# Patient Record
Sex: FEMALE | Race: WHITE | NOT HISPANIC OR LATINO | Employment: FULL TIME | RURAL
[De-identification: names, ages, dates, MRNs, and addresses within clinical notes are randomized per-mention and may not be internally consistent; named-entity substitution may affect disease eponyms.]

---

## 2020-06-15 ENCOUNTER — HISTORICAL (OUTPATIENT)
Dept: ADMINISTRATIVE | Facility: HOSPITAL | Age: 45
End: 2020-06-15

## 2020-06-15 LAB
BASOPHILS # BLD AUTO: 0.02 X10E3/UL (ref 0–0.2)
BASOPHILS NFR BLD AUTO: 0.4 % (ref 0–1)
BUN SERPL-MCNC: 12 MG/DL (ref 7–18)
CALCIUM SERPL-MCNC: 9.1 MG/DL (ref 8.5–10.1)
CHLORIDE SERPL-SCNC: 106 MMOL/L (ref 98–107)
CO2 SERPL-SCNC: 26 MMOL/L (ref 21–32)
CREAT SERPL-MCNC: 1.11 MG/DL (ref 0.55–1.02)
EOSINOPHIL # BLD AUTO: 0.12 X10E3/UL (ref 0–0.5)
EOSINOPHIL NFR BLD AUTO: 2.2 % (ref 1–4)
ERYTHROCYTE [DISTWIDTH] IN BLOOD BY AUTOMATED COUNT: 15.4 % (ref 11.5–14.5)
GLUCOSE SERPL-MCNC: 105 MG/DL (ref 74–106)
HCG SERUM QUALITATIVE: NEGATIVE
HCT VFR BLD AUTO: 37.9 % (ref 38–47)
HGB BLD-MCNC: 11.4 G/DL (ref 12–16)
IMM GRANULOCYTES # BLD AUTO: 0.02 X10E3/UL (ref 0–0.04)
IMM GRANULOCYTES NFR BLD: 0.4 % (ref 0–0.4)
LYMPHOCYTES # BLD AUTO: 1.51 X10E3/UL (ref 1–4.8)
LYMPHOCYTES NFR BLD AUTO: 27.1 % (ref 27–41)
MCH RBC QN AUTO: 25.2 PG (ref 27–31)
MCHC RBC AUTO-ENTMCNC: 30.1 G/DL (ref 32–36)
MCV RBC AUTO: 83.7 FL (ref 80–96)
MONOCYTES # BLD AUTO: 0.44 X10E3/UL (ref 0–0.8)
MONOCYTES NFR BLD AUTO: 7.9 % (ref 2–6)
MPC BLD CALC-MCNC: 9.8 FL (ref 9.4–12.4)
NEUTROPHILS # BLD AUTO: 3.47 X10E3/UL (ref 1.8–7.7)
NEUTROPHILS NFR BLD AUTO: 62 % (ref 53–65)
NRBC # BLD AUTO: 0 X10E3/UL (ref 0–0)
NRBC, AUTO (.00): 0 /100 (ref 0–0)
PLATELET # BLD AUTO: 314 X10E3/UL (ref 150–400)
POTASSIUM SERPL-SCNC: 3.6 MMOL/L (ref 3.5–5.1)
RBC # BLD AUTO: 4.53 X10E6/UL (ref 4.2–5.4)
SODIUM SERPL-SCNC: 139 MMOL/L (ref 136–145)
WBC # BLD AUTO: 5.58 X10E3/UL (ref 4.5–11)

## 2021-05-26 ENCOUNTER — HOSPITAL ENCOUNTER (EMERGENCY)
Facility: HOSPITAL | Age: 46
Discharge: HOME | End: 2021-05-26
Attending: STUDENT IN AN ORGANIZED HEALTH CARE EDUCATION/TRAINING PROGRAM
Payer: MEDICAID

## 2021-05-26 VITALS
RESPIRATION RATE: 16 BRPM | DIASTOLIC BLOOD PRESSURE: 100 MMHG | TEMPERATURE: 98 F | HEIGHT: 66 IN | OXYGEN SATURATION: 97 % | SYSTOLIC BLOOD PRESSURE: 149 MMHG | BODY MASS INDEX: 25.71 KG/M2 | HEART RATE: 80 BPM | WEIGHT: 160 LBS

## 2021-05-26 DIAGNOSIS — K52.9 GASTROENTERITIS: ICD-10-CM

## 2021-05-26 DIAGNOSIS — U07.1 COVID-19: Primary | ICD-10-CM

## 2021-05-26 LAB
ANION GAP SERPL CALCULATED.3IONS-SCNC: 8 MMOL/L (ref 7–16)
ANISOCYTOSIS BLD QL SMEAR: ABNORMAL
BASOPHILS # BLD AUTO: 0.01 K/UL (ref 0–0.2)
BASOPHILS NFR BLD AUTO: 0.4 % (ref 0–1)
BUN SERPL-MCNC: 9 MG/DL (ref 7–18)
BUN/CREAT SERPL: 13 (ref 6–20)
CALCIUM SERPL-MCNC: 8.6 MG/DL (ref 8.5–10.1)
CHLORIDE SERPL-SCNC: 108 MMOL/L (ref 98–107)
CO2 SERPL-SCNC: 27 MMOL/L (ref 21–32)
CREAT SERPL-MCNC: 0.7 MG/DL (ref 0.55–1.02)
DIFFERENTIAL METHOD BLD: ABNORMAL
EOSINOPHIL # BLD AUTO: 0 K/UL (ref 0–0.5)
EOSINOPHIL NFR BLD AUTO: 0 % (ref 1–4)
ERYTHROCYTE [DISTWIDTH] IN BLOOD BY AUTOMATED COUNT: 15 % (ref 11.5–14.5)
FLUAV AG UPPER RESP QL IA.RAPID: NEGATIVE
FLUBV AG UPPER RESP QL IA.RAPID: NEGATIVE
GLUCOSE SERPL-MCNC: 102 MG/DL (ref 74–106)
HCT VFR BLD AUTO: 37.3 % (ref 38–47)
HGB BLD-MCNC: 11.4 G/DL (ref 12–16)
IMM GRANULOCYTES # BLD AUTO: 0 K/UL (ref 0–0.04)
IMM GRANULOCYTES NFR BLD: 0 % (ref 0–0.4)
LYMPHOCYTES # BLD AUTO: 0.71 K/UL (ref 1–4.8)
LYMPHOCYTES NFR BLD AUTO: 30.3 % (ref 27–41)
LYMPHOCYTES NFR BLD MANUAL: 27 % (ref 27–41)
MCH RBC QN AUTO: 24.4 PG (ref 27–31)
MCHC RBC AUTO-ENTMCNC: 30.6 G/DL (ref 32–36)
MCV RBC AUTO: 79.7 FL (ref 80–96)
MONOCYTES # BLD AUTO: 0.16 K/UL (ref 0–0.8)
MONOCYTES NFR BLD AUTO: 6.8 % (ref 2–6)
MONOCYTES NFR BLD MANUAL: 3 % (ref 2–6)
MPC BLD CALC-MCNC: 10.3 FL (ref 9.4–12.4)
NEUTROPHILS # BLD AUTO: 1.46 K/UL (ref 1.8–7.7)
NEUTROPHILS NFR BLD AUTO: 62.5 % (ref 53–65)
NEUTS BAND NFR BLD MANUAL: 1 % (ref 1–5)
NEUTS SEG NFR BLD MANUAL: 69 % (ref 50–62)
NRBC # BLD AUTO: 0 X10E3/UL
NRBC, AUTO (.00): 0 %
OVALOCYTES BLD QL SMEAR: ABNORMAL
PLATELET # BLD AUTO: 201 K/UL (ref 150–400)
PLATELET MORPHOLOGY: ABNORMAL
POTASSIUM SERPL-SCNC: 3.4 MMOL/L (ref 3.5–5.1)
RBC # BLD AUTO: 4.68 M/UL (ref 4.2–5.4)
SARS-COV+SARS-COV-2 AG RESP QL IA.RAPID: POSITIVE
SODIUM SERPL-SCNC: 140 MMOL/L (ref 136–145)
WBC # BLD AUTO: 2.34 K/UL (ref 4.5–11)

## 2021-05-26 PROCEDURE — 25000003 PHARM REV CODE 250: Performed by: STUDENT IN AN ORGANIZED HEALTH CARE EDUCATION/TRAINING PROGRAM

## 2021-05-26 PROCEDURE — 80048 BASIC METABOLIC PNL TOTAL CA: CPT | Performed by: STUDENT IN AN ORGANIZED HEALTH CARE EDUCATION/TRAINING PROGRAM

## 2021-05-26 PROCEDURE — 99284 PR EMERGENCY DEPT VISIT,LEVEL IV: ICD-10-PCS | Mod: ,,, | Performed by: STUDENT IN AN ORGANIZED HEALTH CARE EDUCATION/TRAINING PROGRAM

## 2021-05-26 PROCEDURE — 87428 SARSCOV & INF VIR A&B AG IA: CPT | Performed by: STUDENT IN AN ORGANIZED HEALTH CARE EDUCATION/TRAINING PROGRAM

## 2021-05-26 PROCEDURE — 99284 EMERGENCY DEPT VISIT MOD MDM: CPT | Mod: ,,, | Performed by: STUDENT IN AN ORGANIZED HEALTH CARE EDUCATION/TRAINING PROGRAM

## 2021-05-26 PROCEDURE — 99284 EMERGENCY DEPT VISIT MOD MDM: CPT | Mod: 25

## 2021-05-26 PROCEDURE — 96365 THER/PROPH/DIAG IV INF INIT: CPT

## 2021-05-26 PROCEDURE — 36415 COLL VENOUS BLD VENIPUNCTURE: CPT | Performed by: STUDENT IN AN ORGANIZED HEALTH CARE EDUCATION/TRAINING PROGRAM

## 2021-05-26 PROCEDURE — 63600175 PHARM REV CODE 636 W HCPCS: Performed by: STUDENT IN AN ORGANIZED HEALTH CARE EDUCATION/TRAINING PROGRAM

## 2021-05-26 PROCEDURE — 85025 COMPLETE CBC W/AUTO DIFF WBC: CPT | Performed by: STUDENT IN AN ORGANIZED HEALTH CARE EDUCATION/TRAINING PROGRAM

## 2021-05-26 RX ORDER — METHYLPREDNISOLONE 4 MG/1
TABLET ORAL
Qty: 1 PACKAGE | Refills: 0 | Status: SHIPPED | OUTPATIENT
Start: 2021-05-26 | End: 2021-06-16

## 2021-05-26 RX ORDER — POTASSIUM CHLORIDE 20 MEQ/1
40 TABLET, EXTENDED RELEASE ORAL
Status: COMPLETED | OUTPATIENT
Start: 2021-05-26 | End: 2021-05-26

## 2021-05-26 RX ORDER — ONDANSETRON 4 MG/1
4 TABLET, ORALLY DISINTEGRATING ORAL
Status: COMPLETED | OUTPATIENT
Start: 2021-05-26 | End: 2021-05-26

## 2021-05-26 RX ORDER — LISINOPRIL 40 MG/1
40 TABLET ORAL DAILY
COMMUNITY
End: 2021-12-02

## 2021-05-26 RX ADMIN — SODIUM CHLORIDE 1000 ML: 9 INJECTION, SOLUTION INTRAVENOUS at 01:05

## 2021-05-26 RX ADMIN — POTASSIUM CHLORIDE 40 MEQ: 1500 TABLET, EXTENDED RELEASE ORAL at 03:05

## 2021-05-26 RX ADMIN — ONDANSETRON 4 MG: 4 TABLET, ORALLY DISINTEGRATING ORAL at 12:05

## 2021-05-26 RX ADMIN — PROMETHAZINE HYDROCHLORIDE 25 MG: 25 INJECTION INTRAMUSCULAR; INTRAVENOUS at 01:05

## 2021-09-21 ENCOUNTER — OFFICE VISIT (OUTPATIENT)
Dept: OBSTETRICS AND GYNECOLOGY | Facility: CLINIC | Age: 46
End: 2021-09-21
Payer: MEDICAID

## 2021-09-21 VITALS
HEIGHT: 66 IN | SYSTOLIC BLOOD PRESSURE: 152 MMHG | BODY MASS INDEX: 27.32 KG/M2 | TEMPERATURE: 99 F | HEART RATE: 68 BPM | WEIGHT: 170 LBS | RESPIRATION RATE: 18 BRPM | DIASTOLIC BLOOD PRESSURE: 98 MMHG | OXYGEN SATURATION: 99 %

## 2021-09-21 DIAGNOSIS — N95.1 PERIMENOPAUSAL: ICD-10-CM

## 2021-09-21 DIAGNOSIS — G47.9 SLEEP DISTURBANCE: ICD-10-CM

## 2021-09-21 DIAGNOSIS — N92.1 MENORRHAGIA WITH IRREGULAR CYCLE: Primary | ICD-10-CM

## 2021-09-21 DIAGNOSIS — L65.9 HAIR THINNING: ICD-10-CM

## 2021-09-21 DIAGNOSIS — F41.9 ANXIETY: ICD-10-CM

## 2021-09-21 LAB
BASOPHILS # BLD AUTO: 0.02 K/UL (ref 0–0.2)
BASOPHILS NFR BLD AUTO: 0.3 % (ref 0–1)
DIFFERENTIAL METHOD BLD: ABNORMAL
EOSINOPHIL # BLD AUTO: 0.09 K/UL (ref 0–0.5)
EOSINOPHIL NFR BLD AUTO: 1.2 % (ref 1–4)
ERYTHROCYTE [DISTWIDTH] IN BLOOD BY AUTOMATED COUNT: 14.9 % (ref 11.5–14.5)
FSH SERPL-ACNC: 2 MIU/ML (ref 1.7–134.8)
HCT VFR BLD AUTO: 34.5 % (ref 38–47)
HGB BLD-MCNC: 10.4 G/DL (ref 12–16)
IMM GRANULOCYTES # BLD AUTO: 0.02 K/UL (ref 0–0.04)
IMM GRANULOCYTES NFR BLD: 0.3 % (ref 0–0.4)
LYMPHOCYTES # BLD AUTO: 1.85 K/UL (ref 1–4.8)
LYMPHOCYTES NFR BLD AUTO: 23.7 % (ref 27–41)
MCH RBC QN AUTO: 24.5 PG (ref 27–31)
MCHC RBC AUTO-ENTMCNC: 30.1 G/DL (ref 32–36)
MCV RBC AUTO: 81.2 FL (ref 80–96)
MONOCYTES # BLD AUTO: 0.44 K/UL (ref 0–0.8)
MONOCYTES NFR BLD AUTO: 5.6 % (ref 2–6)
MPC BLD CALC-MCNC: 10.9 FL (ref 9.4–12.4)
NEUTROPHILS # BLD AUTO: 5.38 K/UL (ref 1.8–7.7)
NEUTROPHILS NFR BLD AUTO: 68.9 % (ref 53–65)
NRBC # BLD AUTO: 0 X10E3/UL
NRBC, AUTO (.00): 0 %
PLATELET # BLD AUTO: 312 K/UL (ref 150–400)
RBC # BLD AUTO: 4.25 M/UL (ref 4.2–5.4)
T4 FREE SERPL-MCNC: 0.91 NG/DL (ref 0.76–1.46)
TSH SERPL DL<=0.005 MIU/L-ACNC: 1.85 UIU/ML (ref 0.36–3.74)
WBC # BLD AUTO: 7.8 K/UL (ref 4.5–11)

## 2021-09-21 PROCEDURE — 83001 FOLLICLE STIMULATING HORMONE: ICD-10-PCS | Mod: ,,, | Performed by: CLINICAL MEDICAL LABORATORY

## 2021-09-21 PROCEDURE — 84443 TSH: ICD-10-PCS | Mod: ,,, | Performed by: CLINICAL MEDICAL LABORATORY

## 2021-09-21 PROCEDURE — 83001 ASSAY OF GONADOTROPIN (FSH): CPT | Mod: ,,, | Performed by: CLINICAL MEDICAL LABORATORY

## 2021-09-21 PROCEDURE — 84439 T4, FREE: ICD-10-PCS | Mod: ,,, | Performed by: CLINICAL MEDICAL LABORATORY

## 2021-09-21 PROCEDURE — 85025 CBC WITH DIFFERENTIAL: ICD-10-PCS | Mod: ,,, | Performed by: CLINICAL MEDICAL LABORATORY

## 2021-09-21 PROCEDURE — 84443 ASSAY THYROID STIM HORMONE: CPT | Mod: ,,, | Performed by: CLINICAL MEDICAL LABORATORY

## 2021-09-21 PROCEDURE — 85025 COMPLETE CBC W/AUTO DIFF WBC: CPT | Mod: ,,, | Performed by: CLINICAL MEDICAL LABORATORY

## 2021-09-21 PROCEDURE — 84439 ASSAY OF FREE THYROXINE: CPT | Mod: ,,, | Performed by: CLINICAL MEDICAL LABORATORY

## 2021-09-21 PROCEDURE — 99203 OFFICE O/P NEW LOW 30 MIN: CPT | Mod: ,,, | Performed by: ADVANCED PRACTICE MIDWIFE

## 2021-09-21 PROCEDURE — 99203 PR OFFICE/OUTPT VISIT, NEW, LEVL III, 30-44 MIN: ICD-10-PCS | Mod: ,,, | Performed by: ADVANCED PRACTICE MIDWIFE

## 2021-09-21 RX ORDER — MEDROXYPROGESTERONE ACETATE 10 MG/1
10 TABLET ORAL DAILY
Qty: 10 TABLET | Refills: 3 | Status: SHIPPED | OUTPATIENT
Start: 2021-09-21 | End: 2021-10-07

## 2021-09-22 RX ORDER — LANOLIN ALCOHOL/MO/W.PET/CERES
1 CREAM (GRAM) TOPICAL DAILY
Qty: 30 TABLET | Refills: 1 | Status: SHIPPED | OUTPATIENT
Start: 2021-09-22 | End: 2021-11-21

## 2021-09-23 ENCOUNTER — TELEPHONE (OUTPATIENT)
Dept: OBSTETRICS AND GYNECOLOGY | Facility: CLINIC | Age: 46
End: 2021-09-23

## 2021-09-27 ENCOUNTER — HOSPITAL ENCOUNTER (OUTPATIENT)
Dept: RADIOLOGY | Facility: HOSPITAL | Age: 46
Discharge: HOME | End: 2021-09-27
Attending: ADVANCED PRACTICE MIDWIFE
Payer: MEDICAID

## 2021-09-27 DIAGNOSIS — N92.1 MENORRHAGIA WITH IRREGULAR CYCLE: ICD-10-CM

## 2021-09-27 PROCEDURE — 76856 US EXAM PELVIC COMPLETE: CPT | Mod: TC

## 2021-09-27 PROCEDURE — 76856 US EXAM PELVIC COMPLETE: CPT | Mod: 26,,, | Performed by: RADIOLOGY

## 2021-09-27 PROCEDURE — 76856 US PELVIS COMPLETE NON OB: ICD-10-PCS | Mod: 26,,, | Performed by: RADIOLOGY

## 2021-10-07 ENCOUNTER — OFFICE VISIT (OUTPATIENT)
Dept: OBSTETRICS AND GYNECOLOGY | Facility: CLINIC | Age: 46
End: 2021-10-07
Payer: COMMERCIAL

## 2021-10-07 VITALS
WEIGHT: 167.19 LBS | RESPIRATION RATE: 17 BRPM | HEART RATE: 60 BPM | DIASTOLIC BLOOD PRESSURE: 79 MMHG | HEIGHT: 66 IN | TEMPERATURE: 98 F | SYSTOLIC BLOOD PRESSURE: 130 MMHG | BODY MASS INDEX: 26.87 KG/M2 | OXYGEN SATURATION: 98 %

## 2021-10-07 DIAGNOSIS — D50.0 IRON DEFICIENCY ANEMIA DUE TO CHRONIC BLOOD LOSS: ICD-10-CM

## 2021-10-07 DIAGNOSIS — N92.1 MENORRHAGIA WITH IRREGULAR CYCLE: Primary | ICD-10-CM

## 2021-10-07 PROCEDURE — 99212 OFFICE O/P EST SF 10 MIN: CPT | Mod: ,,, | Performed by: ADVANCED PRACTICE MIDWIFE

## 2021-10-07 PROCEDURE — 99212 PR OFFICE/OUTPT VISIT, EST, LEVL II, 10-19 MIN: ICD-10-PCS | Mod: ,,, | Performed by: ADVANCED PRACTICE MIDWIFE

## 2021-10-07 RX ORDER — MEDROXYPROGESTERONE ACETATE 10 MG/1
10 TABLET ORAL 2 TIMES DAILY
Qty: 20 TABLET | Refills: 2 | Status: SHIPPED | OUTPATIENT
Start: 2021-10-07 | End: 2021-12-02

## 2021-10-26 ENCOUNTER — OFFICE VISIT (OUTPATIENT)
Dept: OBSTETRICS AND GYNECOLOGY | Facility: CLINIC | Age: 46
End: 2021-10-26
Payer: COMMERCIAL

## 2021-10-26 ENCOUNTER — CLINICAL SUPPORT (OUTPATIENT)
Dept: CARDIOLOGY | Facility: CLINIC | Age: 46
End: 2021-10-26
Payer: COMMERCIAL

## 2021-10-26 VITALS
SYSTOLIC BLOOD PRESSURE: 146 MMHG | DIASTOLIC BLOOD PRESSURE: 103 MMHG | HEIGHT: 66 IN | WEIGHT: 168 LBS | BODY MASS INDEX: 27 KG/M2

## 2021-10-26 DIAGNOSIS — N83.209 CYST OF OVARY, UNSPECIFIED LATERALITY: ICD-10-CM

## 2021-10-26 DIAGNOSIS — N94.10 DYSPAREUNIA IN FEMALE: Primary | ICD-10-CM

## 2021-10-26 DIAGNOSIS — N94.10 DYSPAREUNIA IN FEMALE: ICD-10-CM

## 2021-10-26 DIAGNOSIS — N94.6 DYSMENORRHEA: ICD-10-CM

## 2021-10-26 DIAGNOSIS — N92.1 MENORRHAGIA WITH IRREGULAR CYCLE: ICD-10-CM

## 2021-10-26 DIAGNOSIS — R10.2 PELVIC PAIN: ICD-10-CM

## 2021-10-26 DIAGNOSIS — N92.1 MENOMETRORRHAGIA: ICD-10-CM

## 2021-10-26 DIAGNOSIS — N94.6 DYSMENORRHEA: Primary | ICD-10-CM

## 2021-10-26 DIAGNOSIS — I10 HYPERTENSION, UNSPECIFIED TYPE: ICD-10-CM

## 2021-10-26 DIAGNOSIS — Z12.4 SCREENING FOR MALIGNANT NEOPLASM OF THE CERVIX: ICD-10-CM

## 2021-10-26 DIAGNOSIS — N92.1 MENORRHAGIA WITH IRREGULAR CYCLE: Primary | ICD-10-CM

## 2021-10-26 DIAGNOSIS — N92.1 METRORRHAGIA: ICD-10-CM

## 2021-10-26 PROCEDURE — 88142 CYTOPATH C/V THIN LAYER: CPT | Mod: GCY | Performed by: OBSTETRICS & GYNECOLOGY

## 2021-10-26 PROCEDURE — 93010 EKG 12-LEAD: ICD-10-PCS | Mod: S$PBB,,, | Performed by: INTERNAL MEDICINE

## 2021-10-26 PROCEDURE — 99214 OFFICE O/P EST MOD 30 MIN: CPT | Mod: S$PBB,,, | Performed by: OBSTETRICS & GYNECOLOGY

## 2021-10-26 PROCEDURE — 99214 PR OFFICE/OUTPT VISIT, EST, LEVL IV, 30-39 MIN: ICD-10-PCS | Mod: S$PBB,,, | Performed by: OBSTETRICS & GYNECOLOGY

## 2021-10-26 PROCEDURE — 93010 ELECTROCARDIOGRAM REPORT: CPT | Mod: S$PBB,,, | Performed by: INTERNAL MEDICINE

## 2021-10-26 PROCEDURE — 99213 OFFICE O/P EST LOW 20 MIN: CPT | Mod: PBBFAC | Performed by: OBSTETRICS & GYNECOLOGY

## 2021-10-26 PROCEDURE — 93005 ELECTROCARDIOGRAM TRACING: CPT | Mod: PBBFAC | Performed by: INTERNAL MEDICINE

## 2021-10-26 PROCEDURE — 99212 OFFICE O/P EST SF 10 MIN: CPT | Mod: PBBFAC

## 2021-10-26 RX ORDER — PROMETHAZINE HYDROCHLORIDE 25 MG/1
25 SUPPOSITORY RECTAL EVERY 6 HOURS PRN
Status: CANCELLED | OUTPATIENT
Start: 2021-10-26

## 2021-10-26 RX ORDER — ONDANSETRON 4 MG/1
8 TABLET, ORALLY DISINTEGRATING ORAL EVERY 8 HOURS PRN
Status: CANCELLED | OUTPATIENT
Start: 2021-10-26

## 2021-10-26 RX ORDER — SODIUM CHLORIDE 9 MG/ML
INJECTION, SOLUTION INTRAVENOUS CONTINUOUS
Status: CANCELLED | OUTPATIENT
Start: 2021-10-26

## 2021-10-27 DIAGNOSIS — Z01.812 PRE-OPERATIVE LABORATORY EXAMINATION: Primary | ICD-10-CM

## 2021-10-27 DIAGNOSIS — N92.1 MENOMETRORRHAGIA: ICD-10-CM

## 2021-10-29 LAB
GH SERPL-MCNC: NORMAL NG/ML
INSULIN SERPL-ACNC: NORMAL U[IU]/ML
LAB AP CLINICAL INFORMATION: NORMAL
LAB AP GYN INTERPRETATION: NEGATIVE
LAB AP PAP DISCLAIMER COMMENTS: NORMAL
RENIN PLAS-CCNC: NORMAL NG/ML/H

## 2021-11-10 ENCOUNTER — PROCEDURE VISIT (OUTPATIENT)
Dept: OBSTETRICS AND GYNECOLOGY | Facility: CLINIC | Age: 46
End: 2021-11-10
Payer: COMMERCIAL

## 2021-11-10 VITALS
WEIGHT: 169 LBS | BODY MASS INDEX: 27.16 KG/M2 | DIASTOLIC BLOOD PRESSURE: 97 MMHG | HEIGHT: 66 IN | SYSTOLIC BLOOD PRESSURE: 139 MMHG

## 2021-11-10 DIAGNOSIS — N92.1 METRORRHAGIA: Primary | ICD-10-CM

## 2021-11-10 PROCEDURE — 88305 TISSUE EXAM BY PATHOLOGIST: CPT | Mod: SUR | Performed by: OBSTETRICS & GYNECOLOGY

## 2021-11-10 PROCEDURE — 58100 BIOPSY OF UTERUS LINING: CPT | Mod: PBBFAC | Performed by: OBSTETRICS & GYNECOLOGY

## 2021-11-10 PROCEDURE — 88305 TISSUE EXAM BY PATHOLOGIST: CPT | Mod: 26,,, | Performed by: PATHOLOGY

## 2021-11-10 PROCEDURE — 58100 PR BIOPSY OF UTERUS LINING: ICD-10-PCS | Mod: S$PBB,,, | Performed by: OBSTETRICS & GYNECOLOGY

## 2021-11-10 PROCEDURE — 88305 SURGICAL PATHOLOGY: ICD-10-PCS | Mod: 26,,, | Performed by: PATHOLOGY

## 2021-11-10 PROCEDURE — 58100 BIOPSY OF UTERUS LINING: CPT | Mod: S$PBB,,, | Performed by: OBSTETRICS & GYNECOLOGY

## 2021-11-10 RX ORDER — NIFEDIPINE 30 MG/1
TABLET, FILM COATED, EXTENDED RELEASE ORAL
COMMUNITY
Start: 2021-11-08 | End: 2021-12-02

## 2021-11-12 LAB
ESTROGEN SERPL-MCNC: NORMAL PG/ML
LAB AP CLINICAL INFORMATION: NORMAL
LAB AP GROSS DESCRIPTION: NORMAL
LAB AP LABORATORY NOTES: NORMAL
T3RU NFR SERPL: NORMAL %

## 2021-12-02 ENCOUNTER — OFFICE VISIT (OUTPATIENT)
Dept: OBSTETRICS AND GYNECOLOGY | Facility: CLINIC | Age: 46
End: 2021-12-02
Payer: MEDICAID

## 2021-12-02 VITALS
DIASTOLIC BLOOD PRESSURE: 91 MMHG | TEMPERATURE: 97 F | WEIGHT: 166 LBS | SYSTOLIC BLOOD PRESSURE: 148 MMHG | HEIGHT: 66 IN | BODY MASS INDEX: 26.68 KG/M2 | HEART RATE: 56 BPM

## 2021-12-02 DIAGNOSIS — D50.0 IRON DEFICIENCY ANEMIA DUE TO CHRONIC BLOOD LOSS: ICD-10-CM

## 2021-12-02 DIAGNOSIS — I10 CHRONIC HYPERTENSION: ICD-10-CM

## 2021-12-02 DIAGNOSIS — R10.32 LEFT LOWER QUADRANT PAIN: ICD-10-CM

## 2021-12-02 DIAGNOSIS — N92.1 MENORRHAGIA WITH IRREGULAR CYCLE: Primary | ICD-10-CM

## 2021-12-02 DIAGNOSIS — R10.2 CHRONIC PELVIC PAIN IN FEMALE: ICD-10-CM

## 2021-12-02 DIAGNOSIS — N94.6 DYSMENORRHEA: ICD-10-CM

## 2021-12-02 DIAGNOSIS — G89.29 CHRONIC PELVIC PAIN IN FEMALE: ICD-10-CM

## 2021-12-02 DIAGNOSIS — N80.03 ADENOMYOSIS: ICD-10-CM

## 2021-12-02 LAB
ALBUMIN SERPL BCP-MCNC: 3.8 G/DL (ref 3.5–5)
ALBUMIN/GLOB SERPL: 1.2 {RATIO}
ALP SERPL-CCNC: 58 U/L (ref 39–100)
ALT SERPL W P-5'-P-CCNC: 32 U/L (ref 13–56)
ANION GAP SERPL CALCULATED.3IONS-SCNC: 7 MMOL/L (ref 7–16)
AST SERPL W P-5'-P-CCNC: 24 U/L (ref 15–37)
BASOPHILS # BLD AUTO: 0.01 K/UL (ref 0–0.2)
BASOPHILS NFR BLD AUTO: 0.2 % (ref 0–1)
BILIRUB SERPL-MCNC: 0.4 MG/DL (ref 0–1.2)
BILIRUB UR QL STRIP: NEGATIVE
BUN SERPL-MCNC: 11 MG/DL (ref 7–18)
BUN/CREAT SERPL: 15 (ref 6–20)
CALCIUM SERPL-MCNC: 8.8 MG/DL (ref 8.5–10.1)
CHLORIDE SERPL-SCNC: 109 MMOL/L (ref 98–107)
CLARITY UR: CLEAR
CO2 SERPL-SCNC: 28 MMOL/L (ref 21–32)
COLOR UR: YELLOW
CREAT SERPL-MCNC: 0.74 MG/DL (ref 0.55–1.02)
DIFFERENTIAL METHOD BLD: ABNORMAL
EOSINOPHIL # BLD AUTO: 0.05 K/UL (ref 0–0.5)
EOSINOPHIL NFR BLD AUTO: 1 % (ref 1–4)
ERYTHROCYTE [DISTWIDTH] IN BLOOD BY AUTOMATED COUNT: 17.2 % (ref 11.5–14.5)
GLOBULIN SER-MCNC: 3.3 G/DL (ref 2–4)
GLUCOSE SERPL-MCNC: 80 MG/DL (ref 74–106)
GLUCOSE UR STRIP-MCNC: NEGATIVE MG/DL
HCG SERPL-ACNC: <1 MIU/ML
HCT VFR BLD AUTO: 36.9 % (ref 38–47)
HGB BLD-MCNC: 11 G/DL (ref 12–16)
IMM GRANULOCYTES # BLD AUTO: 0.01 K/UL (ref 0–0.04)
IMM GRANULOCYTES NFR BLD: 0.2 % (ref 0–0.4)
INDIRECT COOMBS: NORMAL
KETONES UR STRIP-SCNC: NEGATIVE MG/DL
LEUKOCYTE ESTERASE UR QL STRIP: NEGATIVE
LYMPHOCYTES # BLD AUTO: 1.86 K/UL (ref 1–4.8)
LYMPHOCYTES NFR BLD AUTO: 37.1 % (ref 27–41)
MCH RBC QN AUTO: 24 PG (ref 27–31)
MCHC RBC AUTO-ENTMCNC: 29.8 G/DL (ref 32–36)
MCV RBC AUTO: 80.6 FL (ref 80–96)
MONOCYTES # BLD AUTO: 0.25 K/UL (ref 0–0.8)
MONOCYTES NFR BLD AUTO: 5 % (ref 2–6)
MPC BLD CALC-MCNC: 10.4 FL (ref 9.4–12.4)
NEUTROPHILS # BLD AUTO: 2.83 K/UL (ref 1.8–7.7)
NEUTROPHILS NFR BLD AUTO: 56.5 % (ref 53–65)
NITRITE UR QL STRIP: NEGATIVE
NRBC # BLD AUTO: 0 X10E3/UL
NRBC, AUTO (.00): 0 %
PH UR STRIP: 6 PH UNITS
PLATELET # BLD AUTO: 291 K/UL (ref 150–400)
POTASSIUM SERPL-SCNC: 4.1 MMOL/L (ref 3.5–5.1)
PROT SERPL-MCNC: 7.1 G/DL (ref 6.4–8.2)
PROT UR QL STRIP: NEGATIVE
RBC # BLD AUTO: 4.58 M/UL (ref 4.2–5.4)
RBC # UR STRIP: NEGATIVE /UL
RH BLD: NORMAL
SODIUM SERPL-SCNC: 140 MMOL/L (ref 136–145)
SP GR UR STRIP: >=1.03
UROBILINOGEN UR STRIP-ACNC: 0.2 MG/DL
WBC # BLD AUTO: 5.01 K/UL (ref 4.5–11)

## 2021-12-02 PROCEDURE — 80053 COMPREHENSIVE METABOLIC PANEL: ICD-10-PCS | Mod: ,,, | Performed by: CLINICAL MEDICAL LABORATORY

## 2021-12-02 PROCEDURE — 36415 COLL VENOUS BLD VENIPUNCTURE: CPT | Mod: ,,, | Performed by: OBSTETRICS & GYNECOLOGY

## 2021-12-02 PROCEDURE — 85025 CBC WITH DIFFERENTIAL: ICD-10-PCS | Mod: ,,, | Performed by: CLINICAL MEDICAL LABORATORY

## 2021-12-02 PROCEDURE — 86901 BLOOD TYPING SEROLOGIC RH(D): CPT | Mod: ,,, | Performed by: CLINICAL MEDICAL LABORATORY

## 2021-12-02 PROCEDURE — 81003 URINALYSIS: ICD-10-PCS | Mod: QW,,, | Performed by: CLINICAL MEDICAL LABORATORY

## 2021-12-02 PROCEDURE — 81003 URINALYSIS AUTO W/O SCOPE: CPT | Mod: QW,,, | Performed by: CLINICAL MEDICAL LABORATORY

## 2021-12-02 PROCEDURE — 86850 TYPE & SCREEN: ICD-10-PCS | Mod: ,,, | Performed by: CLINICAL MEDICAL LABORATORY

## 2021-12-02 PROCEDURE — 80053 COMPREHEN METABOLIC PANEL: CPT | Mod: ,,, | Performed by: CLINICAL MEDICAL LABORATORY

## 2021-12-02 PROCEDURE — 84702 HCG, TOTAL, QUANTITATIVE: ICD-10-PCS | Mod: ,,, | Performed by: CLINICAL MEDICAL LABORATORY

## 2021-12-02 PROCEDURE — 85025 COMPLETE CBC W/AUTO DIFF WBC: CPT | Mod: ,,, | Performed by: CLINICAL MEDICAL LABORATORY

## 2021-12-02 PROCEDURE — 84702 CHORIONIC GONADOTROPIN TEST: CPT | Mod: ,,, | Performed by: CLINICAL MEDICAL LABORATORY

## 2021-12-02 PROCEDURE — 99205 OFFICE O/P NEW HI 60 MIN: CPT | Mod: ,,, | Performed by: OBSTETRICS & GYNECOLOGY

## 2021-12-02 PROCEDURE — 99205 PR OFFICE/OUTPT VISIT, NEW, LEVL V, 60-74 MIN: ICD-10-PCS | Mod: ,,, | Performed by: OBSTETRICS & GYNECOLOGY

## 2021-12-02 PROCEDURE — 36415 PR COLLECTION VENOUS BLOOD,VENIPUNCTURE: ICD-10-PCS | Mod: ,,, | Performed by: OBSTETRICS & GYNECOLOGY

## 2021-12-02 PROCEDURE — 86900 BLOOD TYPING SEROLOGIC ABO: CPT | Mod: ,,, | Performed by: CLINICAL MEDICAL LABORATORY

## 2021-12-02 PROCEDURE — 86901 TYPE & SCREEN: ICD-10-PCS | Mod: ,,, | Performed by: CLINICAL MEDICAL LABORATORY

## 2021-12-02 PROCEDURE — 86850 RBC ANTIBODY SCREEN: CPT | Mod: ,,, | Performed by: CLINICAL MEDICAL LABORATORY

## 2021-12-02 PROCEDURE — 86900 TYPE & SCREEN: ICD-10-PCS | Mod: ,,, | Performed by: CLINICAL MEDICAL LABORATORY

## 2021-12-02 RX ORDER — ONDANSETRON 2 MG/ML
4 INJECTION INTRAMUSCULAR; INTRAVENOUS EVERY 12 HOURS PRN
Status: CANCELLED | OUTPATIENT
Start: 2021-12-02

## 2021-12-02 RX ORDER — LISINOPRIL 20 MG/1
20 TABLET ORAL 2 TIMES DAILY
COMMUNITY
Start: 2021-06-16 | End: 2023-11-13 | Stop reason: SDUPTHER

## 2021-12-02 RX ORDER — LIDOCAINE HYDROCHLORIDE 10 MG/ML
1 INJECTION, SOLUTION EPIDURAL; INFILTRATION; INTRACAUDAL; PERINEURAL ONCE
Status: CANCELLED | OUTPATIENT
Start: 2021-12-02 | End: 2021-12-02

## 2021-12-02 RX ORDER — PHENAZOPYRIDINE HYDROCHLORIDE 200 MG/1
TABLET, FILM COATED ORAL
Qty: 4 TABLET | Refills: 0 | Status: ON HOLD | OUTPATIENT
Start: 2021-12-02 | End: 2021-12-08

## 2021-12-02 RX ORDER — SODIUM CHLORIDE, SODIUM LACTATE, POTASSIUM CHLORIDE, CALCIUM CHLORIDE 600; 310; 30; 20 MG/100ML; MG/100ML; MG/100ML; MG/100ML
INJECTION, SOLUTION INTRAVENOUS CONTINUOUS
Status: CANCELLED | OUTPATIENT
Start: 2021-12-02 | End: 2021-12-02

## 2021-12-02 RX ORDER — SYRING-NEEDL,DISP,INSUL,0.3 ML 29 G X1/2"
296 SYRINGE, EMPTY DISPOSABLE MISCELLANEOUS ONCE
Qty: 296 ML | Refills: 0 | Status: SHIPPED | OUTPATIENT
Start: 2021-12-02 | End: 2021-12-02

## 2021-12-02 RX ORDER — MUPIROCIN 20 MG/G
OINTMENT TOPICAL
Status: CANCELLED | OUTPATIENT
Start: 2021-12-02

## 2021-12-06 ENCOUNTER — HOSPITAL ENCOUNTER (OUTPATIENT)
Dept: CARDIOLOGY | Facility: HOSPITAL | Age: 46
Discharge: HOME | End: 2021-12-06
Attending: OBSTETRICS & GYNECOLOGY
Payer: COMMERCIAL

## 2021-12-06 ENCOUNTER — HOSPITAL ENCOUNTER (OUTPATIENT)
Dept: RADIOLOGY | Facility: HOSPITAL | Age: 46
Discharge: HOME | End: 2021-12-06
Attending: OBSTETRICS & GYNECOLOGY
Payer: MEDICAID

## 2021-12-06 DIAGNOSIS — R10.32 LEFT LOWER QUADRANT PAIN: ICD-10-CM

## 2021-12-06 DIAGNOSIS — G89.29 CHRONIC PELVIC PAIN IN FEMALE: ICD-10-CM

## 2021-12-06 DIAGNOSIS — D50.0 IRON DEFICIENCY ANEMIA DUE TO CHRONIC BLOOD LOSS: ICD-10-CM

## 2021-12-06 DIAGNOSIS — I10 CHRONIC HYPERTENSION: ICD-10-CM

## 2021-12-06 DIAGNOSIS — N92.1 MENORRHAGIA WITH IRREGULAR CYCLE: ICD-10-CM

## 2021-12-06 DIAGNOSIS — R10.2 CHRONIC PELVIC PAIN IN FEMALE: ICD-10-CM

## 2021-12-06 DIAGNOSIS — N80.03 ADENOMYOSIS: ICD-10-CM

## 2021-12-06 DIAGNOSIS — N94.6 DYSMENORRHEA: ICD-10-CM

## 2021-12-06 LAB — SARS-COV+SARS-COV-2 AG RESP QL IA.RAPID: NEGATIVE

## 2021-12-06 PROCEDURE — 71046 X-RAY EXAM CHEST 2 VIEWS: CPT | Mod: TC

## 2021-12-06 PROCEDURE — 93010 EKG 12-LEAD: ICD-10-PCS | Mod: ,,, | Performed by: HOSPITALIST

## 2021-12-06 PROCEDURE — 93010 ELECTROCARDIOGRAM REPORT: CPT | Mod: ,,, | Performed by: HOSPITALIST

## 2021-12-06 PROCEDURE — 93005 ELECTROCARDIOGRAM TRACING: CPT

## 2021-12-06 PROCEDURE — 71046 XR CHEST PA AND LATERAL: ICD-10-PCS | Mod: 26,,, | Performed by: RADIOLOGY

## 2021-12-06 PROCEDURE — 71046 X-RAY EXAM CHEST 2 VIEWS: CPT | Mod: 26,,, | Performed by: RADIOLOGY

## 2021-12-08 ENCOUNTER — ANESTHESIA EVENT (OUTPATIENT)
Dept: SURGERY | Facility: HOSPITAL | Age: 46
End: 2021-12-08
Payer: COMMERCIAL

## 2021-12-08 ENCOUNTER — HOSPITAL ENCOUNTER (OUTPATIENT)
Facility: HOSPITAL | Age: 46
Discharge: HOME | End: 2021-12-08
Attending: OBSTETRICS & GYNECOLOGY | Admitting: OBSTETRICS & GYNECOLOGY
Payer: COMMERCIAL

## 2021-12-08 ENCOUNTER — ANESTHESIA (OUTPATIENT)
Dept: SURGERY | Facility: HOSPITAL | Age: 46
End: 2021-12-08
Payer: MEDICAID

## 2021-12-08 VITALS
SYSTOLIC BLOOD PRESSURE: 150 MMHG | HEART RATE: 68 BPM | HEIGHT: 66 IN | OXYGEN SATURATION: 94 % | WEIGHT: 159 LBS | TEMPERATURE: 98 F | RESPIRATION RATE: 16 BRPM | BODY MASS INDEX: 25.55 KG/M2 | DIASTOLIC BLOOD PRESSURE: 95 MMHG

## 2021-12-08 DIAGNOSIS — R10.2 CHRONIC PELVIC PAIN IN FEMALE: ICD-10-CM

## 2021-12-08 DIAGNOSIS — I10 CHRONIC HYPERTENSION: ICD-10-CM

## 2021-12-08 DIAGNOSIS — N94.6 DYSMENORRHEA: ICD-10-CM

## 2021-12-08 DIAGNOSIS — D50.0 IRON DEFICIENCY ANEMIA DUE TO CHRONIC BLOOD LOSS: ICD-10-CM

## 2021-12-08 DIAGNOSIS — N92.1 MENORRHAGIA WITH IRREGULAR CYCLE: ICD-10-CM

## 2021-12-08 DIAGNOSIS — N80.03 ADENOMYOSIS: ICD-10-CM

## 2021-12-08 DIAGNOSIS — N94.10 DYSPAREUNIA IN FEMALE: ICD-10-CM

## 2021-12-08 DIAGNOSIS — G89.29 CHRONIC PELVIC PAIN IN FEMALE: ICD-10-CM

## 2021-12-08 DIAGNOSIS — R10.32 LEFT LOWER QUADRANT PAIN: ICD-10-CM

## 2021-12-08 LAB
HCT VFR BLD AUTO: 35.6 % (ref 38–47)
HGB BLD-MCNC: 11.2 G/DL (ref 12–16)
INDIRECT COOMBS: NORMAL
RH BLD: NORMAL

## 2021-12-08 PROCEDURE — 27000284 HC CANNULA NASAL: Performed by: ANESTHESIOLOGY

## 2021-12-08 PROCEDURE — 85018 HEMOGLOBIN: CPT | Performed by: OBSTETRICS & GYNECOLOGY

## 2021-12-08 PROCEDURE — 27000165 HC TUBE, ETT CUFFED: Performed by: ANESTHESIOLOGY

## 2021-12-08 PROCEDURE — 36415 COLL VENOUS BLD VENIPUNCTURE: CPT | Performed by: OBSTETRICS & GYNECOLOGY

## 2021-12-08 PROCEDURE — D9220A PRA ANESTHESIA: ICD-10-PCS | Mod: ANES,,, | Performed by: ANESTHESIOLOGY

## 2021-12-08 PROCEDURE — 27000716 HC OXISENSOR PROBE, ANY SIZE: Performed by: ANESTHESIOLOGY

## 2021-12-08 PROCEDURE — 64488 TAP BLOCK BI INJECTION: CPT | Mod: XU,,, | Performed by: ANESTHESIOLOGY

## 2021-12-08 PROCEDURE — 88307 TISSUE EXAM BY PATHOLOGIST: CPT | Mod: 26,,, | Performed by: PATHOLOGY

## 2021-12-08 PROCEDURE — 37000008 HC ANESTHESIA 1ST 15 MINUTES: Performed by: OBSTETRICS & GYNECOLOGY

## 2021-12-08 PROCEDURE — 86900 BLOOD TYPING SEROLOGIC ABO: CPT | Performed by: OBSTETRICS & GYNECOLOGY

## 2021-12-08 PROCEDURE — 25000003 PHARM REV CODE 250: Performed by: ANESTHESIOLOGY

## 2021-12-08 PROCEDURE — 36000712 HC OR TIME LEV V 1ST 15 MIN: Performed by: OBSTETRICS & GYNECOLOGY

## 2021-12-08 PROCEDURE — 71000015 HC POSTOP RECOV 1ST HR: Performed by: OBSTETRICS & GYNECOLOGY

## 2021-12-08 PROCEDURE — 37000009 HC ANESTHESIA EA ADD 15 MINS: Performed by: OBSTETRICS & GYNECOLOGY

## 2021-12-08 PROCEDURE — 71000016 HC POSTOP RECOV ADDL HR: Performed by: OBSTETRICS & GYNECOLOGY

## 2021-12-08 PROCEDURE — 88304 TISSUE EXAM BY PATHOLOGIST: CPT | Mod: SUR | Performed by: OBSTETRICS & GYNECOLOGY

## 2021-12-08 PROCEDURE — 63600175 PHARM REV CODE 636 W HCPCS: Performed by: ANESTHESIOLOGY

## 2021-12-08 PROCEDURE — D9220A PRA ANESTHESIA: Mod: ANES,,, | Performed by: ANESTHESIOLOGY

## 2021-12-08 PROCEDURE — 36000713 HC OR TIME LEV V EA ADD 15 MIN: Performed by: OBSTETRICS & GYNECOLOGY

## 2021-12-08 PROCEDURE — D9220A PRA ANESTHESIA: ICD-10-PCS | Mod: CRNA,,, | Performed by: NURSE ANESTHETIST, CERTIFIED REGISTERED

## 2021-12-08 PROCEDURE — 88307 TISSUE EXAM BY PATHOLOGIST: CPT | Mod: SUR | Performed by: OBSTETRICS & GYNECOLOGY

## 2021-12-08 PROCEDURE — 27201423 OPTIME MED/SURG SUP & DEVICES STERILE SUPPLY: Performed by: OBSTETRICS & GYNECOLOGY

## 2021-12-08 PROCEDURE — D9220A PRA ANESTHESIA: Mod: CRNA,,, | Performed by: NURSE ANESTHETIST, CERTIFIED REGISTERED

## 2021-12-08 PROCEDURE — 63600175 PHARM REV CODE 636 W HCPCS: Performed by: NURSE ANESTHETIST, CERTIFIED REGISTERED

## 2021-12-08 PROCEDURE — 64488 PERIPHERAL BLOCK: ICD-10-PCS | Mod: XU,,, | Performed by: ANESTHESIOLOGY

## 2021-12-08 PROCEDURE — 85014 HEMATOCRIT: CPT | Performed by: OBSTETRICS & GYNECOLOGY

## 2021-12-08 PROCEDURE — 63600175 PHARM REV CODE 636 W HCPCS: Mod: JW | Performed by: OBSTETRICS & GYNECOLOGY

## 2021-12-08 PROCEDURE — 27000260 *HC AIRWAY ORAL: Performed by: ANESTHESIOLOGY

## 2021-12-08 PROCEDURE — 63600175 PHARM REV CODE 636 W HCPCS: Performed by: OBSTETRICS & GYNECOLOGY

## 2021-12-08 PROCEDURE — 27000510 HC BLANKET BAIR HUGGER ANY SIZE: Performed by: ANESTHESIOLOGY

## 2021-12-08 PROCEDURE — 27202344 HC EYESHIELD: Performed by: ANESTHESIOLOGY

## 2021-12-08 PROCEDURE — 25000003 PHARM REV CODE 250: Performed by: NURSE ANESTHETIST, CERTIFIED REGISTERED

## 2021-12-08 PROCEDURE — 58571 PR LAPAROSCOPY W TOT HYSTERECTUTERUS <=250 GRAM  W TUBE/OVARY: ICD-10-PCS | Mod: 22,,, | Performed by: OBSTETRICS & GYNECOLOGY

## 2021-12-08 PROCEDURE — 88304 TISSUE EXAM BY PATHOLOGIST: CPT | Mod: 26,,, | Performed by: PATHOLOGY

## 2021-12-08 PROCEDURE — 27000655: Performed by: ANESTHESIOLOGY

## 2021-12-08 PROCEDURE — 88307 SURGICAL PATHOLOGY: ICD-10-PCS | Mod: 26,,, | Performed by: PATHOLOGY

## 2021-12-08 PROCEDURE — 58571 TLH W/T/O 250 G OR LESS: CPT | Mod: 22,,, | Performed by: OBSTETRICS & GYNECOLOGY

## 2021-12-08 PROCEDURE — 27000509 HC TUBE SALEM SUMP ANY SIZE: Performed by: ANESTHESIOLOGY

## 2021-12-08 PROCEDURE — 27000689 HC BLADE LARYNGOSCOPE ANY SIZE: Performed by: ANESTHESIOLOGY

## 2021-12-08 PROCEDURE — 71000033 HC RECOVERY, INTIAL HOUR: Performed by: OBSTETRICS & GYNECOLOGY

## 2021-12-08 PROCEDURE — 88304 SURGICAL PATHOLOGY: ICD-10-PCS | Mod: 26,,, | Performed by: PATHOLOGY

## 2021-12-08 PROCEDURE — 25000003 PHARM REV CODE 250: Performed by: OBSTETRICS & GYNECOLOGY

## 2021-12-08 RX ORDER — SODIUM CHLORIDE 9 MG/ML
INJECTION, SOLUTION INTRAVENOUS CONTINUOUS
Status: DISCONTINUED | OUTPATIENT
Start: 2021-12-08 | End: 2021-12-08 | Stop reason: HOSPADM

## 2021-12-08 RX ORDER — SCOLOPAMINE TRANSDERMAL SYSTEM 1 MG/1
1 PATCH, EXTENDED RELEASE TRANSDERMAL ONCE
Status: DISCONTINUED | OUTPATIENT
Start: 2021-12-08 | End: 2021-12-08 | Stop reason: HOSPADM

## 2021-12-08 RX ORDER — PROPOFOL 10 MG/ML
VIAL (ML) INTRAVENOUS
Status: DISCONTINUED | OUTPATIENT
Start: 2021-12-08 | End: 2021-12-08

## 2021-12-08 RX ORDER — ACETAMINOPHEN 500 MG
1000 TABLET ORAL EVERY 6 HOURS
Status: DISCONTINUED | OUTPATIENT
Start: 2021-12-08 | End: 2021-12-08 | Stop reason: HOSPADM

## 2021-12-08 RX ORDER — DIPHENHYDRAMINE HYDROCHLORIDE 50 MG/ML
25 INJECTION INTRAMUSCULAR; INTRAVENOUS EVERY 6 HOURS PRN
Status: DISCONTINUED | OUTPATIENT
Start: 2021-12-08 | End: 2021-12-08 | Stop reason: HOSPADM

## 2021-12-08 RX ORDER — SODIUM CHLORIDE 9 MG/ML
INJECTION, SOLUTION INTRAVENOUS CONTINUOUS
Status: CANCELLED | OUTPATIENT
Start: 2021-12-08

## 2021-12-08 RX ORDER — HYDROMORPHONE HYDROCHLORIDE 2 MG/ML
0.5 INJECTION, SOLUTION INTRAMUSCULAR; INTRAVENOUS; SUBCUTANEOUS EVERY 5 MIN PRN
Status: DISCONTINUED | OUTPATIENT
Start: 2021-12-08 | End: 2021-12-08 | Stop reason: HOSPADM

## 2021-12-08 RX ORDER — ONDANSETRON 2 MG/ML
8 INJECTION INTRAMUSCULAR; INTRAVENOUS EVERY 6 HOURS PRN
Status: DISCONTINUED | OUTPATIENT
Start: 2021-12-08 | End: 2021-12-08 | Stop reason: HOSPADM

## 2021-12-08 RX ORDER — LIDOCAINE HYDROCHLORIDE 20 MG/ML
INJECTION, SOLUTION EPIDURAL; INFILTRATION; INTRACAUDAL; PERINEURAL
Status: DISCONTINUED | OUTPATIENT
Start: 2021-12-08 | End: 2021-12-08

## 2021-12-08 RX ORDER — KETOROLAC TROMETHAMINE 30 MG/ML
60 INJECTION, SOLUTION INTRAMUSCULAR; INTRAVENOUS ONCE
Status: DISCONTINUED | OUTPATIENT
Start: 2021-12-08 | End: 2021-12-08 | Stop reason: HOSPADM

## 2021-12-08 RX ORDER — LIDOCAINE HYDROCHLORIDE 10 MG/ML
1 INJECTION, SOLUTION EPIDURAL; INFILTRATION; INTRACAUDAL; PERINEURAL ONCE
Status: DISCONTINUED | OUTPATIENT
Start: 2021-12-08 | End: 2021-12-08 | Stop reason: HOSPADM

## 2021-12-08 RX ORDER — PROMETHAZINE HYDROCHLORIDE 12.5 MG/1
25 SUPPOSITORY RECTAL EVERY 6 HOURS PRN
Status: DISCONTINUED | OUTPATIENT
Start: 2021-12-08 | End: 2021-12-08 | Stop reason: HOSPADM

## 2021-12-08 RX ORDER — MORPHINE SULFATE 10 MG/ML
4 INJECTION INTRAMUSCULAR; INTRAVENOUS; SUBCUTANEOUS EVERY 5 MIN PRN
Status: DISCONTINUED | OUTPATIENT
Start: 2021-12-08 | End: 2021-12-08 | Stop reason: HOSPADM

## 2021-12-08 RX ORDER — CEFAZOLIN SODIUM 2 G/50ML
2 SOLUTION INTRAVENOUS
Status: DISCONTINUED | OUTPATIENT
Start: 2021-12-08 | End: 2021-12-08 | Stop reason: HOSPADM

## 2021-12-08 RX ORDER — FUROSEMIDE 10 MG/ML
INJECTION INTRAMUSCULAR; INTRAVENOUS
Status: DISCONTINUED | OUTPATIENT
Start: 2021-12-08 | End: 2021-12-08

## 2021-12-08 RX ORDER — MIDAZOLAM HYDROCHLORIDE 1 MG/ML
INJECTION INTRAMUSCULAR; INTRAVENOUS
Status: DISCONTINUED | OUTPATIENT
Start: 2021-12-08 | End: 2021-12-08

## 2021-12-08 RX ORDER — TRAMADOL HYDROCHLORIDE 50 MG/1
50 TABLET ORAL EVERY 6 HOURS PRN
Qty: 10 TABLET | Refills: 0 | Status: SHIPPED | OUTPATIENT
Start: 2021-12-08 | End: 2022-05-11

## 2021-12-08 RX ORDER — DIPHENHYDRAMINE HYDROCHLORIDE 50 MG/ML
25 INJECTION INTRAMUSCULAR; INTRAVENOUS EVERY 4 HOURS PRN
Status: DISCONTINUED | OUTPATIENT
Start: 2021-12-08 | End: 2021-12-08 | Stop reason: HOSPADM

## 2021-12-08 RX ORDER — LIDOCAINE HYDROCHLORIDE 10 MG/ML
1 INJECTION INFILTRATION; PERINEURAL ONCE
Status: DISCONTINUED | OUTPATIENT
Start: 2021-12-08 | End: 2021-12-08 | Stop reason: HOSPADM

## 2021-12-08 RX ORDER — FENTANYL CITRATE 50 UG/ML
INJECTION, SOLUTION INTRAMUSCULAR; INTRAVENOUS
Status: DISCONTINUED | OUTPATIENT
Start: 2021-12-08 | End: 2021-12-08

## 2021-12-08 RX ORDER — DEXAMETHASONE SODIUM PHOSPHATE 4 MG/ML
INJECTION, SOLUTION INTRA-ARTICULAR; INTRALESIONAL; INTRAMUSCULAR; INTRAVENOUS; SOFT TISSUE
Status: DISCONTINUED | OUTPATIENT
Start: 2021-12-08 | End: 2021-12-08

## 2021-12-08 RX ORDER — KETOROLAC TROMETHAMINE 30 MG/ML
INJECTION, SOLUTION INTRAMUSCULAR; INTRAVENOUS
Status: DISCONTINUED | OUTPATIENT
Start: 2021-12-08 | End: 2021-12-08

## 2021-12-08 RX ORDER — SODIUM CHLORIDE, SODIUM LACTATE, POTASSIUM CHLORIDE, CALCIUM CHLORIDE 600; 310; 30; 20 MG/100ML; MG/100ML; MG/100ML; MG/100ML
INJECTION, SOLUTION INTRAVENOUS CONTINUOUS
Status: ACTIVE | OUTPATIENT
Start: 2021-12-08 | End: 2021-12-08

## 2021-12-08 RX ORDER — METHYLENE BLUE 5 MG/ML
INJECTION INTRAVENOUS
Status: DISCONTINUED | OUTPATIENT
Start: 2021-12-08 | End: 2021-12-08 | Stop reason: HOSPADM

## 2021-12-08 RX ORDER — ROCURONIUM BROMIDE 10 MG/ML
INJECTION, SOLUTION INTRAVENOUS
Status: DISCONTINUED | OUTPATIENT
Start: 2021-12-08 | End: 2021-12-08

## 2021-12-08 RX ORDER — ONDANSETRON 2 MG/ML
4 INJECTION INTRAMUSCULAR; INTRAVENOUS EVERY 12 HOURS PRN
Status: DISCONTINUED | OUTPATIENT
Start: 2021-12-08 | End: 2021-12-08 | Stop reason: HOSPADM

## 2021-12-08 RX ORDER — MUPIROCIN 20 MG/G
OINTMENT TOPICAL
Status: DISCONTINUED | OUTPATIENT
Start: 2021-12-08 | End: 2021-12-08 | Stop reason: HOSPADM

## 2021-12-08 RX ORDER — ROPIVACAINE HYDROCHLORIDE 7.5 MG/ML
INJECTION, SOLUTION EPIDURAL; PERINEURAL
Status: COMPLETED | OUTPATIENT
Start: 2021-12-08 | End: 2021-12-08

## 2021-12-08 RX ORDER — ONDANSETRON 2 MG/ML
4 INJECTION INTRAMUSCULAR; INTRAVENOUS DAILY PRN
Status: DISCONTINUED | OUTPATIENT
Start: 2021-12-08 | End: 2021-12-08 | Stop reason: HOSPADM

## 2021-12-08 RX ORDER — ONDANSETRON 4 MG/1
8 TABLET, ORALLY DISINTEGRATING ORAL EVERY 8 HOURS PRN
Status: DISCONTINUED | OUTPATIENT
Start: 2021-12-08 | End: 2021-12-08 | Stop reason: HOSPADM

## 2021-12-08 RX ORDER — MORPHINE SULFATE 8 MG/ML
INJECTION INTRAMUSCULAR; INTRAVENOUS; SUBCUTANEOUS
Status: DISCONTINUED | OUTPATIENT
Start: 2021-12-08 | End: 2021-12-08

## 2021-12-08 RX ORDER — ONDANSETRON 2 MG/ML
4 INJECTION INTRAMUSCULAR; INTRAVENOUS EVERY 6 HOURS PRN
Status: DISCONTINUED | OUTPATIENT
Start: 2021-12-08 | End: 2021-12-08 | Stop reason: HOSPADM

## 2021-12-08 RX ORDER — LIDOCAINE HYDROCHLORIDE 40 MG/ML
SOLUTION TOPICAL
Status: DISCONTINUED | OUTPATIENT
Start: 2021-12-08 | End: 2021-12-08 | Stop reason: HOSPADM

## 2021-12-08 RX ORDER — ONDANSETRON 2 MG/ML
4 INJECTION INTRAMUSCULAR; INTRAVENOUS EVERY 12 HOURS PRN
Status: DISCONTINUED | OUTPATIENT
Start: 2021-12-08 | End: 2021-12-08

## 2021-12-08 RX ORDER — MEPERIDINE HYDROCHLORIDE 25 MG/ML
25 INJECTION INTRAMUSCULAR; INTRAVENOUS; SUBCUTANEOUS EVERY 10 MIN PRN
Status: DISCONTINUED | OUTPATIENT
Start: 2021-12-08 | End: 2021-12-08 | Stop reason: HOSPADM

## 2021-12-08 RX ORDER — SODIUM CHLORIDE 9 MG/ML
INJECTION, SOLUTION INTRAVENOUS CONTINUOUS
Status: ACTIVE | OUTPATIENT
Start: 2021-12-08 | End: 2021-12-08

## 2021-12-08 RX ORDER — HYDROCODONE BITARTRATE AND ACETAMINOPHEN 5; 325 MG/1; MG/1
1 TABLET ORAL ONCE
Status: COMPLETED | OUTPATIENT
Start: 2021-12-08 | End: 2021-12-08

## 2021-12-08 RX ORDER — DICLOFENAC POTASSIUM 50 MG/1
50 TABLET, FILM COATED ORAL 4 TIMES DAILY
Qty: 80 TABLET | Refills: 0 | Status: SHIPPED | OUTPATIENT
Start: 2021-12-08 | End: 2021-12-28

## 2021-12-08 RX ORDER — BUPIVACAINE HYDROCHLORIDE 5 MG/ML
INJECTION, SOLUTION EPIDURAL; INTRACAUDAL
Status: DISCONTINUED | OUTPATIENT
Start: 2021-12-08 | End: 2021-12-08 | Stop reason: HOSPADM

## 2021-12-08 RX ORDER — IBUPROFEN 600 MG/1
600 TABLET ORAL EVERY 6 HOURS
Status: DISCONTINUED | OUTPATIENT
Start: 2021-12-09 | End: 2021-12-08 | Stop reason: HOSPADM

## 2021-12-08 RX ADMIN — ONDANSETRON 4 MG: 2 INJECTION INTRAMUSCULAR; INTRAVENOUS at 10:12

## 2021-12-08 RX ADMIN — HYDROMORPHONE HYDROCHLORIDE 0.5 MG: 2 INJECTION INTRAMUSCULAR; INTRAVENOUS; SUBCUTANEOUS at 11:12

## 2021-12-08 RX ADMIN — ROCURONIUM BROMIDE 20 MG: 10 INJECTION, SOLUTION INTRAVENOUS at 08:12

## 2021-12-08 RX ADMIN — FUROSEMIDE 10 MG: 10 INJECTION, SOLUTION INTRAMUSCULAR; INTRAVENOUS at 10:12

## 2021-12-08 RX ADMIN — MIDAZOLAM 2 MG: 1 INJECTION INTRAMUSCULAR; INTRAVENOUS at 07:12

## 2021-12-08 RX ADMIN — SODIUM CHLORIDE, POTASSIUM CHLORIDE, SODIUM LACTATE AND CALCIUM CHLORIDE: 600; 310; 30; 20 INJECTION, SOLUTION INTRAVENOUS at 10:12

## 2021-12-08 RX ADMIN — MORPHINE SULFATE 4 MG: 8 INJECTION INTRAVENOUS at 10:12

## 2021-12-08 RX ADMIN — SCOPALAMINE 1 PATCH: 1 PATCH, EXTENDED RELEASE TRANSDERMAL at 07:12

## 2021-12-08 RX ADMIN — ROPIVACAINE HYDROCHLORIDE 20 ML: 7.5 INJECTION, SOLUTION EPIDURAL; PERINEURAL at 10:12

## 2021-12-08 RX ADMIN — SODIUM CHLORIDE, POTASSIUM CHLORIDE, SODIUM LACTATE AND CALCIUM CHLORIDE: 600; 310; 30; 20 INJECTION, SOLUTION INTRAVENOUS at 07:12

## 2021-12-08 RX ADMIN — LIDOCAINE HYDROCHLORIDE 80 MG: 20 INJECTION, SOLUTION INTRAVENOUS at 07:12

## 2021-12-08 RX ADMIN — PROPOFOL 150 MG: 10 INJECTION, EMULSION INTRAVENOUS at 07:12

## 2021-12-08 RX ADMIN — CEFAZOLIN 2 G: 1 INJECTION, POWDER, FOR SOLUTION INTRAMUSCULAR; INTRAVENOUS; PARENTERAL at 07:12

## 2021-12-08 RX ADMIN — ONDANSETRON 4 MG: 2 INJECTION INTRAMUSCULAR; INTRAVENOUS at 08:12

## 2021-12-08 RX ADMIN — KETOROLAC TROMETHAMINE 30 MG: 30 INJECTION, SOLUTION INTRAMUSCULAR; INTRAVENOUS at 10:12

## 2021-12-08 RX ADMIN — DEXAMETHASONE SODIUM PHOSPHATE 8 MG: 4 INJECTION, SOLUTION INTRA-ARTICULAR; INTRALESIONAL; INTRAMUSCULAR; INTRAVENOUS; SOFT TISSUE at 08:12

## 2021-12-08 RX ADMIN — SUGAMMADEX 200 MG: 100 INJECTION, SOLUTION INTRAVENOUS at 10:12

## 2021-12-08 RX ADMIN — ACETAMINOPHEN 500 MG: 500 TABLET ORAL at 12:12

## 2021-12-08 RX ADMIN — FENTANYL CITRATE 100 MCG: 50 INJECTION INTRAMUSCULAR; INTRAVENOUS at 07:12

## 2021-12-08 RX ADMIN — HYDROCODONE BITARTRATE AND ACETAMINOPHEN 1 TABLET: 5; 325 TABLET ORAL at 12:12

## 2021-12-08 RX ADMIN — ROCURONIUM BROMIDE 50 MG: 10 INJECTION, SOLUTION INTRAVENOUS at 07:12

## 2021-12-08 RX ADMIN — KETOROLAC TROMETHAMINE 30 MG: 30 INJECTION, SOLUTION INTRAMUSCULAR at 10:12

## 2021-12-09 ENCOUNTER — OFFICE VISIT (OUTPATIENT)
Dept: OBSTETRICS AND GYNECOLOGY | Facility: CLINIC | Age: 46
End: 2021-12-09
Payer: MEDICAID

## 2021-12-09 DIAGNOSIS — Z09 FOLLOW UP: Primary | ICD-10-CM

## 2021-12-09 LAB
ESTROGEN SERPL-MCNC: NORMAL PG/ML
LAB AP GROSS DESCRIPTION: NORMAL
LAB AP LABORATORY NOTES: NORMAL
T3RU NFR SERPL: NORMAL %

## 2021-12-09 PROCEDURE — 99024 PR POST-OP FOLLOW-UP VISIT: ICD-10-PCS | Mod: ,,, | Performed by: OBSTETRICS & GYNECOLOGY

## 2021-12-09 PROCEDURE — 99024 POSTOP FOLLOW-UP VISIT: CPT | Mod: ,,, | Performed by: OBSTETRICS & GYNECOLOGY

## 2021-12-23 ENCOUNTER — OFFICE VISIT (OUTPATIENT)
Dept: OBSTETRICS AND GYNECOLOGY | Facility: CLINIC | Age: 46
End: 2021-12-23
Payer: COMMERCIAL

## 2021-12-23 VITALS
RESPIRATION RATE: 16 BRPM | DIASTOLIC BLOOD PRESSURE: 105 MMHG | SYSTOLIC BLOOD PRESSURE: 158 MMHG | HEART RATE: 72 BPM | WEIGHT: 165 LBS | BODY MASS INDEX: 26.52 KG/M2 | TEMPERATURE: 97 F | HEIGHT: 66 IN

## 2021-12-23 DIAGNOSIS — N93.9 VAGINAL SPOTTING: ICD-10-CM

## 2021-12-23 DIAGNOSIS — Z09 FOLLOW-UP EXAMINATION AFTER GYNECOLOGICAL SURGERY: Primary | ICD-10-CM

## 2021-12-23 LAB
BASOPHILS # BLD AUTO: 0.03 K/UL (ref 0–0.2)
BASOPHILS NFR BLD AUTO: 0.5 % (ref 0–1)
DIFFERENTIAL METHOD BLD: ABNORMAL
EOSINOPHIL # BLD AUTO: 0.14 K/UL (ref 0–0.5)
EOSINOPHIL NFR BLD AUTO: 2.1 % (ref 1–4)
ERYTHROCYTE [DISTWIDTH] IN BLOOD BY AUTOMATED COUNT: 16.6 % (ref 11.5–14.5)
HCT VFR BLD AUTO: 33.4 % (ref 38–47)
HGB BLD-MCNC: 10.1 G/DL (ref 12–16)
IMM GRANULOCYTES # BLD AUTO: 0.02 K/UL (ref 0–0.04)
IMM GRANULOCYTES NFR BLD: 0.3 % (ref 0–0.4)
LYMPHOCYTES # BLD AUTO: 1.66 K/UL (ref 1–4.8)
LYMPHOCYTES NFR BLD AUTO: 25.4 % (ref 27–41)
MCH RBC QN AUTO: 24.5 PG (ref 27–31)
MCHC RBC AUTO-ENTMCNC: 30.2 G/DL (ref 32–36)
MCV RBC AUTO: 80.9 FL (ref 80–96)
MONOCYTES # BLD AUTO: 0.37 K/UL (ref 0–0.8)
MONOCYTES NFR BLD AUTO: 5.7 % (ref 2–6)
MPC BLD CALC-MCNC: 10.7 FL (ref 9.4–12.4)
NEUTROPHILS # BLD AUTO: 4.32 K/UL (ref 1.8–7.7)
NEUTROPHILS NFR BLD AUTO: 66 % (ref 53–65)
NRBC # BLD AUTO: 0 X10E3/UL
NRBC, AUTO (.00): 0 %
PLATELET # BLD AUTO: 359 K/UL (ref 150–400)
RBC # BLD AUTO: 4.13 M/UL (ref 4.2–5.4)
WBC # BLD AUTO: 6.54 K/UL (ref 4.5–11)

## 2021-12-23 PROCEDURE — 36415 COLL VENOUS BLD VENIPUNCTURE: CPT | Mod: ,,, | Performed by: OBSTETRICS & GYNECOLOGY

## 2021-12-23 PROCEDURE — 85025 COMPLETE CBC W/AUTO DIFF WBC: CPT | Mod: ,,, | Performed by: CLINICAL MEDICAL LABORATORY

## 2021-12-23 PROCEDURE — 85025 CBC WITH DIFFERENTIAL: ICD-10-PCS | Mod: ,,, | Performed by: CLINICAL MEDICAL LABORATORY

## 2021-12-23 PROCEDURE — 36415 PR COLLECTION VENOUS BLOOD,VENIPUNCTURE: ICD-10-PCS | Mod: ,,, | Performed by: OBSTETRICS & GYNECOLOGY

## 2021-12-23 PROCEDURE — 99024 PR POST-OP FOLLOW-UP VISIT: ICD-10-PCS | Mod: ,,, | Performed by: OBSTETRICS & GYNECOLOGY

## 2021-12-23 PROCEDURE — 99024 POSTOP FOLLOW-UP VISIT: CPT | Mod: ,,, | Performed by: OBSTETRICS & GYNECOLOGY

## 2022-01-04 ENCOUNTER — HOSPITAL ENCOUNTER (INPATIENT)
Facility: HOSPITAL | Age: 47
LOS: 8 days | Discharge: HOME | DRG: 337 | End: 2022-01-12
Attending: EMERGENCY MEDICINE | Admitting: SURGERY
Payer: MEDICAID

## 2022-01-04 DIAGNOSIS — R10.9 ABDOMINAL PAIN: ICD-10-CM

## 2022-01-04 DIAGNOSIS — K56.609 SMALL BOWEL OBSTRUCTION: Primary | ICD-10-CM

## 2022-01-04 LAB
ALBUMIN SERPL BCP-MCNC: 4.3 G/DL (ref 3.5–5)
ALBUMIN/GLOB SERPL: 1.1 {RATIO}
ALP SERPL-CCNC: 86 U/L (ref 39–100)
ALT SERPL W P-5'-P-CCNC: 24 U/L (ref 13–56)
AMYLASE SERPL-CCNC: 97 U/L (ref 25–115)
ANION GAP SERPL CALCULATED.3IONS-SCNC: 22 MMOL/L (ref 7–16)
ANISOCYTOSIS BLD QL SMEAR: ABNORMAL
AST SERPL W P-5'-P-CCNC: 38 U/L (ref 15–37)
BASOPHILS # BLD AUTO: 0.02 K/UL (ref 0–0.2)
BASOPHILS NFR BLD AUTO: 0.2 % (ref 0–1)
BILIRUB SERPL-MCNC: 0.8 MG/DL (ref 0–1.2)
BUN SERPL-MCNC: 16 MG/DL (ref 7–18)
BUN/CREAT SERPL: 18 (ref 6–20)
CALCIUM SERPL-MCNC: 10 MG/DL (ref 8.5–10.1)
CHLORIDE SERPL-SCNC: 103 MMOL/L (ref 98–107)
CO2 SERPL-SCNC: 20 MMOL/L (ref 21–32)
CREAT SERPL-MCNC: 0.89 MG/DL (ref 0.55–1.02)
DIFFERENTIAL METHOD BLD: ABNORMAL
EOSINOPHIL # BLD AUTO: 0 K/UL (ref 0–0.5)
EOSINOPHIL NFR BLD AUTO: 0 % (ref 1–4)
ERYTHROCYTE [DISTWIDTH] IN BLOOD BY AUTOMATED COUNT: 15.9 % (ref 11.5–14.5)
GLOBULIN SER-MCNC: 3.9 G/DL (ref 2–4)
GLUCOSE SERPL-MCNC: 147 MG/DL (ref 74–106)
HCT VFR BLD AUTO: 39.9 % (ref 38–47)
HGB BLD-MCNC: 12.8 G/DL (ref 12–16)
IMM GRANULOCYTES # BLD AUTO: 0.04 K/UL (ref 0–0.04)
IMM GRANULOCYTES NFR BLD: 0.3 % (ref 0–0.4)
LACTATE SERPL-SCNC: 1.6 MMOL/L (ref 0.4–2)
LACTATE SERPL-SCNC: 2.9 MMOL/L (ref 0.4–2)
LIPASE SERPL-CCNC: 104 U/L (ref 73–393)
LYMPHOCYTES # BLD AUTO: 0.72 K/UL (ref 1–4.8)
LYMPHOCYTES NFR BLD AUTO: 5.8 % (ref 27–41)
LYMPHOCYTES NFR BLD MANUAL: 10 % (ref 27–41)
MAGNESIUM SERPL-MCNC: 1.9 MG/DL (ref 1.7–2.3)
MCH RBC QN AUTO: 23.8 PG (ref 27–31)
MCHC RBC AUTO-ENTMCNC: 32.1 G/DL (ref 32–36)
MCV RBC AUTO: 74.3 FL (ref 80–96)
MICROCYTES BLD QL SMEAR: ABNORMAL
MONOCYTES # BLD AUTO: 0.33 K/UL (ref 0–0.8)
MONOCYTES NFR BLD AUTO: 2.6 % (ref 2–6)
MONOCYTES NFR BLD MANUAL: 2 % (ref 2–6)
MPC BLD CALC-MCNC: 10.2 FL (ref 9.4–12.4)
NEUTROPHILS # BLD AUTO: 11.36 K/UL (ref 1.8–7.7)
NEUTROPHILS NFR BLD AUTO: 91.1 % (ref 53–65)
NEUTS BAND NFR BLD MANUAL: 1 % (ref 1–5)
NEUTS SEG NFR BLD MANUAL: 87 % (ref 50–62)
NRBC # BLD AUTO: 0 X10E3/UL
NRBC, AUTO (.00): 0 %
OVALOCYTES BLD QL SMEAR: ABNORMAL
PLATELET # BLD AUTO: 422 K/UL (ref 150–400)
PLATELET MORPHOLOGY: ABNORMAL
POTASSIUM SERPL-SCNC: 5.4 MMOL/L (ref 3.5–5.1)
PROT SERPL-MCNC: 8.2 G/DL (ref 6.4–8.2)
RBC # BLD AUTO: 5.37 M/UL (ref 4.2–5.4)
SARS-COV-2 RDRP RESP QL NAA+PROBE: NEGATIVE
SODIUM SERPL-SCNC: 140 MMOL/L (ref 136–145)
TROPONIN I SERPL HS-MCNC: 4.7 PG/ML
WBC # BLD AUTO: 12.47 K/UL (ref 4.5–11)

## 2022-01-04 PROCEDURE — 36415 COLL VENOUS BLD VENIPUNCTURE: CPT | Performed by: EMERGENCY MEDICINE

## 2022-01-04 PROCEDURE — 93005 ELECTROCARDIOGRAM TRACING: CPT

## 2022-01-04 PROCEDURE — 83735 ASSAY OF MAGNESIUM: CPT | Performed by: EMERGENCY MEDICINE

## 2022-01-04 PROCEDURE — 25000003 PHARM REV CODE 250: Performed by: EMERGENCY MEDICINE

## 2022-01-04 PROCEDURE — 99285 EMERGENCY DEPT VISIT HI MDM: CPT | Mod: 25

## 2022-01-04 PROCEDURE — 93010 EKG 12-LEAD: ICD-10-PCS | Mod: ,,, | Performed by: HOSPITALIST

## 2022-01-04 PROCEDURE — 99284 PR EMERGENCY DEPT VISIT,LEVEL IV: ICD-10-PCS | Mod: ,,, | Performed by: EMERGENCY MEDICINE

## 2022-01-04 PROCEDURE — 25000003 PHARM REV CODE 250: Performed by: NURSE PRACTITIONER

## 2022-01-04 PROCEDURE — 96361 HYDRATE IV INFUSION ADD-ON: CPT

## 2022-01-04 PROCEDURE — 85025 COMPLETE CBC W/AUTO DIFF WBC: CPT | Performed by: EMERGENCY MEDICINE

## 2022-01-04 PROCEDURE — 63600175 PHARM REV CODE 636 W HCPCS: Performed by: EMERGENCY MEDICINE

## 2022-01-04 PROCEDURE — 83605 ASSAY OF LACTIC ACID: CPT | Performed by: EMERGENCY MEDICINE

## 2022-01-04 PROCEDURE — 25500020 PHARM REV CODE 255: Performed by: EMERGENCY MEDICINE

## 2022-01-04 PROCEDURE — 83690 ASSAY OF LIPASE: CPT | Performed by: EMERGENCY MEDICINE

## 2022-01-04 PROCEDURE — 63600175 PHARM REV CODE 636 W HCPCS: Performed by: NURSE PRACTITIONER

## 2022-01-04 PROCEDURE — 96374 THER/PROPH/DIAG INJ IV PUSH: CPT

## 2022-01-04 PROCEDURE — 84484 ASSAY OF TROPONIN QUANT: CPT | Performed by: EMERGENCY MEDICINE

## 2022-01-04 PROCEDURE — 87635 SARS-COV-2 COVID-19 AMP PRB: CPT | Performed by: SURGERY

## 2022-01-04 PROCEDURE — 93010 ELECTROCARDIOGRAM REPORT: CPT | Mod: ,,, | Performed by: HOSPITALIST

## 2022-01-04 PROCEDURE — 82150 ASSAY OF AMYLASE: CPT | Performed by: EMERGENCY MEDICINE

## 2022-01-04 PROCEDURE — 96375 TX/PRO/DX INJ NEW DRUG ADDON: CPT

## 2022-01-04 PROCEDURE — 80053 COMPREHEN METABOLIC PANEL: CPT | Performed by: EMERGENCY MEDICINE

## 2022-01-04 PROCEDURE — 99284 EMERGENCY DEPT VISIT MOD MDM: CPT | Mod: ,,, | Performed by: EMERGENCY MEDICINE

## 2022-01-04 PROCEDURE — 11000001 HC ACUTE MED/SURG PRIVATE ROOM

## 2022-01-04 RX ORDER — IBUPROFEN 400 MG/1
400 TABLET ORAL EVERY 6 HOURS PRN
Status: DISCONTINUED | OUTPATIENT
Start: 2022-01-04 | End: 2022-01-12 | Stop reason: HOSPADM

## 2022-01-04 RX ORDER — SODIUM CHLORIDE 9 MG/ML
INJECTION, SOLUTION INTRAVENOUS
Status: COMPLETED | OUTPATIENT
Start: 2022-01-04 | End: 2022-01-04

## 2022-01-04 RX ORDER — KETOROLAC TROMETHAMINE 30 MG/ML
15 INJECTION, SOLUTION INTRAMUSCULAR; INTRAVENOUS EVERY 6 HOURS PRN
Status: DISPENSED | OUTPATIENT
Start: 2022-01-04 | End: 2022-01-07

## 2022-01-04 RX ORDER — LISINOPRIL 20 MG/1
20 TABLET ORAL 2 TIMES DAILY
Status: DISCONTINUED | OUTPATIENT
Start: 2022-01-04 | End: 2022-01-12 | Stop reason: HOSPADM

## 2022-01-04 RX ORDER — ONDANSETRON 2 MG/ML
4 INJECTION INTRAMUSCULAR; INTRAVENOUS
Status: COMPLETED | OUTPATIENT
Start: 2022-01-04 | End: 2022-01-04

## 2022-01-04 RX ORDER — ONDANSETRON 2 MG/ML
4 INJECTION INTRAMUSCULAR; INTRAVENOUS EVERY 8 HOURS PRN
Status: DISCONTINUED | OUTPATIENT
Start: 2022-01-04 | End: 2022-01-12 | Stop reason: HOSPADM

## 2022-01-04 RX ORDER — SODIUM CHLORIDE 9 MG/ML
INJECTION, SOLUTION INTRAVENOUS CONTINUOUS
Status: DISCONTINUED | OUTPATIENT
Start: 2022-01-04 | End: 2022-01-06

## 2022-01-04 RX ORDER — ACETAMINOPHEN 325 MG/1
650 TABLET ORAL EVERY 8 HOURS PRN
Status: DISCONTINUED | OUTPATIENT
Start: 2022-01-04 | End: 2022-01-12 | Stop reason: HOSPADM

## 2022-01-04 RX ORDER — FAMOTIDINE 10 MG/ML
20 INJECTION INTRAVENOUS EVERY 12 HOURS
Status: DISCONTINUED | OUTPATIENT
Start: 2022-01-04 | End: 2022-01-08

## 2022-01-04 RX ORDER — TALC
6 POWDER (GRAM) TOPICAL NIGHTLY PRN
Status: DISCONTINUED | OUTPATIENT
Start: 2022-01-04 | End: 2022-01-12 | Stop reason: HOSPADM

## 2022-01-04 RX ORDER — HEPARIN SODIUM 5000 [USP'U]/ML
5000 INJECTION, SOLUTION INTRAVENOUS; SUBCUTANEOUS EVERY 8 HOURS
Status: DISCONTINUED | OUTPATIENT
Start: 2022-01-04 | End: 2022-01-12 | Stop reason: HOSPADM

## 2022-01-04 RX ORDER — PROCHLORPERAZINE EDISYLATE 5 MG/ML
5 INJECTION INTRAMUSCULAR; INTRAVENOUS EVERY 6 HOURS PRN
Status: DISCONTINUED | OUTPATIENT
Start: 2022-01-04 | End: 2022-01-12 | Stop reason: HOSPADM

## 2022-01-04 RX ORDER — SODIUM CHLORIDE 0.9 % (FLUSH) 0.9 %
10 SYRINGE (ML) INJECTION
Status: DISCONTINUED | OUTPATIENT
Start: 2022-01-04 | End: 2022-01-12 | Stop reason: HOSPADM

## 2022-01-04 RX ORDER — MORPHINE SULFATE 4 MG/ML
4 INJECTION, SOLUTION INTRAMUSCULAR; INTRAVENOUS
Status: COMPLETED | OUTPATIENT
Start: 2022-01-04 | End: 2022-01-04

## 2022-01-04 RX ORDER — MORPHINE SULFATE 4 MG/ML
4 INJECTION, SOLUTION INTRAMUSCULAR; INTRAVENOUS EVERY 4 HOURS PRN
Status: DISCONTINUED | OUTPATIENT
Start: 2022-01-04 | End: 2022-01-12 | Stop reason: HOSPADM

## 2022-01-04 RX ADMIN — ONDANSETRON 4 MG: 2 INJECTION INTRAMUSCULAR; INTRAVENOUS at 10:01

## 2022-01-04 RX ADMIN — HEPARIN SODIUM 5000 UNITS: 5000 INJECTION INTRAVENOUS; SUBCUTANEOUS at 09:01

## 2022-01-04 RX ADMIN — SODIUM CHLORIDE: 9 INJECTION, SOLUTION INTRAVENOUS at 11:01

## 2022-01-04 RX ADMIN — SODIUM CHLORIDE 1000 ML: 9 INJECTION, SOLUTION INTRAVENOUS at 10:01

## 2022-01-04 RX ADMIN — MORPHINE SULFATE 4 MG: 4 INJECTION, SOLUTION INTRAMUSCULAR; INTRAVENOUS at 07:01

## 2022-01-04 RX ADMIN — ONDANSETRON 4 MG: 2 INJECTION INTRAMUSCULAR; INTRAVENOUS at 07:01

## 2022-01-04 RX ADMIN — MORPHINE SULFATE 4 MG: 4 INJECTION, SOLUTION INTRAMUSCULAR; INTRAVENOUS at 03:01

## 2022-01-04 RX ADMIN — MORPHINE SULFATE 4 MG: 4 INJECTION INTRAVENOUS at 10:01

## 2022-01-04 RX ADMIN — IOPAMIDOL 100 ML: 755 INJECTION, SOLUTION INTRAVENOUS at 01:01

## 2022-01-04 RX ADMIN — FAMOTIDINE 20 MG: 10 INJECTION, SOLUTION INTRAVENOUS at 09:01

## 2022-01-04 RX ADMIN — TOPICAL ANESTHETIC 1 EACH: 200 SPRAY DENTAL; PERIODONTAL at 03:01

## 2022-01-04 RX ADMIN — SODIUM CHLORIDE: 9 INJECTION, SOLUTION INTRAVENOUS at 02:01

## 2022-01-04 NOTE — ED PROVIDER NOTES
Encounter Date: 2022    SCRIBE #1 NOTE: I, Susannah Dwyer, am scribing for, and in the presence of,  Elmo Skaggs. I have scribed the entire note.       History     Chief Complaint   Patient presents with    Abdominal Pain     The patient is a 46 year old female with complaints of vomiting. She states she began to feel bad last night and vomited the first time at 9pm. Ever since she states she has vomited every 30 minutes until 9 this morning. She also reports vomiting bowel this morning and complains of abdominal pain. She denies any diarrhea. The patient had a hysterectomy on the , had Covid in May, and is fully vaccinated.    The history is provided by the patient. No  was used.     Review of patient's allergies indicates:  No Known Allergies  Past Medical History:   Diagnosis Date    H/O atrioventricular jocy ablation     Hypertension      Past Surgical History:   Procedure Laterality Date    AUGMENTATION OF BREAST       SECTION      DIAGNOSTIC LAPAROSCOPY N/A 2022    Procedure: LAPAROSCOPY, DIAGNOSTIC;  Surgeon: Alexis Calabrese MD;  Location: [facility] OR;  Service: General;  Laterality: N/A;  LYSIS OF ADHESIONS    LEFT ARM SURGERY      LEFT LEG SURGERY      JOSE AND SCREWS    ROBOT-ASSISTED LAPAROSCOPIC HYSTERECTOMY N/A 2021    Procedure: ROBOTIC HYSTERECTOMY,POSS BSO,CYSTO ;  Surgeon: Young Jackson MD;  Location: [facility] OR;  Service: OB/GYN;  Laterality: N/A;    ROBOT-ASSISTED LAPAROSCOPIC SALPINGO-OOPHORECTOMY Left 2021    Procedure: ROBOTIC SALPINGO-OOPHORECTOMY;  Surgeon: Young Jackson MD;  Location: [facility];  Service: OB/GYN;  Laterality: Left;    TUBAL LIGATION      VAGINAL DELIVERY      X 2     Family History   Problem Relation Age of Onset    Colon cancer Mother     Lung cancer Mother     Heart disease Paternal Grandmother     Hypertension Father     Heart disease Father      Social History     Tobacco Use    Smoking  status: Never Smoker    Smokeless tobacco: Never Used   Substance Use Topics    Alcohol use: Not Currently    Drug use: Never     Review of Systems   Gastrointestinal: Positive for abdominal pain, nausea and vomiting. Negative for diarrhea.   All other systems reviewed and are negative.      Physical Exam     Initial Vitals [01/04/22 0941]   BP Pulse Resp Temp SpO2   (!) 155/100 101 16 97.9 °F (36.6 °C) 96 %      MAP       --         Physical Exam    Constitutional: She appears well-developed and well-nourished.   HENT:   Head: Normocephalic and atraumatic.   Eyes: EOM are normal. Pupils are equal, round, and reactive to light.   Neck: Neck supple.   Normal range of motion.  Cardiovascular: Normal rate, regular rhythm, normal heart sounds and intact distal pulses.   Pulmonary/Chest: Breath sounds normal. No respiratory distress.   Abdominal: Abdomen is soft. She exhibits no distension. There is abdominal tenderness.   Musculoskeletal:         General: No edema. Normal range of motion.      Cervical back: Normal range of motion and neck supple.     Neurological: She is alert and oriented to person, place, and time.   Skin: Skin is warm and dry.   Psychiatric: She has a normal mood and affect. Thought content normal.         ED Course   Procedures  Labs Reviewed   COMPREHENSIVE METABOLIC PANEL - Abnormal; Notable for the following components:       Result Value    Potassium 5.4 (*)     CO2 20 (*)     Anion Gap 22 (*)     Glucose 147 (*)     AST 38 (*)     All other components within normal limits   LACTIC ACID, PLASMA - Abnormal; Notable for the following components:    Lactic Acid 2.9 (*)     All other components within normal limits   CBC WITH DIFFERENTIAL - Abnormal; Notable for the following components:    WBC 12.47 (*)     MCV 74.3 (*)     MCH 23.8 (*)     RDW 15.9 (*)     Platelet Count 422 (*)     Neutrophils % 91.1 (*)     Lymphocytes % 5.8 (*)     Eosinophils % 0.0 (*)     Neutrophils, Abs 11.36 (*)      Lymphocytes, Absolute 0.72 (*)     All other components within normal limits   MANUAL DIFFERENTIAL - Abnormal; Notable for the following components:    Segmented Neutrophils, Man % 87 (*)     Lymphocytes, Man % 10 (*)     Platelet Morphology Increased (*)     All other components within normal limits   AMYLASE - Normal   LIPASE - Normal   TROPONIN I - Normal   MAGNESIUM - Normal   LACTIC ACID, PLASMA - Normal   SARS-COV-2 RNA AMPLIFICATION, QUAL - Normal   CBC W/ AUTO DIFFERENTIAL    Narrative:     The following orders were created for panel order CBC auto differential.  Procedure                               Abnormality         Status                     ---------                               -----------         ------                     CBC with Differential[[id number]]        Abnormal            Final result               Manual Differential[[id number]]          Abnormal            Final result                 Please view results for these tests on the individual orders.   EXTRA TUBES    Narrative:     The following orders were created for panel order EXTRA TUBES.  Procedure                               Abnormality         Status                     ---------                               -----------         ------                     Light Blue Top Hold[[id number]]                              In process                   Please view results for these tests on the individual orders.   LIGHT BLUE TOP HOLD     EKG Readings: (Independently Interpreted)   Initial Reading: No STEMI. Rhythm: Normal Sinus Rhythm. Heart Rate: 80.   Interpreted by  at 10:05     ECG Results          EKG 12-lead (Final result)  Result time 01/09/22 07:26:25    Final result by Interface, Lab In [facility] (01/09/22 07:26:25)                 Narrative:    Test Reason : R10.9,    Vent. Rate : 080 BPM     Atrial Rate : 000 BPM     P-R Int : 144 ms          QRS Dur : 086 ms      QT Int : 410 ms       P-R-T Axes : 035 008 046 degrees      QTc Int : 445 ms    Sinus rhythm  Poor R wave progression  Borderline ECG    Confirmed by Kenton Atkins MD ([id number]) on 1/9/2022 7:26:15 AM    Referred By: [email or web address]   SELF           Confirmed By:Kenton Atkins MD                            Imaging Results          XR Non-Rad Performed NG/Gastric Tube Check (Final result)  Result time 01/04/22 15:56:19    Final result by Fercho Carranza MD (01/04/22 15:56:19)                 Impression:      Enteric tube over the stomach      Electronically signed by: Fercho Carranza  Date:    01/04/2022  Time:    15:56             Narrative:    EXAMINATION:  XR NON-RADIOLOGIST PERFORMED NG/GASTRIC TUBE CHECK    CLINICAL HISTORY:  ng tube plc;    TECHNIQUE:  AP view of the lower chest and upper abdomen for enteric tube localization.    COMPARISON:  None    FINDINGS:  Enteric tube over the stomach                               CT Abdomen Pelvis With Contrast (Final result)  Result time 01/04/22 13:37:20    Final result by Fercho Carranza MD (01/04/22 13:37:20)                 Impression:      Findings indicative of a small bowel obstruction as detailed.      Electronically signed by: Fercho Carranza  Date:    01/04/2022  Time:    13:37             Narrative:    EXAMINATION:  CT ABDOMEN PELVIS WITH CONTRAST    CLINICAL HISTORY:  Abdominal abscess/infection suspected;    TECHNIQUE:  Low dose axial images, sagittal and coronal reformations were obtained from the lung bases to the pubic symphysis following the IV administration of 100 mL of Isovue 370    COMPARISON:  06/08/2015 CT abdomen and pelvis without contrast    FINDINGS:  The lung bases are clear.  Heart normal size.    The liver and gallbladder appear normal.  Adrenals, kidneys, spleen, and pancreas appear normal.    No pneumoperitoneum.  Small amount of free fluid in the pelvis.    There are dilated loops of small bowel with what appears to be a transition point within the distal ileum proximal to the ileocecal valve.  This is located  centrally within the lower pelvis just above the bladder.    Vascular structures are unremarkable.  No adenopathy.    There is no fracture or osseous lesion.                               X-Ray Abdomen Flat And Erect (Final result)  Result time 01/04/22 12:01:38    Final result by Adiel Flowers II, MD (01/04/22 12:01:38)                 Impression:      Prominent bowel loops left upper abdomen could indicate enteritis and or ileus.      Electronically signed by: Adiel Flowers  Date:    01/04/2022  Time:    12:01             Narrative:    EXAMINATION:  XR ABDOMEN FLAT AND ERECT    CLINICAL HISTORY:  Abdominal pain    COMPARISON:  None available    FINDINGS:  No free fluid or free air seen.  Prominent loops of small bowel present in the left upper abdomen.  Remaining bowel gas pattern appears within normal limits.  No abnormal calcifications are present.  No other abnormality is identified.                                 Medications   ketorolac injection 15 mg (15 mg Intravenous Given 1/6/22 0918)   ketorolac injection 15 mg (15 mg Intravenous Not Given 1/9/22 0600)   ondansetron injection 4 mg (4 mg Intravenous Given 1/4/22 1043)   morphine injection 4 mg (4 mg Intravenous Given 1/4/22 1043)   sodium chloride 0.9% bolus 1,000 mL (0 mLs Intravenous Stopped 1/4/22 1143)   iopamidoL (ISOVUE-370) injection 100 mL (100 mLs Intravenous Given 1/4/22 1312)   0.9%  NaCl infusion ( Intravenous New Bag 1/4/22 1428)   potassium chloride 10 mEq in 100 mL IVPB (0 mEq Intravenous Stopped 1/10/22 1443)   iopamidoL (ISOVUE-370) injection 100 mL (100 mLs Intravenous Given 1/10/22 1652)   magnesium sulfate 2g in water 50mL IVPB (premix) (0 g Intravenous Stopped 1/10/22 2329)                Attending Attestation:           Physician Attestation for Scribe:  Physician Attestation Statement for Scribe #1: I, MARIANNA SWANSON, reviewed documentation, as scribed by DAVID BURGER in my presence, and it is both accurate and complete.                       Clinical Impression:   Final diagnoses:  [R10.9] Abdominal pain  [K56.609] Small bowel obstruction (Primary)          ED Disposition Condition    Admit               Elmo Skaggs MD  01/27/22 0976

## 2022-01-04 NOTE — HPI
46 y'o female chronic constipation previous C section hysterectomy last month per Dr. Jackson (December 8th),  reportedly had adhesions at hysterectomy unable to have a good bowel movement,  Has been several days without bowel movement, no flatus, belching yesterday abdominal cramping, bloating, n/v, ct concerning for sbo with transition point at ileum  Refusing NG tube, attempting to vomit during exam PMH:  HTN, cardiac ablation 2012 for SVT

## 2022-01-04 NOTE — ED TRIAGE NOTES
"Pt presents to ed with c/o vomiting and cp. Pt states abdomen is swollen and she is now "throwing up poop". Pt states last bm 1 week ago. Pt states hysterectomy on the 8th with complications. Renetta for hysterectomy. vomitin onset was last night at 9pm  "

## 2022-01-04 NOTE — H&P
[facility] - Emergency Department  General Surgery  History & Physical    Patient Name: Josefina Izquierdo  MRN: [id number]  Admission Date: 2022  Attending Physician: Alexis Calabrese MD   Primary Care Provider: Mark Dean MD    Patient information was obtained from patient and ER records.     Subjective:     Chief Complaint/Reason for Admission: abdominal pain, distention, n/v, constipation    History of Present Illness: 46 y'o female chronic constipation previous C section hysterectomy last month per Dr. Jackson (),  reportedly had adhesions at hysterectomy unable to have a good bowel movement,  Has been several days without bowel movement, no flatus, belching yesterday abdominal cramping, bloating, n/v, ct concerning for sbo with transition point at ileum  Refusing NG tube, attempting to vomit during exam PMH:  HTN, cardiac ablation  for SVT        No current facility-administered medications on file prior to encounter.     Current Outpatient Medications on File Prior to Encounter   Medication Sig    ascorbic acid, vitamin C, 1,000 mg TbSR Take 1,000 mg by mouth 2 (two) times a day.    lisinopriL (PRINIVIL,ZESTRIL) 20 MG tablet Take 20 mg by mouth 2 (two) times daily.    traMADoL (ULTRAM) 50 mg tablet Take 1 tablet (50 mg total) by mouth every 6 (six) hours as needed for Pain.       Review of patient's allergies indicates:  No Known Allergies    Past Medical History:   Diagnosis Date    H/O atrioventricular jocy ablation     Hypertension      Past Surgical History:   Procedure Laterality Date    AUGMENTATION OF BREAST       SECTION      LEFT ARM SURGERY      LEFT LEG SURGERY      JOSE AND SCREWS    ROBOT-ASSISTED LAPAROSCOPIC HYSTERECTOMY N/A 2021    Procedure: ROBOTIC HYSTERECTOMY,POSS BSO,CYSTO ;  Surgeon: Young Jackson MD;  Location: [facility];  Service: OB/GYN;  Laterality: N/A;    ROBOT-ASSISTED LAPAROSCOPIC SALPINGO-OOPHORECTOMY Left 2021     Procedure: ROBOTIC SALPINGO-OOPHORECTOMY;  Surgeon: Young Jackson MD;  Location: [facility];  Service: OB/GYN;  Laterality: Left;    TUBAL LIGATION      VAGINAL DELIVERY      X 2     Family History     Problem Relation (Age of Onset)    Colon cancer Mother    Heart disease Paternal Grandmother, Father    Hypertension Father    Lung cancer Mother        Tobacco Use    Smoking status: Never Smoker    Smokeless tobacco: Never Used   Substance and Sexual Activity    Alcohol use: Not Currently    Drug use: Never    Sexual activity: Yes     Partners: Male     Birth control/protection: None     Review of Systems   Constitutional: Positive for unexpected weight change.   Gastrointestinal: Positive for abdominal distention, abdominal pain, constipation, nausea and vomiting.   All other systems reviewed and are negative.    Objective:     Vital Signs (Most Recent):  Temp: 97.9 °F (36.6 °C) (01/04/22 0941)  Pulse: 107 (01/04/22 1429)  Resp: 18 (01/04/22 1429)  BP: (!) 171/97 (01/04/22 1429)  SpO2: 99 % (01/04/22 1429) Vital Signs (24h Range):  Temp:  [97.9 °F (36.6 °C)] 97.9 °F (36.6 °C)  Pulse:  [] 107  Resp:  [16-19] 18  SpO2:  [96 %-99 %] 99 %  BP: (135-171)/() 171/97     Weight: 72.6 kg (160 lb)  Body mass index is 25.82 kg/m².    Physical Exam  Vitals and nursing note reviewed.   HENT:      Head: Normocephalic.      Mouth/Throat:      Mouth: Mucous membranes are dry.   Eyes:      Conjunctiva/sclera: Conjunctivae normal.   Cardiovascular:      Rate and Rhythm: Tachycardia present.      Pulses: Normal pulses.   Pulmonary:      Effort: Pulmonary effort is normal.      Breath sounds: Normal breath sounds.   Abdominal:      General: There is distension.   Skin:     General: Skin is warm and dry.      Capillary Refill: Capillary refill takes less than 2 seconds.   Neurological:      General: No focal deficit present.   Psychiatric:         Mood and Affect: Mood normal.         Significant Labs:  I have  reviewed all pertinent lab results within the past 24 hours.  CBC:   Recent Labs   Lab 01/04/22  1033   WBC 12.47*   RBC 5.37   HGB 12.8   HCT 39.9   *   MCV 74.3*   MCH 23.8*   MCHC 32.1     BMP:   Recent Labs   Lab 01/04/22  1033   *      K 5.4*      CO2 20*   BUN 16   CREATININE 0.89   CALCIUM 10.0   MG 1.9     CMP:   Recent Labs   Lab 01/04/22  1033   *   CALCIUM 10.0   ALBUMIN 4.3   PROT 8.2      K 5.4*   CO2 20*      BUN 16   CREATININE 0.89   ALKPHOS 86   ALT 24   AST 38*   BILITOT 0.8     LFTs:   Recent Labs   Lab 01/04/22  1033   ALT 24   AST 38*   ALKPHOS 86   BILITOT 0.8   PROT 8.2   ALBUMIN 4.3     Coagulation: No results for input(s): LABPROT, INR, APTT in the last 168 hours.  Cardiac markers: No results for input(s): CKMB, CPKMB, TROPONINT, TROPONINI, MYOGLOBIN in the last 168 hours.  ABGs: No results for input(s): PH, PCO2, PO2, HCO3, POCSATURATED, BE in the last 168 hours.    Significant Diagnostics:  I have reviewed all pertinent imaging results/findings within the past 24 hours.    Assessment/Plan:     * Small bowel obstruction  01/04/2021 admit, iv hydration, premedicate with morhpine and hurricaine spray attempt reinsertion ng tube, bowel rest, repeat kub am, serial exams    Explained to patient if unable to tolerate insertion of ng tube higher chance of requiring exploratory surgery patient is fully vaccinated      VTE Risk Mitigation (From admission, onward)         Ordered     heparin (porcine) injection 5,000 Units  Every 8 hours         01/04/22 1450     IP VTE HIGH RISK PATIENT  Once         01/04/22 1450     Place sequential compression device  Until discontinued         01/04/22 1450                LISS Townsend  General Surgery  [facility] - Emergency Department

## 2022-01-04 NOTE — ASSESSMENT & PLAN NOTE
01/04/2021 admit, iv hydration, premedicate with morhpine and hurricaine spray attempt reinsertion ng tube, bowel rest, repeat kub am, serial exams    Explained to patient if unable to tolerate insertion of ng tube higher chance of requiring exploratory surgery patient is fully vaccinated

## 2022-01-05 ENCOUNTER — ANESTHESIA (OUTPATIENT)
Dept: SURGERY | Facility: HOSPITAL | Age: 47
DRG: 337 | End: 2022-01-05
Payer: MEDICAID

## 2022-01-05 ENCOUNTER — ANESTHESIA EVENT (OUTPATIENT)
Dept: SURGERY | Facility: HOSPITAL | Age: 47
DRG: 337 | End: 2022-01-05
Payer: COMMERCIAL

## 2022-01-05 LAB
ANION GAP SERPL CALCULATED.3IONS-SCNC: 15 MMOL/L (ref 7–16)
BASOPHILS # BLD AUTO: 0.01 K/UL (ref 0–0.2)
BASOPHILS NFR BLD AUTO: 0.1 % (ref 0–1)
BUN SERPL-MCNC: 14 MG/DL (ref 7–18)
BUN/CREAT SERPL: 16 (ref 6–20)
CALCIUM SERPL-MCNC: 7.9 MG/DL (ref 8.5–10.1)
CHLORIDE SERPL-SCNC: 112 MMOL/L (ref 98–107)
CO2 SERPL-SCNC: 22 MMOL/L (ref 21–32)
CREAT SERPL-MCNC: 0.87 MG/DL (ref 0.55–1.02)
DIFFERENTIAL METHOD BLD: ABNORMAL
EOSINOPHIL # BLD AUTO: 0.09 K/UL (ref 0–0.5)
EOSINOPHIL NFR BLD AUTO: 1.1 % (ref 1–4)
ERYTHROCYTE [DISTWIDTH] IN BLOOD BY AUTOMATED COUNT: 16.5 % (ref 11.5–14.5)
GLUCOSE SERPL-MCNC: 89 MG/DL (ref 74–106)
HCT VFR BLD AUTO: 32.9 % (ref 38–47)
HGB BLD-MCNC: 10.5 G/DL (ref 12–16)
IMM GRANULOCYTES # BLD AUTO: 0.02 K/UL (ref 0–0.04)
IMM GRANULOCYTES NFR BLD: 0.2 % (ref 0–0.4)
INDIRECT COOMBS: NORMAL
LYMPHOCYTES # BLD AUTO: 1.8 K/UL (ref 1–4.8)
LYMPHOCYTES NFR BLD AUTO: 21.6 % (ref 27–41)
MCH RBC QN AUTO: 24.5 PG (ref 27–31)
MCHC RBC AUTO-ENTMCNC: 31.9 G/DL (ref 32–36)
MCV RBC AUTO: 76.9 FL (ref 80–96)
MONOCYTES # BLD AUTO: 0.68 K/UL (ref 0–0.8)
MONOCYTES NFR BLD AUTO: 8.2 % (ref 2–6)
MPC BLD CALC-MCNC: 9.9 FL (ref 9.4–12.4)
NEUTROPHILS # BLD AUTO: 5.73 K/UL (ref 1.8–7.7)
NEUTROPHILS NFR BLD AUTO: 68.8 % (ref 53–65)
NRBC # BLD AUTO: 0 X10E3/UL
NRBC, AUTO (.00): 0 %
PLATELET # BLD AUTO: 308 K/UL (ref 150–400)
POTASSIUM SERPL-SCNC: 4 MMOL/L (ref 3.5–5.1)
RBC # BLD AUTO: 4.28 M/UL (ref 4.2–5.4)
RH BLD: NORMAL
SODIUM SERPL-SCNC: 145 MMOL/L (ref 136–145)
WBC # BLD AUTO: 8.33 K/UL (ref 4.5–11)

## 2022-01-05 PROCEDURE — 85025 COMPLETE CBC W/AUTO DIFF WBC: CPT | Performed by: NURSE PRACTITIONER

## 2022-01-05 PROCEDURE — 27201423 OPTIME MED/SURG SUP & DEVICES STERILE SUPPLY: Performed by: SURGERY

## 2022-01-05 PROCEDURE — 94761 N-INVAS EAR/PLS OXIMETRY MLT: CPT

## 2022-01-05 PROCEDURE — 25000003 PHARM REV CODE 250: Performed by: NURSE PRACTITIONER

## 2022-01-05 PROCEDURE — D9220A PRA ANESTHESIA: Mod: CRNA,,, | Performed by: NURSE ANESTHETIST, CERTIFIED REGISTERED

## 2022-01-05 PROCEDURE — 25000003 PHARM REV CODE 250: Performed by: ANESTHESIOLOGY

## 2022-01-05 PROCEDURE — 80048 BASIC METABOLIC PNL TOTAL CA: CPT | Performed by: NURSE PRACTITIONER

## 2022-01-05 PROCEDURE — 37000008 HC ANESTHESIA 1ST 15 MINUTES: Performed by: SURGERY

## 2022-01-05 PROCEDURE — 36000708 HC OR TIME LEV III 1ST 15 MIN: Performed by: SURGERY

## 2022-01-05 PROCEDURE — C1729 CATH, DRAINAGE: HCPCS | Performed by: SURGERY

## 2022-01-05 PROCEDURE — 25000003 PHARM REV CODE 250: Performed by: SURGERY

## 2022-01-05 PROCEDURE — 63600175 PHARM REV CODE 636 W HCPCS: Performed by: ANESTHESIOLOGY

## 2022-01-05 PROCEDURE — D9220A PRA ANESTHESIA: ICD-10-PCS | Mod: ANES,,, | Performed by: ANESTHESIOLOGY

## 2022-01-05 PROCEDURE — 37000009 HC ANESTHESIA EA ADD 15 MINS: Performed by: SURGERY

## 2022-01-05 PROCEDURE — 44005 PR FREEING BOWEL ADHESION,ENTEROLYSIS: ICD-10-PCS | Mod: ,,, | Performed by: SURGERY

## 2022-01-05 PROCEDURE — 11000001 HC ACUTE MED/SURG PRIVATE ROOM

## 2022-01-05 PROCEDURE — 71000033 HC RECOVERY, INTIAL HOUR: Performed by: SURGERY

## 2022-01-05 PROCEDURE — 99900035 HC TECH TIME PER 15 MIN (STAT)

## 2022-01-05 PROCEDURE — 36415 COLL VENOUS BLD VENIPUNCTURE: CPT | Performed by: ANESTHESIOLOGY

## 2022-01-05 PROCEDURE — 36415 COLL VENOUS BLD VENIPUNCTURE: CPT | Performed by: NURSE PRACTITIONER

## 2022-01-05 PROCEDURE — D9220A PRA ANESTHESIA: ICD-10-PCS | Mod: CRNA,,, | Performed by: NURSE ANESTHETIST, CERTIFIED REGISTERED

## 2022-01-05 PROCEDURE — 36000709 HC OR TIME LEV III EA ADD 15 MIN: Performed by: SURGERY

## 2022-01-05 PROCEDURE — 44005 FREEING OF BOWEL ADHESION: CPT | Mod: ,,, | Performed by: SURGERY

## 2022-01-05 PROCEDURE — D9220A PRA ANESTHESIA: Mod: ANES,,, | Performed by: ANESTHESIOLOGY

## 2022-01-05 PROCEDURE — 86901 BLOOD TYPING SEROLOGIC RH(D): CPT | Performed by: SURGERY

## 2022-01-05 PROCEDURE — 63600175 PHARM REV CODE 636 W HCPCS: Performed by: NURSE PRACTITIONER

## 2022-01-05 RX ORDER — LIDOCAINE HYDROCHLORIDE 20 MG/ML
INJECTION, SOLUTION EPIDURAL; INFILTRATION; INTRACAUDAL; PERINEURAL
Status: DISCONTINUED | OUTPATIENT
Start: 2022-01-05 | End: 2022-01-05

## 2022-01-05 RX ORDER — MORPHINE SULFATE 10 MG/ML
4 INJECTION INTRAMUSCULAR; INTRAVENOUS; SUBCUTANEOUS EVERY 5 MIN PRN
Status: DISCONTINUED | OUTPATIENT
Start: 2022-01-05 | End: 2022-01-05 | Stop reason: HOSPADM

## 2022-01-05 RX ORDER — HYDROMORPHONE HYDROCHLORIDE 2 MG/ML
0.5 INJECTION, SOLUTION INTRAMUSCULAR; INTRAVENOUS; SUBCUTANEOUS EVERY 5 MIN PRN
Status: DISCONTINUED | OUTPATIENT
Start: 2022-01-05 | End: 2022-01-05 | Stop reason: HOSPADM

## 2022-01-05 RX ORDER — MIDAZOLAM HYDROCHLORIDE 1 MG/ML
INJECTION INTRAMUSCULAR; INTRAVENOUS
Status: DISCONTINUED | OUTPATIENT
Start: 2022-01-05 | End: 2022-01-05

## 2022-01-05 RX ORDER — DIPHENHYDRAMINE HYDROCHLORIDE 50 MG/ML
25 INJECTION INTRAMUSCULAR; INTRAVENOUS EVERY 6 HOURS PRN
Status: DISCONTINUED | OUTPATIENT
Start: 2022-01-05 | End: 2022-01-05 | Stop reason: HOSPADM

## 2022-01-05 RX ORDER — SODIUM CHLORIDE, SODIUM LACTATE, POTASSIUM CHLORIDE, CALCIUM CHLORIDE 600; 310; 30; 20 MG/100ML; MG/100ML; MG/100ML; MG/100ML
INJECTION, SOLUTION INTRAVENOUS CONTINUOUS
Status: DISCONTINUED | OUTPATIENT
Start: 2022-01-05 | End: 2022-01-05

## 2022-01-05 RX ORDER — LIDOCAINE HYDROCHLORIDE 10 MG/ML
1 INJECTION, SOLUTION EPIDURAL; INFILTRATION; INTRACAUDAL; PERINEURAL ONCE
Status: DISCONTINUED | OUTPATIENT
Start: 2022-01-05 | End: 2022-01-12 | Stop reason: HOSPADM

## 2022-01-05 RX ORDER — LIDOCAINE HYDROCHLORIDE 10 MG/ML
1 INJECTION, SOLUTION EPIDURAL; INFILTRATION; INTRACAUDAL; PERINEURAL ONCE AS NEEDED
Status: DISCONTINUED | OUTPATIENT
Start: 2022-01-05 | End: 2022-01-12 | Stop reason: HOSPADM

## 2022-01-05 RX ORDER — FENTANYL CITRATE 50 UG/ML
INJECTION, SOLUTION INTRAMUSCULAR; INTRAVENOUS
Status: DISCONTINUED | OUTPATIENT
Start: 2022-01-05 | End: 2022-01-05

## 2022-01-05 RX ORDER — ONDANSETRON 2 MG/ML
4 INJECTION INTRAMUSCULAR; INTRAVENOUS DAILY PRN
Status: DISCONTINUED | OUTPATIENT
Start: 2022-01-05 | End: 2022-01-05 | Stop reason: HOSPADM

## 2022-01-05 RX ORDER — BUPIVACAINE HYDROCHLORIDE 2.5 MG/ML
INJECTION, SOLUTION EPIDURAL; INFILTRATION; INTRACAUDAL
Status: DISCONTINUED | OUTPATIENT
Start: 2022-01-05 | End: 2022-01-05 | Stop reason: HOSPADM

## 2022-01-05 RX ORDER — OXYCODONE HYDROCHLORIDE 5 MG/1
5 TABLET ORAL
Status: DISCONTINUED | OUTPATIENT
Start: 2022-01-05 | End: 2022-01-05 | Stop reason: HOSPADM

## 2022-01-05 RX ORDER — SODIUM CHLORIDE, SODIUM LACTATE, POTASSIUM CHLORIDE, CALCIUM CHLORIDE 600; 310; 30; 20 MG/100ML; MG/100ML; MG/100ML; MG/100ML
125 INJECTION, SOLUTION INTRAVENOUS CONTINUOUS
Status: DISCONTINUED | OUTPATIENT
Start: 2022-01-05 | End: 2022-01-05

## 2022-01-05 RX ORDER — PROPOFOL 10 MG/ML
VIAL (ML) INTRAVENOUS
Status: DISCONTINUED | OUTPATIENT
Start: 2022-01-05 | End: 2022-01-05

## 2022-01-05 RX ORDER — ROCURONIUM BROMIDE 10 MG/ML
INJECTION, SOLUTION INTRAVENOUS
Status: DISCONTINUED | OUTPATIENT
Start: 2022-01-05 | End: 2022-01-05

## 2022-01-05 RX ORDER — MEPERIDINE HYDROCHLORIDE 25 MG/ML
25 INJECTION INTRAMUSCULAR; INTRAVENOUS; SUBCUTANEOUS EVERY 10 MIN PRN
Status: DISCONTINUED | OUTPATIENT
Start: 2022-01-05 | End: 2022-01-05 | Stop reason: HOSPADM

## 2022-01-05 RX ADMIN — FENTANYL CITRATE 100 MCG: 50 INJECTION INTRAMUSCULAR; INTRAVENOUS at 12:01

## 2022-01-05 RX ADMIN — ROCURONIUM BROMIDE 40 MG: 10 INJECTION, SOLUTION INTRAVENOUS at 11:01

## 2022-01-05 RX ADMIN — SUGAMMADEX 200 MG: 100 INJECTION, SOLUTION INTRAVENOUS at 12:01

## 2022-01-05 RX ADMIN — SODIUM CHLORIDE: 9 INJECTION, SOLUTION INTRAVENOUS at 09:01

## 2022-01-05 RX ADMIN — PROPOFOL 200 MG: 10 INJECTION, EMULSION INTRAVENOUS at 11:01

## 2022-01-05 RX ADMIN — CEFAZOLIN 2 G: 1 INJECTION, POWDER, FOR SOLUTION INTRAMUSCULAR; INTRAVENOUS; PARENTERAL at 11:01

## 2022-01-05 RX ADMIN — KETOROLAC TROMETHAMINE 15 MG: 30 INJECTION, SOLUTION INTRAMUSCULAR; INTRAVENOUS at 09:01

## 2022-01-05 RX ADMIN — KETOROLAC TROMETHAMINE 15 MG: 30 INJECTION, SOLUTION INTRAMUSCULAR; INTRAVENOUS at 04:01

## 2022-01-05 RX ADMIN — HEPARIN SODIUM 5000 UNITS: 5000 INJECTION INTRAVENOUS; SUBCUTANEOUS at 02:01

## 2022-01-05 RX ADMIN — ACETAMINOPHEN 650 MG: 325 TABLET ORAL at 04:01

## 2022-01-05 RX ADMIN — HEPARIN SODIUM 5000 UNITS: 5000 INJECTION INTRAVENOUS; SUBCUTANEOUS at 09:01

## 2022-01-05 RX ADMIN — FENTANYL CITRATE 100 MCG: 50 INJECTION INTRAMUSCULAR; INTRAVENOUS at 11:01

## 2022-01-05 RX ADMIN — MIDAZOLAM 2 MG: 1 INJECTION INTRAMUSCULAR; INTRAVENOUS at 11:01

## 2022-01-05 RX ADMIN — FAMOTIDINE 20 MG: 10 INJECTION, SOLUTION INTRAVENOUS at 09:01

## 2022-01-05 RX ADMIN — HEPARIN SODIUM 5000 UNITS: 5000 INJECTION INTRAVENOUS; SUBCUTANEOUS at 05:01

## 2022-01-05 RX ADMIN — LISINOPRIL 20 MG: 20 TABLET ORAL at 09:01

## 2022-01-05 RX ADMIN — ROCURONIUM BROMIDE 20 MG: 10 INJECTION, SOLUTION INTRAVENOUS at 11:01

## 2022-01-05 RX ADMIN — SODIUM CHLORIDE: 9 INJECTION, SOLUTION INTRAVENOUS at 10:01

## 2022-01-05 RX ADMIN — ONDANSETRON 4 MG: 2 INJECTION INTRAMUSCULAR; INTRAVENOUS at 01:01

## 2022-01-05 RX ADMIN — MORPHINE SULFATE 4 MG: 4 INJECTION, SOLUTION INTRAMUSCULAR; INTRAVENOUS at 01:01

## 2022-01-05 RX ADMIN — ONDANSETRON 4 MG: 2 INJECTION INTRAMUSCULAR; INTRAVENOUS at 04:01

## 2022-01-05 RX ADMIN — MORPHINE SULFATE 4 MG: 4 INJECTION, SOLUTION INTRAMUSCULAR; INTRAVENOUS at 06:01

## 2022-01-05 RX ADMIN — LIDOCAINE HYDROCHLORIDE 100 MG: 20 INJECTION, SOLUTION INTRAVENOUS at 11:01

## 2022-01-05 NOTE — PROGRESS NOTES
Rec'd report that Dr. Calabrese was made aware of pt consult from ER nurse and NEREIDA Moreno did assess pt in ER.

## 2022-01-05 NOTE — ANESTHESIA PREPROCEDURE EVALUATION
01/05/2022  Josefina Izquierdo is a 46 y.o., female.    Anesthesia Evaluation    I have reviewed the Patient Summary Reports.    I have reviewed the Nursing Notes. I have reviewed the NPO Status.   I have reviewed the Medications.     Review of Systems  Anesthesia Hx:  No problems with previous Anesthesia    Social:  Non-Smoker, No Alcohol Use    Hematology/Oncology:  Hematology Normal   Oncology Normal     EENT/Dental:EENT/Dental Normal   Cardiovascular:   Hypertension    Pulmonary:  Pulmonary Normal    Renal/:  Renal/ Normal     Hepatic/GI:  Hepatic/GI Normal Small bowel obstruction s/p hyst   Musculoskeletal:  Musculoskeletal Normal    Neurological:  Neurology Normal    Endocrine:  Endocrine Normal    Dermatological:  Skin Normal    Psych:  Psychiatric Normal           Physical Exam  General:  Obesity    Airway/Jaw/Neck:  Airway Findings: Mouth Opening: Normal Mallampati: II     Eyes/Ears/Nose:  Eyes/Ears/Nose Findings:     Chest/Lungs:  Chest/Lungs Findings: Clear to auscultation     Heart/Vascular:  Heart Findings: Rate: Normal  Rhythm: Regular Rhythm        Mental Status:  Mental Status Findings:  Cooperative, Alert and Oriented         Anesthesia Plan  Type of Anesthesia, risks & benefits discussed:  Anesthesia Type:  general    Patient's Preference:   Plan Factors:          Intra-op Monitoring Plan: standard ASA monitors  Intra-op Monitoring Plan Comments:   Post Op Pain Control Plan: per primary service following discharge from PACU and multimodal analgesia  Post Op Pain Control Plan Comments:     Induction:   IV  Beta Blocker:  Patient is not currently on a Beta-Blocker (No further documentation required).       Informed Consent: Patient understands risks and agrees with Anesthesia plan.  Questions answered. Anesthesia consent signed with patient.  ASA Score: 2     Day of Surgery Review of History  & Physical: I have interviewed and examined the patient. I have reviewed the patient's H&P dated:  There are no significant changes.          Ready For Surgery From Anesthesia Perspective.

## 2022-01-05 NOTE — SUBJECTIVE & OBJECTIVE
Interval History:  Had about 400 cc of fluid out of NG tube last night.  She reports that she still has abdominal cramping, and has not passed flatus or had a bowel movement since admission.  In short, she is pretty miserable.  In addition, she reports a relatively chronic problem with constipation, commencing long before the recent hysterectomy    Medications:  Continuous Infusions:   sodium chloride 0.9% 150 mL/hr at 01/04/22 2325     Scheduled Meds:   famotidine (PF)  20 mg Intravenous Q12H    heparin (porcine)  5,000 Units Subcutaneous Q8H    lisinopriL  20 mg Oral BID     PRN Meds:acetaminophen, benzocaine, ibuprofen, ketorolac, melatonin, morphine, ondansetron, prochlorperazine, sodium chloride 0.9%     Review of patient's allergies indicates:  No Known Allergies  Objective:     Vital Signs (Most Recent):  Temp: 97.7 °F (36.5 °C) (01/05/22 0701)  Pulse: 70 (01/05/22 0701)  Resp: 16 (01/05/22 0701)  BP: 137/81 (01/05/22 0701)  SpO2: 97 % (01/05/22 0701) Vital Signs (24h Range):  Temp:  [97.7 °F (36.5 °C)-98.3 °F (36.8 °C)] 97.7 °F (36.5 °C)  Pulse:  [] 70  Resp:  [16-22] 16  SpO2:  [94 %-99 %] 97 %  BP: (123-176)/() 137/81     Weight: 72.6 kg (160 lb 0.9 oz)  Body mass index is 25.83 kg/m².    Intake/Output - Last 3 Shifts       01/03 0700  01/04 0659 01/04 0700 01/05 0659 01/05 0700 01/06 0659    P.O.  0     Total Intake(mL/kg)  0 (0)     Urine (mL/kg/hr)  0     Drains  350     Total Output  350     Net  -350            Urine Occurrence  1 x           Physical Exam  Pulmonary:      Effort: Pulmonary effort is normal. No respiratory distress.   Abdominal:      Tenderness: There is abdominal tenderness.   Skin:     General: Skin is warm.   Neurological:      General: No focal deficit present.      Mental Status: She is alert.         Significant Labs:  I have reviewed all pertinent lab results within the past 24 hours.  CBC:   Recent Labs   Lab 01/05/22  0303   WBC 8.33   RBC 4.28   HGB 10.5*    HCT 32.9*      MCV 76.9*   MCH 24.5*   MCHC 31.9*     BMP:   Recent Labs   Lab 01/04/22 1033 01/04/22 1033 01/05/22  0303   *   < > 89      < > 145   K 5.4*   < > 4.0      < > 112*   CO2 20*   < > 22   BUN 16   < > 14   CREATININE 0.89   < > 0.87   CALCIUM 10.0   < > 7.9*   MG 1.9  --   --     < > = values in this interval not displayed.     CMP:   Recent Labs   Lab 01/04/22 1033 01/04/22 1033 01/05/22  0303   *   < > 89   CALCIUM 10.0   < > 7.9*   ALBUMIN 4.3  --   --    PROT 8.2  --   --       < > 145   K 5.4*   < > 4.0   CO2 20*   < > 22      < > 112*   BUN 16   < > 14   CREATININE 0.89   < > 0.87   ALKPHOS 86  --   --    ALT 24  --   --    AST 38*  --   --    BILITOT 0.8  --   --     < > = values in this interval not displayed.       Significant Diagnostics:  CT: I have reviewed all pertinent results/findings within the past 24 hours and my personal findings are:  dilated small bowel with collapsed colon and an apparent transition point in the distal ileum on the right side of the pelvis.

## 2022-01-05 NOTE — OP NOTE
[facility] - Periop Services     Operative Note    SUMMARY     Date of Procedure: 1/5/2022     Procedure: Procedure(s) (LRB):  LAPAROSCOPY, DIAGNOSTIC (N/A)     Surgeon(s) and Role:     * Alexis Calabrese MD - Primary    Assisting Surgeon: None    Pre-Operative Diagnosis: Small bowel obstruction [K56.609]    Post-Operative Diagnosis: Post-Op Diagnosis Codes:     * Small bowel obstruction [K56.609]    Anesthesia: General    Description of the Findings of the Procedure:  The patient was noted to have an omental adhesion to the pelvis.  After removing this, the distal ileum was noted to be tightly adherent to the left ovary as well as the vaginal cuff.  Laparoscopic dissection was performed, freeing up this loop of small bowel from the vaginal cuff.  There was no evidence of bowel injury.  There were also omental adhesions to the right lateral abdomen and adhesions from the ascending colon to the peritoneum.  These did not appear to be causing any obstruction and were not disturbed.    Technical Procedures Used:  The patient was taken to the operating room and placed in supine position.  General anesthesia was administered per anesthesia staff.  The abdomen was prepped and draped in standard sterile fashion.  Previous supraumbilical incision was incised with 11 blade and dissection was carried down to the level of fascia.  Fascia was sharply incised and peritoneum was entered.  Cleopatra cannula was inserted.  Additional trocars were placed, 5 mm in size, at the previous robotic port site incisions.  These ports were placed under direct visualization.  The omentum was then bluntly dissected away from the peritoneum in the pelvis.  Following this, we were able to determine that the small intestine was tightly adherent to the vaginal cuff and the left adnexa.  Slow and tedious blunt dissection was performed, removing the small intestine from this location.  This was deemed to be the point of obstruction.  The small  intestine was then wall to approximately for roughly 24-36 cm, and there was no further point of obstruction noted.  Subsequent to this, the pelvis was irrigated and the intestines were allowed to return to their anatomic position.  Irrigation was suctioned free.  CO2 pneumoperitoneum was then allowed to escape and ports were removed.  Fascia of the supraumbilical incision was reapproximated with interrupted PDS suture.  All port sites were then closed with subcuticular Vicryl.  Steri-Strips are placed sterile dressings were applied.  The patient was awakened from anesthesia in stable condition.    Assistant(s):  None    Complications: No    Estimated Blood Loss (EBL): * No values recorded between 1/5/2022 11:29 AM and 1/5/2022 12:41 PM *           Implants: * No implants in log *    Specimens:   Specimen (24h ago, onward)            None           Condition: Good      Complications:  None

## 2022-01-05 NOTE — PROGRESS NOTES
[facility] - Orthopedic  General Surgery  Progress Note    Subjective:     History of Present Illness:  46 y'o female chronic constipation previous C section hysterectomy last month per Dr. Jackson (December 8th),  reportedly had adhesions at hysterectomy unable to have a good bowel movement,  Has been several days without bowel movement, no flatus, belching yesterday abdominal cramping, bloating, n/v, ct concerning for sbo with transition point at ileum  Refusing NG tube, attempting to vomit during exam PMH:  HTN, cardiac ablation 2012 for SVT        Post-Op Info:  Procedure(s) (LRB):  LAPAROSCOPY, DIAGNOSTIC (N/A)  LAPAROTOMY, EXPLORATORY (N/A)         Interval History:  Had about 400 cc of fluid out of NG tube last night.  She reports that she still has abdominal cramping, and has not passed flatus or had a bowel movement since admission.  In short, she is pretty miserable.  In addition, she reports a relatively chronic problem with constipation, commencing long before the recent hysterectomy    Medications:  Continuous Infusions:   sodium chloride 0.9% 150 mL/hr at 01/04/22 2325     Scheduled Meds:   famotidine (PF)  20 mg Intravenous Q12H    heparin (porcine)  5,000 Units Subcutaneous Q8H    lisinopriL  20 mg Oral BID     PRN Meds:acetaminophen, benzocaine, ibuprofen, ketorolac, melatonin, morphine, ondansetron, prochlorperazine, sodium chloride 0.9%     Review of patient's allergies indicates:  No Known Allergies  Objective:     Vital Signs (Most Recent):  Temp: 97.7 °F (36.5 °C) (01/05/22 0701)  Pulse: 70 (01/05/22 0701)  Resp: 16 (01/05/22 0701)  BP: 137/81 (01/05/22 0701)  SpO2: 97 % (01/05/22 0701) Vital Signs (24h Range):  Temp:  [97.7 °F (36.5 °C)-98.3 °F (36.8 °C)] 97.7 °F (36.5 °C)  Pulse:  [] 70  Resp:  [16-22] 16  SpO2:  [94 %-99 %] 97 %  BP: (123-176)/() 137/81     Weight: 72.6 kg (160 lb 0.9 oz)  Body mass index is 25.83 kg/m².    Intake/Output - Last 3 Shifts       01/03  0700  01/04 0659 01/04 0700 01/05 0659 01/05 0700  01/06 0659    P.O.  0     Total Intake(mL/kg)  0 (0)     Urine (mL/kg/hr)  0     Drains  350     Total Output  350     Net  -350            Urine Occurrence  1 x           Physical Exam  Pulmonary:      Effort: Pulmonary effort is normal. No respiratory distress.   Abdominal:      Tenderness: There is abdominal tenderness.   Skin:     General: Skin is warm.   Neurological:      General: No focal deficit present.      Mental Status: She is alert.         Significant Labs:  I have reviewed all pertinent lab results within the past 24 hours.  CBC:   Recent Labs   Lab 01/05/22  0303   WBC 8.33   RBC 4.28   HGB 10.5*   HCT 32.9*      MCV 76.9*   MCH 24.5*   MCHC 31.9*     BMP:   Recent Labs   Lab 01/04/22 1033 01/04/22 1033 01/05/22  0303   *   < > 89      < > 145   K 5.4*   < > 4.0      < > 112*   CO2 20*   < > 22   BUN 16   < > 14   CREATININE 0.89   < > 0.87   CALCIUM 10.0   < > 7.9*   MG 1.9  --   --     < > = values in this interval not displayed.     CMP:   Recent Labs   Lab 01/04/22 1033 01/04/22 1033 01/05/22  0303   *   < > 89   CALCIUM 10.0   < > 7.9*   ALBUMIN 4.3  --   --    PROT 8.2  --   --       < > 145   K 5.4*   < > 4.0   CO2 20*   < > 22      < > 112*   BUN 16   < > 14   CREATININE 0.89   < > 0.87   ALKPHOS 86  --   --    ALT 24  --   --    AST 38*  --   --    BILITOT 0.8  --   --     < > = values in this interval not displayed.       Significant Diagnostics:  CT: I have reviewed all pertinent results/findings within the past 24 hours and my personal findings are:  dilated small bowel with collapsed colon and an apparent transition point in the distal ileum on the right side of the pelvis.    Assessment/Plan:     * Small bowel obstruction  01/04/2021 admit, iv hydration, premedicate with morhpine and hurricaine spray attempt reinsertion ng tube, bowel rest, repeat kub am, serial exams    Explained to  patient if unable to tolerate insertion of ng tube higher chance of requiring exploratory surgery patient is fully vaccinated    1/5  discussed potential for surgery, and the patient would like to proceed, as she continues to have pain, despite the nasogastric tube decompression.    Risks and benefits were discussed to include infection, bleeding, possible need to open with a lower abdominal incision, hernia, anastomotic leak if resection is required, and recurrent obstruction.  She expresses understanding wishes to proceed.        Alexis Calabrese MD  General Surgery  [facility] - Orthopedic

## 2022-01-05 NOTE — ANESTHESIA PROCEDURE NOTES
Intubation    Date/Time: 1/5/2022 11:19 AM  Performed by: Dimas Watson MD  Authorized by: Dmias Watson MD     Intubation:     Induction:  Intravenous    Intubated:  Postinduction    Mask Ventilation:  Easy mask    Attempts:  1    Attempted By:  CRNA    Method of Intubation:  Direct    Blade:  Sabrina 4    Laryngeal View Grade: Grade I - full view of cords      Difficult Airway Encountered?: No      Complications:  None    Airway Device:  Oral endotracheal tube    Airway Device Size:  7.0    Style/Cuff Inflation:  Cuffed    Inflation Amount (mL):  7    Tube secured:  21    Secured at:  The lips    Placement Verified By:  Capnometry    Complicating Factors:  None    Findings Post-Intubation:  BS equal bilateral and atraumatic/condition of teeth unchanged

## 2022-01-05 NOTE — ASSESSMENT & PLAN NOTE
01/04/2021 admit, iv hydration, premedicate with morhpine and hurricaine spray attempt reinsertion ng tube, bowel rest, repeat kub am, serial exams    Explained to patient if unable to tolerate insertion of ng tube higher chance of requiring exploratory surgery patient is fully vaccinated    1/5  discussed potential for surgery, and the patient would like to proceed, as she continues to have pain, despite the nasogastric tube decompression.    Risks and benefits were discussed to include infection, bleeding, possible need to open with a lower abdominal incision, hernia, anastomotic leak if resection is required, and recurrent obstruction.  She expresses understanding wishes to proceed.

## 2022-01-05 NOTE — NURSING
1325: pt back from surgery. amulated to bathroom with assist to void. Pt assisted back to bed. Pt dry heaving. zofran recently given in PACU. 3 island dressing noted. C/d/i. Pt states her pain is 8/10 in abd. Will follow up with pain meds.

## 2022-01-05 NOTE — TRANSFER OF CARE
"Anesthesia Transfer of Care Note    Patient: Josefina Izquierdo    Procedure(s) Performed: Procedure(s) (LRB):  LAPAROSCOPY, DIAGNOSTIC (N/A)    Patient location: Other: [facility]    Anesthesia Type: general    Transport from OR: Transported from OR on room air with adequate spontaneous ventilation    Post pain: adequate analgesia    Post assessment: no apparent anesthetic complications    Post vital signs: stable    Level of consciousness: sedated and responds to stimulation    Nausea/Vomiting: no nausea/vomiting    Complications: none    Transfer of care protocol was followedComments: Good SV continue, NAD noted, VSS, RTRN      Last vitals:   Visit Vitals  /81   Pulse 89   Temp 37 °C (98.6 °F)   Resp (!) 9   Ht 5' 6" (1.676 m)   Wt 72.6 kg (160 lb 0.9 oz)   LMP 11/22/2021   SpO2 95%   Breastfeeding No   BMI 25.83 kg/m²     "

## 2022-01-05 NOTE — OR NURSING
1242 received from OR    1312 recovery complete, return to room 465    1325 report given to jamil calhoun. 97.4; 95% room air; 140/76; 16. Family at bedside.

## 2022-01-05 NOTE — PLAN OF CARE
[facility] - Orthopedic  Initial Discharge Assessment       Primary Care Provider: Mark Dean MD    Admission Diagnosis: Abdominal pain [R10.9]  Small bowel obstruction [K56.609]    Admission Date: 1/4/2022  Expected Discharge Date:     Discharge Barriers Identified: None    Pay[place] / Plan[place] / Product Type: Government /     Extended Emergency Contact Information  Primary Emergency Contact: Abelino Angelo  Address: [facility] [place] [address] [facility]  Home Phone: [phone]  Relation: Other  Secondary Emergency Contact: Jaron Guthrie  Mobile Phone: [phone]  Relation: Son  Preferred language: English   needed? No    Discharge Plan A: Home with family  Discharge Plan B: Home      [facility] [address] [facility] [address]  [address] [facility] [address]  Phone: [phone] Fax: [phone]    [facility] [address] [place] [address] [address]  [address]  [place] [address]  Phone: [phone] Fax: [phone]    [facility] [address] [place] [address] [address]  [address] [facility] [place] [address]  Phone: [phone] Fax: [phone]    [facility] [place] - [address]  [address] [facility] [place] [address]  Phone: [phone] Fax: [phone]      Initial Assessment (most recent)     Adult Discharge Assessment - 01/05/22 1514        Discharge Assessment    Assessment Type Discharge Planning Assessment     Source of Information family     Lives With spouse     Do you expect to return to your current living situation? Yes     Do you have help at home or someone to help you manage your care at home? Yes     Who are your caregiver(s) and their phone number(s)? Abelino Angelo- Significant Other [phone]     Equipment Currently Used at Home none     Discharge Plan A Home with family     Discharge Plan B Home     DME  Needed Upon Discharge  none     Discharge Plan discussed with: Spouse/sig other     Name(s) and Number(s) Abelino Angelo- Significant Other [phone]     Discharge Barriers Identified None               SW spoke with pts significant other, Abelino. Pt lives at home with significant other, not current with hh and uses no dme. The plan is for pt to dc back home when medically ready. SW will cont mirta camacho for dc needs.

## 2022-01-06 LAB
ANION GAP SERPL CALCULATED.3IONS-SCNC: 15 MMOL/L (ref 7–16)
BASOPHILS # BLD AUTO: 0.01 K/UL (ref 0–0.2)
BASOPHILS NFR BLD AUTO: 0.2 % (ref 0–1)
BUN SERPL-MCNC: 7 MG/DL (ref 7–18)
BUN/CREAT SERPL: 12 (ref 6–20)
CALCIUM SERPL-MCNC: 7.3 MG/DL (ref 8.5–10.1)
CHLORIDE SERPL-SCNC: 111 MMOL/L (ref 98–107)
CO2 SERPL-SCNC: 21 MMOL/L (ref 21–32)
CREAT SERPL-MCNC: 0.6 MG/DL (ref 0.55–1.02)
DIFFERENTIAL METHOD BLD: ABNORMAL
EOSINOPHIL # BLD AUTO: 0.11 K/UL (ref 0–0.5)
EOSINOPHIL NFR BLD AUTO: 2.2 % (ref 1–4)
ERYTHROCYTE [DISTWIDTH] IN BLOOD BY AUTOMATED COUNT: 15.9 % (ref 11.5–14.5)
GLUCOSE SERPL-MCNC: 77 MG/DL (ref 74–106)
HCT VFR BLD AUTO: 30.3 % (ref 38–47)
HGB BLD-MCNC: 9.4 G/DL (ref 12–16)
IMM GRANULOCYTES # BLD AUTO: 0.01 K/UL (ref 0–0.04)
IMM GRANULOCYTES NFR BLD: 0.2 % (ref 0–0.4)
LYMPHOCYTES # BLD AUTO: 1.06 K/UL (ref 1–4.8)
LYMPHOCYTES NFR BLD AUTO: 20.9 % (ref 27–41)
MCH RBC QN AUTO: 24.4 PG (ref 27–31)
MCHC RBC AUTO-ENTMCNC: 31 G/DL (ref 32–36)
MCV RBC AUTO: 78.7 FL (ref 80–96)
MONOCYTES # BLD AUTO: 0.38 K/UL (ref 0–0.8)
MONOCYTES NFR BLD AUTO: 7.5 % (ref 2–6)
MPC BLD CALC-MCNC: 9.8 FL (ref 9.4–12.4)
NEUTROPHILS # BLD AUTO: 3.5 K/UL (ref 1.8–7.7)
NEUTROPHILS NFR BLD AUTO: 69 % (ref 53–65)
NRBC # BLD AUTO: 0 X10E3/UL
NRBC, AUTO (.00): 0 %
PLATELET # BLD AUTO: 241 K/UL (ref 150–400)
POTASSIUM SERPL-SCNC: 3.2 MMOL/L (ref 3.5–5.1)
RBC # BLD AUTO: 3.85 M/UL (ref 4.2–5.4)
SODIUM SERPL-SCNC: 144 MMOL/L (ref 136–145)
WBC # BLD AUTO: 5.07 K/UL (ref 4.5–11)

## 2022-01-06 PROCEDURE — 36415 COLL VENOUS BLD VENIPUNCTURE: CPT | Performed by: NURSE PRACTITIONER

## 2022-01-06 PROCEDURE — 80048 BASIC METABOLIC PNL TOTAL CA: CPT | Performed by: NURSE PRACTITIONER

## 2022-01-06 PROCEDURE — 85025 COMPLETE CBC W/AUTO DIFF WBC: CPT | Performed by: NURSE PRACTITIONER

## 2022-01-06 PROCEDURE — 25000003 PHARM REV CODE 250: Performed by: NURSE PRACTITIONER

## 2022-01-06 PROCEDURE — 63600175 PHARM REV CODE 636 W HCPCS: Performed by: NURSE PRACTITIONER

## 2022-01-06 PROCEDURE — 99900035 HC TECH TIME PER 15 MIN (STAT)

## 2022-01-06 PROCEDURE — 11000001 HC ACUTE MED/SURG PRIVATE ROOM

## 2022-01-06 RX ORDER — KETOROLAC TROMETHAMINE 15 MG/ML
15 INJECTION, SOLUTION INTRAMUSCULAR; INTRAVENOUS EVERY 6 HOURS
Status: DISPENSED | OUTPATIENT
Start: 2022-01-06 | End: 2022-01-09

## 2022-01-06 RX ORDER — DEXTROSE MONOHYDRATE, SODIUM CHLORIDE, AND POTASSIUM CHLORIDE 50; 1.49; 4.5 G/1000ML; G/1000ML; G/1000ML
INJECTION, SOLUTION INTRAVENOUS CONTINUOUS
Status: DISCONTINUED | OUTPATIENT
Start: 2022-01-06 | End: 2022-01-12

## 2022-01-06 RX ADMIN — KETOROLAC TROMETHAMINE 15 MG: 30 INJECTION, SOLUTION INTRAMUSCULAR; INTRAVENOUS at 09:01

## 2022-01-06 RX ADMIN — MORPHINE SULFATE 4 MG: 4 INJECTION, SOLUTION INTRAMUSCULAR; INTRAVENOUS at 08:01

## 2022-01-06 RX ADMIN — KETOROLAC TROMETHAMINE 15 MG: 15 INJECTION, SOLUTION INTRAMUSCULAR; INTRAVENOUS at 12:01

## 2022-01-06 RX ADMIN — ACETAMINOPHEN 650 MG: 325 TABLET ORAL at 04:01

## 2022-01-06 RX ADMIN — FAMOTIDINE 20 MG: 10 INJECTION, SOLUTION INTRAVENOUS at 08:01

## 2022-01-06 RX ADMIN — ONDANSETRON 4 MG: 2 INJECTION INTRAMUSCULAR; INTRAVENOUS at 12:01

## 2022-01-06 RX ADMIN — HEPARIN SODIUM 5000 UNITS: 5000 INJECTION INTRAVENOUS; SUBCUTANEOUS at 04:01

## 2022-01-06 RX ADMIN — KETOROLAC TROMETHAMINE 15 MG: 15 INJECTION, SOLUTION INTRAMUSCULAR; INTRAVENOUS at 07:01

## 2022-01-06 RX ADMIN — POTASSIUM CHLORIDE, DEXTROSE MONOHYDRATE AND SODIUM CHLORIDE: 150; 5; 450 INJECTION, SOLUTION INTRAVENOUS at 12:01

## 2022-01-06 RX ADMIN — MORPHINE SULFATE 4 MG: 4 INJECTION, SOLUTION INTRAMUSCULAR; INTRAVENOUS at 03:01

## 2022-01-06 RX ADMIN — SODIUM CHLORIDE: 9 INJECTION, SOLUTION INTRAVENOUS at 05:01

## 2022-01-06 RX ADMIN — LISINOPRIL 20 MG: 20 TABLET ORAL at 08:01

## 2022-01-06 RX ADMIN — HEPARIN SODIUM 5000 UNITS: 5000 INJECTION INTRAVENOUS; SUBCUTANEOUS at 05:01

## 2022-01-06 RX ADMIN — FAMOTIDINE 20 MG: 10 INJECTION, SOLUTION INTRAVENOUS at 09:01

## 2022-01-06 RX ADMIN — HEPARIN SODIUM 5000 UNITS: 5000 INJECTION INTRAVENOUS; SUBCUTANEOUS at 09:01

## 2022-01-06 RX ADMIN — POTASSIUM CHLORIDE, DEXTROSE MONOHYDRATE AND SODIUM CHLORIDE: 150; 5; 450 INJECTION, SOLUTION INTRAVENOUS at 09:01

## 2022-01-06 NOTE — PLAN OF CARE
Problem: Adult Inpatient Plan of Care  Goal: Plan of Care Review  Outcome: Ongoing, Progressing  Goal: Patient-Specific Goal (Individualized)  Outcome: Ongoing, Progressing  Goal: Absence of Hospital-Acquired Illness or Injury  Outcome: Ongoing, Progressing  Goal: Optimal Comfort and Wellbeing  Outcome: Ongoing, Progressing  Goal: Readiness for Transition of Care  Outcome: Ongoing, Progressing     Problem: Fall Injury Risk  Goal: Absence of Fall and Fall-Related Injury  Outcome: Ongoing, Progressing     Problem: Skin Injury Risk Increased  Goal: Skin Health and Integrity  Outcome: Ongoing, Progressing     Problem: Infection  Goal: Absence of Infection Signs and Symptoms  Outcome: Ongoing, Progressing

## 2022-01-06 NOTE — SUBJECTIVE & OBJECTIVE
Interval History: laparoscopy with JUANITA yesterday    Medications:  Continuous Infusions:   dextrose 5 % and 0.45 % NaCl with KCl 20 mEq       Scheduled Meds:   famotidine (PF)  20 mg Intravenous Q12H    heparin (porcine)  5,000 Units Subcutaneous Q8H    ketorolac  15 mg Intravenous Q6H    LIDOcaine (PF) 10 mg/ml (1%)  1 mL Intradermal Once    lisinopriL  20 mg Oral BID     PRN Meds:acetaminophen, benzocaine, ibuprofen, ketorolac, LIDOcaine (PF) 10 mg/ml (1%), melatonin, morphine, ondansetron, prochlorperazine, sodium chloride 0.9%     Review of patient's allergies indicates:  No Known Allergies  Objective:     Vital Signs (Most Recent):  Temp: 97.7 °F (36.5 °C) (01/06/22 0715)  Pulse: 77 (01/06/22 0715)  Resp: 18 (01/06/22 0715)  BP: 136/67 (01/06/22 0715)  SpO2: 97 % (01/06/22 0715) Vital Signs (24h Range):  Temp:  [97.6 °F (36.4 °C)-98.6 °F (37 °C)] 97.7 °F (36.5 °C)  Pulse:  [67-89] 77  Resp:  [9-20] 18  SpO2:  [91 %-100 %] 97 %  BP: (118-147)/(49-92) 136/67     Weight: 72.6 kg (160 lb 0.9 oz)  Body mass index is 25.83 kg/m².    Intake/Output - Last 3 Shifts       01/04 0700 01/05 0659 01/05 0700 01/06 0659 01/06 0700 01/07 0659    P.O. 0      I.V. (mL/kg)  50 (0.7)     Total Intake(mL/kg) 0 (0) 50 (0.7)     Urine (mL/kg/hr) 0      Drains 350  300    Total Output 350  300    Net -350 +50 -300           Urine Occurrence 1 x 1 x           Physical Exam  Vitals and nursing note reviewed.   HENT:      Head: Normocephalic.      Nose:      Comments: NG tube     Mouth/Throat:      Mouth: Mucous membranes are dry.   Eyes:      Conjunctiva/sclera: Conjunctivae normal.   Cardiovascular:      Rate and Rhythm: Tachycardia present.      Pulses: Normal pulses.   Pulmonary:      Effort: Pulmonary effort is normal.      Breath sounds: Normal breath sounds.   Abdominal:      General: There is distension.   Skin:     General: Skin is warm and dry.      Capillary Refill: Capillary refill takes less than 2 seconds.    Neurological:      General: No focal deficit present.   Psychiatric:         Mood and Affect: Mood normal.         Significant Labs:  I have reviewed all pertinent lab results within the past 24 hours.  CBC:   Recent Labs   Lab 01/05/22  0303   WBC 8.33   RBC 4.28   HGB 10.5*   HCT 32.9*      MCV 76.9*   MCH 24.5*   MCHC 31.9*     BMP:   Recent Labs   Lab 01/04/22  1033 01/04/22  1033 01/05/22  0303   *   < > 89      < > 145   K 5.4*   < > 4.0      < > 112*   CO2 20*   < > 22   BUN 16   < > 14   CREATININE 0.89   < > 0.87   CALCIUM 10.0   < > 7.9*   MG 1.9  --   --     < > = values in this interval not displayed.       Significant Diagnostics:  I have reviewed all pertinent imaging results/findings within the past 24 hours.

## 2022-01-06 NOTE — PLAN OF CARE
Problem: Adult Inpatient Plan of Care  Goal: Plan of Care Review  Outcome: Ongoing, Progressing  Goal: Absence of Hospital-Acquired Illness or Injury  Outcome: Ongoing, Progressing  Goal: Optimal Comfort and Wellbeing  Outcome: Ongoing, Not Progressing  Goal: Readiness for Transition of Care  Outcome: Ongoing, Not Progressing     Problem: Fall Injury Risk  Goal: Absence of Fall and Fall-Related Injury  Outcome: Ongoing, Progressing     Problem: Skin Injury Risk Increased  Goal: Skin Health and Integrity  Outcome: Ongoing, Progressing     Problem: Infection  Goal: Absence of Infection Signs and Symptoms  Outcome: Ongoing, Progressing

## 2022-01-06 NOTE — PROGRESS NOTES
[facility] - Orthopedic  General Surgery  Progress Note    Subjective: c/o ngt H/A, no flatus, no BM     History of Present Illness:  46 y'o female chronic constipation previous C section hysterectomy last month per Dr. Jackson (December 8th),  reportedly had adhesions at hysterectomy unable to have a good bowel movement,  Has been several days without bowel movement, no flatus, belching yesterday abdominal cramping, bloating, n/v, ct concerning for sbo with transition point at ileum  Refusing NG tube, attempting to vomit during exam PMH:  HTN, cardiac ablation 2012 for SVT        Post-Op Info:  Procedure(s) (LRB):  LAPAROSCOPY, DIAGNOSTIC (N/A)   1 Day Post-Op     Interval History: laparoscopy with JUANITA yesterday    Medications:  Continuous Infusions:   dextrose 5 % and 0.45 % NaCl with KCl 20 mEq       Scheduled Meds:   famotidine (PF)  20 mg Intravenous Q12H    heparin (porcine)  5,000 Units Subcutaneous Q8H    ketorolac  15 mg Intravenous Q6H    LIDOcaine (PF) 10 mg/ml (1%)  1 mL Intradermal Once    lisinopriL  20 mg Oral BID     PRN Meds:acetaminophen, benzocaine, ibuprofen, ketorolac, LIDOcaine (PF) 10 mg/ml (1%), melatonin, morphine, ondansetron, prochlorperazine, sodium chloride 0.9%     Review of patient's allergies indicates:  No Known Allergies  Objective:     Vital Signs (Most Recent):  Temp: 97.7 °F (36.5 °C) (01/06/22 0715)  Pulse: 77 (01/06/22 0715)  Resp: 18 (01/06/22 0715)  BP: 136/67 (01/06/22 0715)  SpO2: 97 % (01/06/22 0715) Vital Signs (24h Range):  Temp:  [97.6 °F (36.4 °C)-98.6 °F (37 °C)] 97.7 °F (36.5 °C)  Pulse:  [67-89] 77  Resp:  [9-20] 18  SpO2:  [91 %-100 %] 97 %  BP: (118-147)/(49-92) 136/67     Weight: 72.6 kg (160 lb 0.9 oz)  Body mass index is 25.83 kg/m².    Intake/Output - Last 3 Shifts       01/04 0700 01/05 0659 01/05 0700 01/06 0659 01/06 0700 01/07 0659    P.O. 0      I.V. (mL/kg)  50 (0.7)     Total Intake(mL/kg) 0 (0) 50 (0.7)     Urine (mL/kg/hr) 0      Drains  350  300    Total Output 350  300    Net -350 +50 -300           Urine Occurrence 1 x 1 x           Physical Exam  Vitals and nursing note reviewed.   HENT:      Head: Normocephalic.      Nose:      Comments: NG tube     Mouth/Throat:      Mouth: Mucous membranes are dry.   Eyes:      Conjunctiva/sclera: Conjunctivae normal.   Cardiovascular:      Rate and Rhythm: Tachycardia present.      Pulses: Normal pulses.   Pulmonary:      Effort: Pulmonary effort is normal.      Breath sounds: Normal breath sounds.   Abdominal:      General: There is distension.   Skin:     General: Skin is warm and dry.      Capillary Refill: Capillary refill takes less than 2 seconds.   Neurological:      General: No focal deficit present.   Psychiatric:         Mood and Affect: Mood normal.         Significant Labs:  I have reviewed all pertinent lab results within the past 24 hours.  CBC:   Recent Labs   Lab 01/05/22  0303   WBC 8.33   RBC 4.28   HGB 10.5*   HCT 32.9*      MCV 76.9*   MCH 24.5*   MCHC 31.9*     BMP:   Recent Labs   Lab 01/04/22  1033 01/04/22  1033 01/05/22  0303   *   < > 89      < > 145   K 5.4*   < > 4.0      < > 112*   CO2 20*   < > 22   BUN 16   < > 14   CREATININE 0.89   < > 0.87   CALCIUM 10.0   < > 7.9*   MG 1.9  --   --     < > = values in this interval not displayed.       Significant Diagnostics:  I have reviewed all pertinent imaging results/findings within the past 24 hours.    Assessment/Plan:     * Small bowel obstruction  01/04/2021 admit, iv hydration, premedicate with morhpine and hurricaine spray attempt reinsertion ng tube, bowel rest, repeat kub am, serial exams    Explained to patient if unable to tolerate insertion of ng tube higher chance of requiring exploratory surgery patient is fully vaccinated    1/5  discussed potential for surgery, and the patient would like to proceed, as she continues to have pain, despite the nasogastric tube decompression.    Risks and benefits  were discussed to include infection, bleeding, possible need to open with a lower abdominal incision, hernia, anastomotic leak if resection is required, and recurrent obstruction.  She expresses understanding wishes to proceed.    01/06/2021 s/p JUANITA laparoscopically nonbilious ngt output dc NGT, ice chips/ toradol prn headache, change ivf d51/2ns 20KCL, increase activity await ileus        Raine Pratt, ACNP  General Surgery  [facility] - [facility]opedic

## 2022-01-06 NOTE — ASSESSMENT & PLAN NOTE
01/04/2021 admit, iv hydration, premedicate with morhpine and hurricaine spray attempt reinsertion ng tube, bowel rest, repeat kub am, serial exams    Explained to patient if unable to tolerate insertion of ng tube higher chance of requiring exploratory surgery patient is fully vaccinated    1/5  discussed potential for surgery, and the patient would like to proceed, as she continues to have pain, despite the nasogastric tube decompression.    Risks and benefits were discussed to include infection, bleeding, possible need to open with a lower abdominal incision, hernia, anastomotic leak if resection is required, and recurrent obstruction.  She expresses understanding wishes to proceed.    01/06/2021 s/p JUANITA laparoscopically nonbilious ngt output dc NGT, ice chips/ toradol prn headache, change ivf d51/2ns 20KCL, increase activity await ileus

## 2022-01-07 LAB
ANION GAP SERPL CALCULATED.3IONS-SCNC: 13 MMOL/L (ref 7–16)
BASOPHILS # BLD AUTO: 0.01 K/UL (ref 0–0.2)
BASOPHILS NFR BLD AUTO: 0.2 % (ref 0–1)
BUN SERPL-MCNC: 3 MG/DL (ref 7–18)
BUN/CREAT SERPL: 5 (ref 6–20)
CALCIUM SERPL-MCNC: 7.6 MG/DL (ref 8.5–10.1)
CHLORIDE SERPL-SCNC: 108 MMOL/L (ref 98–107)
CO2 SERPL-SCNC: 22 MMOL/L (ref 21–32)
CREAT SERPL-MCNC: 0.61 MG/DL (ref 0.55–1.02)
DIFFERENTIAL METHOD BLD: ABNORMAL
EOSINOPHIL # BLD AUTO: 0.1 K/UL (ref 0–0.5)
EOSINOPHIL NFR BLD AUTO: 2.1 % (ref 1–4)
ERYTHROCYTE [DISTWIDTH] IN BLOOD BY AUTOMATED COUNT: 15.7 % (ref 11.5–14.5)
GLUCOSE SERPL-MCNC: 135 MG/DL (ref 74–106)
HCT VFR BLD AUTO: 30.3 % (ref 38–47)
HGB BLD-MCNC: 9.2 G/DL (ref 12–16)
IMM GRANULOCYTES # BLD AUTO: 0 K/UL (ref 0–0.04)
IMM GRANULOCYTES NFR BLD: 0 % (ref 0–0.4)
LYMPHOCYTES # BLD AUTO: 0.88 K/UL (ref 1–4.8)
LYMPHOCYTES NFR BLD AUTO: 18.2 % (ref 27–41)
MCH RBC QN AUTO: 24.2 PG (ref 27–31)
MCHC RBC AUTO-ENTMCNC: 30.4 G/DL (ref 32–36)
MCV RBC AUTO: 79.7 FL (ref 80–96)
MONOCYTES # BLD AUTO: 0.57 K/UL (ref 0–0.8)
MONOCYTES NFR BLD AUTO: 11.8 % (ref 2–6)
MPC BLD CALC-MCNC: 9.9 FL (ref 9.4–12.4)
NEUTROPHILS # BLD AUTO: 3.28 K/UL (ref 1.8–7.7)
NEUTROPHILS NFR BLD AUTO: 67.7 % (ref 53–65)
NRBC # BLD AUTO: 0 X10E3/UL
NRBC, AUTO (.00): 0 %
PLATELET # BLD AUTO: 239 K/UL (ref 150–400)
POTASSIUM SERPL-SCNC: 3.1 MMOL/L (ref 3.5–5.1)
RBC # BLD AUTO: 3.8 M/UL (ref 4.2–5.4)
SODIUM SERPL-SCNC: 140 MMOL/L (ref 136–145)
WBC # BLD AUTO: 4.84 K/UL (ref 4.5–11)

## 2022-01-07 PROCEDURE — 36415 COLL VENOUS BLD VENIPUNCTURE: CPT | Performed by: NURSE PRACTITIONER

## 2022-01-07 PROCEDURE — 25000003 PHARM REV CODE 250: Performed by: NURSE PRACTITIONER

## 2022-01-07 PROCEDURE — 94761 N-INVAS EAR/PLS OXIMETRY MLT: CPT

## 2022-01-07 PROCEDURE — 80048 BASIC METABOLIC PNL TOTAL CA: CPT | Performed by: NURSE PRACTITIONER

## 2022-01-07 PROCEDURE — 63600175 PHARM REV CODE 636 W HCPCS: Performed by: NURSE PRACTITIONER

## 2022-01-07 PROCEDURE — 99900035 HC TECH TIME PER 15 MIN (STAT)

## 2022-01-07 PROCEDURE — 85025 COMPLETE CBC W/AUTO DIFF WBC: CPT | Performed by: NURSE PRACTITIONER

## 2022-01-07 PROCEDURE — 11000001 HC ACUTE MED/SURG PRIVATE ROOM

## 2022-01-07 RX ORDER — HYDROCODONE BITARTRATE AND ACETAMINOPHEN 10; 325 MG/1; MG/1
1 TABLET ORAL EVERY 4 HOURS PRN
Status: DISCONTINUED | OUTPATIENT
Start: 2022-01-07 | End: 2022-01-12 | Stop reason: HOSPADM

## 2022-01-07 RX ADMIN — FAMOTIDINE 20 MG: 10 INJECTION, SOLUTION INTRAVENOUS at 08:01

## 2022-01-07 RX ADMIN — HEPARIN SODIUM 5000 UNITS: 5000 INJECTION INTRAVENOUS; SUBCUTANEOUS at 06:01

## 2022-01-07 RX ADMIN — LISINOPRIL 20 MG: 20 TABLET ORAL at 09:01

## 2022-01-07 RX ADMIN — KETOROLAC TROMETHAMINE 15 MG: 15 INJECTION, SOLUTION INTRAMUSCULAR; INTRAVENOUS at 01:01

## 2022-01-07 RX ADMIN — FAMOTIDINE 20 MG: 10 INJECTION, SOLUTION INTRAVENOUS at 09:01

## 2022-01-07 RX ADMIN — KETOROLAC TROMETHAMINE 15 MG: 15 INJECTION, SOLUTION INTRAMUSCULAR; INTRAVENOUS at 06:01

## 2022-01-07 RX ADMIN — MORPHINE SULFATE 4 MG: 4 INJECTION, SOLUTION INTRAMUSCULAR; INTRAVENOUS at 02:01

## 2022-01-07 RX ADMIN — HYDROCODONE BITARTRATE AND ACETAMINOPHEN 1 TABLET: 10; 325 TABLET ORAL at 04:01

## 2022-01-07 RX ADMIN — POTASSIUM CHLORIDE, DEXTROSE MONOHYDRATE AND SODIUM CHLORIDE: 150; 5; 450 INJECTION, SOLUTION INTRAVENOUS at 08:01

## 2022-01-07 RX ADMIN — HEPARIN SODIUM 5000 UNITS: 5000 INJECTION INTRAVENOUS; SUBCUTANEOUS at 10:01

## 2022-01-07 RX ADMIN — KETOROLAC TROMETHAMINE 15 MG: 15 INJECTION, SOLUTION INTRAMUSCULAR; INTRAVENOUS at 07:01

## 2022-01-07 RX ADMIN — POTASSIUM CHLORIDE, DEXTROSE MONOHYDRATE AND SODIUM CHLORIDE: 150; 5; 450 INJECTION, SOLUTION INTRAVENOUS at 04:01

## 2022-01-07 RX ADMIN — PROCHLORPERAZINE EDISYLATE 5 MG: 5 INJECTION INTRAMUSCULAR; INTRAVENOUS at 04:01

## 2022-01-07 RX ADMIN — LISINOPRIL 20 MG: 20 TABLET ORAL at 08:01

## 2022-01-07 RX ADMIN — HYDROCODONE BITARTRATE AND ACETAMINOPHEN 1 TABLET: 10; 325 TABLET ORAL at 10:01

## 2022-01-07 RX ADMIN — HEPARIN SODIUM 5000 UNITS: 5000 INJECTION INTRAVENOUS; SUBCUTANEOUS at 02:01

## 2022-01-07 NOTE — PROGRESS NOTES
[facility] - Orthopedic  General Surgery  Progress Note    Subjective: increased abd pain last night better this am     History of Present Illness:  46 y'o female chronic constipation previous C section hysterectomy last month per Dr. Jackson (December 8th),  reportedly had adhesions at hysterectomy unable to have a good bowel movement,  Has been several days without bowel movement, no flatus, belching yesterday abdominal cramping, bloating, n/v, ct concerning for sbo with transition point at ileum  Refusing NG tube, attempting to vomit during exam PMH:  HTN, cardiac ablation 2012 for SVT        Post-Op Info:  Procedure(s) (LRB):  LAPAROSCOPY, DIAGNOSTIC (N/A)   2 Days Post-Op     Interval History: +flatus, no n/v with NGT out    Medications:  Continuous Infusions:   dextrose 5 % and 0.45 % NaCl with KCl 20 mEq 125 mL/hr at 01/07/22 0825     Scheduled Meds:   famotidine (PF)  20 mg Intravenous Q12H    heparin (porcine)  5,000 Units Subcutaneous Q8H    ketorolac  15 mg Intravenous Q6H    LIDOcaine (PF) 10 mg/ml (1%)  1 mL Intradermal Once    lisinopriL  20 mg Oral BID     PRN Meds:acetaminophen, benzocaine, HYDROcodone-acetaminophen, ibuprofen, ketorolac, LIDOcaine (PF) 10 mg/ml (1%), melatonin, morphine, ondansetron, prochlorperazine, sodium chloride 0.9%     Review of patient's allergies indicates:  No Known Allergies  Objective:     Vital Signs (Most Recent):  Temp: 99 °F (37.2 °C) (01/07/22 0400)  Pulse: 72 (01/07/22 0802)  Resp: 18 (01/07/22 0802)  BP: (!) 145/90 (01/07/22 0825)  SpO2: 99 % (01/07/22 0802) Vital Signs (24h Range):  Temp:  [97.4 °F (36.3 °C)-99 °F (37.2 °C)] 99 °F (37.2 °C)  Pulse:  [63-77] 72  Resp:  [16-19] 18  SpO2:  [95 %-100 %] 99 %  BP: (132-186)/() 145/90     Weight: 72.6 kg (160 lb 0.9 oz)  Body mass index is 25.83 kg/m².    Intake/Output - Last 3 Shifts       01/05 0700 01/06 0659 01/06 0700 01/07 0659 01/07 0700 01/08 0659    P.O.  120     I.V. (mL/kg) 50 (0.7)       Total Intake(mL/kg) 50 (0.7) 120 (1.7)     Urine (mL/kg/hr)  1000 (0.6)     Drains  300     Total Output  1300     Net +50 -1180            Urine Occurrence 1 x            Physical Exam  Vitals and nursing note reviewed.   HENT:      Head: Normocephalic.      Mouth/Throat:      Mouth: Mucous membranes are dry.   Eyes:      Conjunctiva/sclera: Conjunctivae normal.   Cardiovascular:      Rate and Rhythm: Tachycardia present.      Pulses: Normal pulses.   Pulmonary:      Effort: Pulmonary effort is normal.      Breath sounds: Normal breath sounds.   Abdominal:      Palpations: Abdomen is soft.      Comments: abd dsgs c/d/i   Skin:     General: Skin is warm and dry.      Capillary Refill: Capillary refill takes less than 2 seconds.   Neurological:      General: No focal deficit present.   Psychiatric:         Mood and Affect: Mood normal.         Significant Labs:  I have reviewed all pertinent lab results within the past 24 hours.  CBC:   Recent Labs   Lab 01/06/22  1136   WBC 5.07   RBC 3.85*   HGB 9.4*   HCT 30.3*      MCV 78.7*   MCH 24.4*   MCHC 31.0*     BMP:   Recent Labs   Lab 01/04/22  1033 01/05/22  0303 01/06/22  1136   *   < > 77      < > 144   K 5.4*   < > 3.2*      < > 111*   CO2 20*   < > 21   BUN 16   < > 7   CREATININE 0.89   < > 0.60   CALCIUM 10.0   < > 7.3*   MG 1.9  --   --     < > = values in this interval not displayed.       Significant Diagnostics:  I have reviewed all pertinent imaging results/findings within the past 24 hours.    Assessment/Plan:     * Small bowel obstruction  01/04/2021 admit, iv hydration, premedicate with morhpine and hurricaine spray attempt reinsertion ng tube, bowel rest, repeat kub am, serial exams    Explained to patient if unable to tolerate insertion of ng tube higher chance of requiring exploratory surgery patient is fully vaccinated    1/5  discussed potential for surgery, and the patient would like to proceed, as she continues to have  pain, despite the nasogastric tube decompression.    Risks and benefits were discussed to include infection, bleeding, possible need to open with a lower abdominal incision, hernia, anastomotic leak if resection is required, and recurrent obstruction.  She expresses understanding wishes to proceed.    01/06/2021 s/p JUANITA laparoscopically nonbilious ngt output dc NGT, ice chips/ toradol prn headache, change ivf d51/2ns 20KCL, increase activity await ileus    01/07/2021 cl liquids as tolerate, ambulate, enc I/S        Raine Pratt, CHIKISP  General Surgery  [facility] [place] - Orthopedic

## 2022-01-07 NOTE — SUBJECTIVE & OBJECTIVE
Interval History: +flatus, no n/v with NGT out    Medications:  Continuous Infusions:   dextrose 5 % and 0.45 % NaCl with KCl 20 mEq 125 mL/hr at 01/07/22 0825     Scheduled Meds:   famotidine (PF)  20 mg Intravenous Q12H    heparin (porcine)  5,000 Units Subcutaneous Q8H    ketorolac  15 mg Intravenous Q6H    LIDOcaine (PF) 10 mg/ml (1%)  1 mL Intradermal Once    lisinopriL  20 mg Oral BID     PRN Meds:acetaminophen, benzocaine, HYDROcodone-acetaminophen, ibuprofen, ketorolac, LIDOcaine (PF) 10 mg/ml (1%), melatonin, morphine, ondansetron, prochlorperazine, sodium chloride 0.9%     Review of patient's allergies indicates:  No Known Allergies  Objective:     Vital Signs (Most Recent):  Temp: 99 °F (37.2 °C) (01/07/22 0400)  Pulse: 72 (01/07/22 0802)  Resp: 18 (01/07/22 0802)  BP: (!) 145/90 (01/07/22 0825)  SpO2: 99 % (01/07/22 0802) Vital Signs (24h Range):  Temp:  [97.4 °F (36.3 °C)-99 °F (37.2 °C)] 99 °F (37.2 °C)  Pulse:  [63-77] 72  Resp:  [16-19] 18  SpO2:  [95 %-100 %] 99 %  BP: (132-186)/() 145/90     Weight: 72.6 kg (160 lb 0.9 oz)  Body mass index is 25.83 kg/m².    Intake/Output - Last 3 Shifts       01/05 0700  01/06 0659 01/06 0700 01/07 0659 01/07 0700 01/08 0659    P.O.  120     I.V. (mL/kg) 50 (0.7)      Total Intake(mL/kg) 50 (0.7) 120 (1.7)     Urine (mL/kg/hr)  1000 (0.6)     Drains  300     Total Output  1300     Net +50 -1180            Urine Occurrence 1 x            Physical Exam  Vitals and nursing note reviewed.   HENT:      Head: Normocephalic.      Mouth/Throat:      Mouth: Mucous membranes are dry.   Eyes:      Conjunctiva/sclera: Conjunctivae normal.   Cardiovascular:      Rate and Rhythm: Tachycardia present.      Pulses: Normal pulses.   Pulmonary:      Effort: Pulmonary effort is normal.      Breath sounds: Normal breath sounds.   Abdominal:      Palpations: Abdomen is soft.      Comments: abd dsgs c/d/i   Skin:     General: Skin is warm and dry.      Capillary Refill:  Capillary refill takes less than 2 seconds.   Neurological:      General: No focal deficit present.   Psychiatric:         Mood and Affect: Mood normal.         Significant Labs:  I have reviewed all pertinent lab results within the past 24 hours.  CBC:   Recent Labs   Lab 01/06/22  1136   WBC 5.07   RBC 3.85*   HGB 9.4*   HCT 30.3*      MCV 78.7*   MCH 24.4*   MCHC 31.0*     BMP:   Recent Labs   Lab 01/04/22  1033 01/05/22  0303 01/06/22  1136   *   < > 77      < > 144   K 5.4*   < > 3.2*      < > 111*   CO2 20*   < > 21   BUN 16   < > 7   CREATININE 0.89   < > 0.60   CALCIUM 10.0   < > 7.3*   MG 1.9  --   --     < > = values in this interval not displayed.       Significant Diagnostics:  I have reviewed all pertinent imaging results/findings within the past 24 hours.

## 2022-01-07 NOTE — PLAN OF CARE
Problem: Adult Inpatient Plan of Care  Goal: Plan of Care Review  Outcome: Ongoing, Progressing     Problem: Adult Inpatient Plan of Care  Goal: Plan of Care Review  Outcome: Ongoing, Progressing     Problem: Adult Inpatient Plan of Care  Goal: Absence of Hospital-Acquired Illness or Injury  Outcome: Ongoing, Progressing     Problem: Fall Injury Risk  Goal: Absence of Fall and Fall-Related Injury  Outcome: Ongoing, Progressing     Problem: Skin Injury Risk Increased  Goal: Skin Health and Integrity  Outcome: Ongoing, Progressing     Problem: Infection  Goal: Absence of Infection Signs and Symptoms  Outcome: Ongoing, Progressing

## 2022-01-07 NOTE — ASSESSMENT & PLAN NOTE
01/04/2021 admit, iv hydration, premedicate with morhpine and hurricaine spray attempt reinsertion ng tube, bowel rest, repeat kub am, serial exams    Explained to patient if unable to tolerate insertion of ng tube higher chance of requiring exploratory surgery patient is fully vaccinated    1/5  discussed potential for surgery, and the patient would like to proceed, as she continues to have pain, despite the nasogastric tube decompression.    Risks and benefits were discussed to include infection, bleeding, possible need to open with a lower abdominal incision, hernia, anastomotic leak if resection is required, and recurrent obstruction.  She expresses understanding wishes to proceed.    01/06/2021 s/p JUANITA laparoscopically nonbilious ngt output dc NGT, ice chips/ toradol prn headache, change ivf d51/2ns 20KCL, increase activity await ileus    01/07/2021 cl liquids as tolerate, ambulate, enc I/S

## 2022-01-07 NOTE — PLAN OF CARE
Chart reviewed, cont clear liquids as tolerated. Pt encouraged to ambulate. SW will cont to follow for dc needs.

## 2022-01-08 LAB
ANION GAP SERPL CALCULATED.3IONS-SCNC: 13 MMOL/L (ref 7–16)
ANISOCYTOSIS BLD QL SMEAR: ABNORMAL
BASOPHILS # BLD AUTO: 0 K/UL (ref 0–0.2)
BASOPHILS NFR BLD AUTO: 0 % (ref 0–1)
BUN SERPL-MCNC: 5 MG/DL (ref 7–18)
BUN/CREAT SERPL: 8 (ref 6–20)
CALCIUM SERPL-MCNC: 7.7 MG/DL (ref 8.5–10.1)
CHLORIDE SERPL-SCNC: 108 MMOL/L (ref 98–107)
CO2 SERPL-SCNC: 24 MMOL/L (ref 21–32)
CREAT SERPL-MCNC: 0.61 MG/DL (ref 0.55–1.02)
DIFFERENTIAL METHOD BLD: ABNORMAL
EOSINOPHIL # BLD AUTO: 0.14 K/UL (ref 0–0.5)
EOSINOPHIL NFR BLD AUTO: 3.8 % (ref 1–4)
EOSINOPHIL NFR BLD MANUAL: 3 % (ref 1–4)
ERYTHROCYTE [DISTWIDTH] IN BLOOD BY AUTOMATED COUNT: 15.9 % (ref 11.5–14.5)
GLUCOSE SERPL-MCNC: 104 MG/DL (ref 74–106)
HCT VFR BLD AUTO: 28.8 % (ref 38–47)
HGB BLD-MCNC: 9 G/DL (ref 12–16)
HYPOCHROMIA BLD QL SMEAR: ABNORMAL
IMM GRANULOCYTES # BLD AUTO: 0.01 K/UL (ref 0–0.04)
IMM GRANULOCYTES NFR BLD: 0.3 % (ref 0–0.4)
LYMPHOCYTES # BLD AUTO: 0.73 K/UL (ref 1–4.8)
LYMPHOCYTES NFR BLD AUTO: 19.6 % (ref 27–41)
LYMPHOCYTES NFR BLD MANUAL: 28 % (ref 27–41)
MCH RBC QN AUTO: 24.2 PG (ref 27–31)
MCHC RBC AUTO-ENTMCNC: 31.3 G/DL (ref 32–36)
MCV RBC AUTO: 77.4 FL (ref 80–96)
MICROCYTES BLD QL SMEAR: ABNORMAL
MONOCYTES # BLD AUTO: 0.45 K/UL (ref 0–0.8)
MONOCYTES NFR BLD AUTO: 12.1 % (ref 2–6)
MONOCYTES NFR BLD MANUAL: 8 % (ref 2–6)
MPC BLD CALC-MCNC: 9.8 FL (ref 9.4–12.4)
NEUTROPHILS # BLD AUTO: 2.4 K/UL (ref 1.8–7.7)
NEUTROPHILS NFR BLD AUTO: 64.2 % (ref 53–65)
NEUTS BAND NFR BLD MANUAL: 12 % (ref 1–5)
NEUTS SEG NFR BLD MANUAL: 49 % (ref 50–62)
NRBC # BLD AUTO: 0 X10E3/UL
NRBC, AUTO (.00): 0 %
OVALOCYTES BLD QL SMEAR: ABNORMAL
PLATELET # BLD AUTO: 228 K/UL (ref 150–400)
PLATELET MORPHOLOGY: ABNORMAL
POLYCHROMASIA BLD QL SMEAR: ABNORMAL
POTASSIUM SERPL-SCNC: 3.7 MMOL/L (ref 3.5–5.1)
RBC # BLD AUTO: 3.72 M/UL (ref 4.2–5.4)
SODIUM SERPL-SCNC: 141 MMOL/L (ref 136–145)
TARGETS BLD QL SMEAR: ABNORMAL
WBC # BLD AUTO: 3.73 K/UL (ref 4.5–11)

## 2022-01-08 PROCEDURE — 25000003 PHARM REV CODE 250: Performed by: SURGERY

## 2022-01-08 PROCEDURE — 36415 COLL VENOUS BLD VENIPUNCTURE: CPT | Performed by: NURSE PRACTITIONER

## 2022-01-08 PROCEDURE — 85025 COMPLETE CBC W/AUTO DIFF WBC: CPT | Performed by: NURSE PRACTITIONER

## 2022-01-08 PROCEDURE — 99900035 HC TECH TIME PER 15 MIN (STAT)

## 2022-01-08 PROCEDURE — 25000003 PHARM REV CODE 250: Performed by: NURSE PRACTITIONER

## 2022-01-08 PROCEDURE — 11000001 HC ACUTE MED/SURG PRIVATE ROOM

## 2022-01-08 PROCEDURE — 63600175 PHARM REV CODE 636 W HCPCS: Performed by: NURSE PRACTITIONER

## 2022-01-08 PROCEDURE — 94761 N-INVAS EAR/PLS OXIMETRY MLT: CPT

## 2022-01-08 PROCEDURE — 80048 BASIC METABOLIC PNL TOTAL CA: CPT | Performed by: NURSE PRACTITIONER

## 2022-01-08 RX ORDER — SIMETHICONE 80 MG
1 TABLET,CHEWABLE ORAL 3 TIMES DAILY PRN
Status: DISCONTINUED | OUTPATIENT
Start: 2022-01-08 | End: 2022-01-12 | Stop reason: HOSPADM

## 2022-01-08 RX ORDER — PANTOPRAZOLE SODIUM 40 MG/1
40 TABLET, DELAYED RELEASE ORAL DAILY
Status: DISCONTINUED | OUTPATIENT
Start: 2022-01-08 | End: 2022-01-12 | Stop reason: HOSPADM

## 2022-01-08 RX ADMIN — KETOROLAC TROMETHAMINE 15 MG: 15 INJECTION, SOLUTION INTRAMUSCULAR; INTRAVENOUS at 06:01

## 2022-01-08 RX ADMIN — FAMOTIDINE 20 MG: 10 INJECTION, SOLUTION INTRAVENOUS at 09:01

## 2022-01-08 RX ADMIN — KETOROLAC TROMETHAMINE 15 MG: 15 INJECTION, SOLUTION INTRAMUSCULAR; INTRAVENOUS at 01:01

## 2022-01-08 RX ADMIN — KETOROLAC TROMETHAMINE 15 MG: 15 INJECTION, SOLUTION INTRAMUSCULAR; INTRAVENOUS at 12:01

## 2022-01-08 RX ADMIN — LISINOPRIL 20 MG: 20 TABLET ORAL at 09:01

## 2022-01-08 RX ADMIN — ONDANSETRON 4 MG: 2 INJECTION INTRAMUSCULAR; INTRAVENOUS at 09:01

## 2022-01-08 RX ADMIN — HYDROCODONE BITARTRATE AND ACETAMINOPHEN 1 TABLET: 10; 325 TABLET ORAL at 09:01

## 2022-01-08 RX ADMIN — SIMETHICONE 80 MG: 80 TABLET, CHEWABLE ORAL at 09:01

## 2022-01-08 RX ADMIN — MORPHINE SULFATE 4 MG: 4 INJECTION, SOLUTION INTRAMUSCULAR; INTRAVENOUS at 09:01

## 2022-01-08 RX ADMIN — POTASSIUM CHLORIDE, DEXTROSE MONOHYDRATE AND SODIUM CHLORIDE: 150; 5; 450 INJECTION, SOLUTION INTRAVENOUS at 04:01

## 2022-01-08 RX ADMIN — ONDANSETRON 4 MG: 2 INJECTION INTRAMUSCULAR; INTRAVENOUS at 10:01

## 2022-01-08 RX ADMIN — HEPARIN SODIUM 5000 UNITS: 5000 INJECTION INTRAVENOUS; SUBCUTANEOUS at 09:01

## 2022-01-08 RX ADMIN — POTASSIUM CHLORIDE, DEXTROSE MONOHYDRATE AND SODIUM CHLORIDE: 150; 5; 450 INJECTION, SOLUTION INTRAVENOUS at 02:01

## 2022-01-08 RX ADMIN — HEPARIN SODIUM 5000 UNITS: 5000 INJECTION INTRAVENOUS; SUBCUTANEOUS at 02:01

## 2022-01-08 RX ADMIN — PROCHLORPERAZINE EDISYLATE 5 MG: 5 INJECTION INTRAMUSCULAR; INTRAVENOUS at 12:01

## 2022-01-08 RX ADMIN — HEPARIN SODIUM 5000 UNITS: 5000 INJECTION INTRAVENOUS; SUBCUTANEOUS at 06:01

## 2022-01-08 NOTE — NURSING
wascalled to room by spouse, patient in bathroom vomiting green liquid that smells like feces after eating half of a otf craker, dr rothman notified, instructed to give compazine, start a npo diet, and dr rothman will put in further orders

## 2022-01-08 NOTE — PLAN OF CARE
Problem: Adult Inpatient Plan of Care  Goal: Plan of Care Review  Outcome: Ongoing, Progressing  Goal: Patient-Specific Goal (Individualized)  Outcome: Ongoing, Progressing  Goal: Absence of Hospital-Acquired Illness or Injury  Outcome: Ongoing, Progressing  Goal: Optimal Comfort and Wellbeing  Outcome: Ongoing, Progressing  Goal: Readiness for Transition of Care  Outcome: Ongoing, Progressing     Problem: Fall Injury Risk  Goal: Absence of Fall and Fall-Related Injury  Outcome: Ongoing, Progressing     Problem: Skin Injury Risk Increased  Goal: Skin Health and Integrity  Outcome: Ongoing, Progressing

## 2022-01-08 NOTE — PROGRESS NOTES
[facility] - Orthopedic  General Surgery  Progress Note    Subjective:     History of Present Illness:  46 y'o female chronic constipation previous C section hysterectomy last month per Dr. Jackson (December 8th),  reportedly had adhesions at hysterectomy unable to have a good bowel movement,  Has been several days without bowel movement, no flatus, belching yesterday abdominal cramping, bloating, n/v, ct concerning for sbo with transition point at ileum  Refusing NG tube, attempting to vomit during exam PMH:  HTN, cardiac ablation 2012 for SVT        Post-Op Info:  Procedure(s) (LRB):  LAPAROSCOPY, DIAGNOSTIC (N/A)   3 Days Post-Op     Interval History:  Had 2 BM this morning, but still with reflux/nausea while attempting to eat jello; still with gas pains    Medications:  Continuous Infusions:   dextrose 5 % and 0.45 % NaCl with KCl 20 mEq 125 mL/hr at 01/08/22 0421     Scheduled Meds:   famotidine (PF)  20 mg Intravenous Q12H    heparin (porcine)  5,000 Units Subcutaneous Q8H    ketorolac  15 mg Intravenous Q6H    LIDOcaine (PF) 10 mg/ml (1%)  1 mL Intradermal Once    lisinopriL  20 mg Oral BID     PRN Meds:acetaminophen, benzocaine, HYDROcodone-acetaminophen, ibuprofen, LIDOcaine (PF) 10 mg/ml (1%), melatonin, morphine, ondansetron, prochlorperazine, sodium chloride 0.9%     Review of patient's allergies indicates:  No Known Allergies  Objective:     Vital Signs (Most Recent):  Temp: 98.1 °F (36.7 °C) (01/08/22 0300)  Pulse: 75 (01/08/22 0300)  Resp: 18 (01/08/22 0904)  BP: (!) 153/95 (01/08/22 0300)  SpO2: 97 % (01/08/22 0300) Vital Signs (24h Range):  Temp:  [98.1 °F (36.7 °C)-98.7 °F (37.1 °C)] 98.1 °F (36.7 °C)  Pulse:  [70-86] 75  Resp:  [16-18] 18  SpO2:  [97 %-98 %] 97 %  BP: (116-163)/(74-98) 153/95     Weight: 72.6 kg (160 lb 0.9 oz)  Body mass index is 25.83 kg/m².    Intake/Output - Last 3 Shifts       01/06 0700 01/07 0659 01/07 0700 01/08 0659 01/08 0700 01/09 0659    P.O. 120      I.V.  (mL/kg)  0 (0)     Total Intake(mL/kg) 120 (1.7) 0 (0)     Urine (mL/kg/hr) 1000 (0.6)      Drains 300      Total Output 1300      Net -1180 0            Urine Occurrence  1 x 1 x    Stool Occurrence   1 x          Physical Exam  Cardiovascular:      Rate and Rhythm: Normal rate.   Pulmonary:      Effort: Pulmonary effort is normal.   Abdominal:      Palpations: Abdomen is soft.   Neurological:      General: No focal deficit present.      Mental Status: She is alert.         Significant Labs:  CBC:   Recent Labs   Lab 01/08/22  0340   WBC 3.73*   RBC 3.72*   HGB 9.0*   HCT 28.8*      MCV 77.4*   MCH 24.2*   MCHC 31.3*     BMP:   Recent Labs   Lab 01/04/22  1033 01/05/22  0303 01/08/22  0340   *   < > 104      < > 141   K 5.4*   < > 3.7      < > 108*   CO2 20*   < > 24   BUN 16   < > 5*   CREATININE 0.89   < > 0.61   CALCIUM 10.0   < > 7.7*   MG 1.9  --   --     < > = values in this interval not displayed.     CMP:   Recent Labs   Lab 01/04/22 1033 01/05/22  0303 01/08/22  0340   *   < > 104   CALCIUM 10.0   < > 7.7*   ALBUMIN 4.3  --   --    PROT 8.2  --   --       < > 141   K 5.4*   < > 3.7   CO2 20*   < > 24      < > 108*   BUN 16   < > 5*   CREATININE 0.89   < > 0.61   ALKPHOS 86  --   --    ALT 24  --   --    AST 38*  --   --    BILITOT 0.8  --   --     < > = values in this interval not displayed.           Assessment/Plan:     * Small bowel obstruction  01/04/2021 admit, iv hydration, premedicate with morhpine and hurricaine spray attempt reinsertion ng tube, bowel rest, repeat kub am, serial exams    Explained to patient if unable to tolerate insertion of ng tube higher chance of requiring exploratory surgery patient is fully vaccinated    1/5  discussed potential for surgery, and the patient would like to proceed, as she continues to have pain, despite the nasogastric tube decompression.    Risks and benefits were discussed to include infection, bleeding, possible  need to open with a lower abdominal incision, hernia, anastomotic leak if resection is required, and recurrent obstruction.  She expresses understanding wishes to proceed.    01/06/2021 s/p JUANITA laparoscopically nonbilious ngt output dc NGT, ice chips/ toradol prn headache, change ivf d51/2ns 20KCL, increase activity await ileus    01/07/2021 cl liquids as tolerate, ambulate, enc I/S    1/8  Bowel function has returned, but still c/o nausea.  Patient wishes to try solid food;  Add simethicone for gas pain.  D/c later today, if dramatically improved        Alexis Calabrese MD  General Surgery  [facility] - Orthopedic

## 2022-01-08 NOTE — SUBJECTIVE & OBJECTIVE
Interval History:  Had 2 BM this morning, but still with reflux/nausea while attempting to eat jello; still with gas pains    Medications:  Continuous Infusions:   dextrose 5 % and 0.45 % NaCl with KCl 20 mEq 125 mL/hr at 01/08/22 0421     Scheduled Meds:   famotidine (PF)  20 mg Intravenous Q12H    heparin (porcine)  5,000 Units Subcutaneous Q8H    ketorolac  15 mg Intravenous Q6H    LIDOcaine (PF) 10 mg/ml (1%)  1 mL Intradermal Once    lisinopriL  20 mg Oral BID     PRN Meds:acetaminophen, benzocaine, HYDROcodone-acetaminophen, ibuprofen, LIDOcaine (PF) 10 mg/ml (1%), melatonin, morphine, ondansetron, prochlorperazine, sodium chloride 0.9%     Review of patient's allergies indicates:  No Known Allergies  Objective:     Vital Signs (Most Recent):  Temp: 98.1 °F (36.7 °C) (01/08/22 0300)  Pulse: 75 (01/08/22 0300)  Resp: 18 (01/08/22 0904)  BP: (!) 153/95 (01/08/22 0300)  SpO2: 97 % (01/08/22 0300) Vital Signs (24h Range):  Temp:  [98.1 °F (36.7 °C)-98.7 °F (37.1 °C)] 98.1 °F (36.7 °C)  Pulse:  [70-86] 75  Resp:  [16-18] 18  SpO2:  [97 %-98 %] 97 %  BP: (116-163)/(74-98) 153/95     Weight: 72.6 kg (160 lb 0.9 oz)  Body mass index is 25.83 kg/m².    Intake/Output - Last 3 Shifts       01/06 0700  01/07 0659 01/07 0700 01/08 0659 01/08 0700  01/09 0659    P.O. 120      I.V. (mL/kg)  0 (0)     Total Intake(mL/kg) 120 (1.7) 0 (0)     Urine (mL/kg/hr) 1000 (0.6)      Drains 300      Total Output 1300      Net -1180 0            Urine Occurrence  1 x 1 x    Stool Occurrence   1 x          Physical Exam  Cardiovascular:      Rate and Rhythm: Normal rate.   Pulmonary:      Effort: Pulmonary effort is normal.   Abdominal:      Palpations: Abdomen is soft.   Neurological:      General: No focal deficit present.      Mental Status: She is alert.         Significant Labs:  CBC:   Recent Labs   Lab 01/08/22  0340   WBC 3.73*   RBC 3.72*   HGB 9.0*   HCT 28.8*      MCV 77.4*   MCH 24.2*   MCHC 31.3*     BMP:    Recent Labs   Lab 01/04/22 1033 01/05/22  0303 01/08/22  0340   *   < > 104      < > 141   K 5.4*   < > 3.7      < > 108*   CO2 20*   < > 24   BUN 16   < > 5*   CREATININE 0.89   < > 0.61   CALCIUM 10.0   < > 7.7*   MG 1.9  --   --     < > = values in this interval not displayed.     CMP:   Recent Labs   Lab 01/04/22 1033 01/05/22 0303 01/08/22  0340   *   < > 104   CALCIUM 10.0   < > 7.7*   ALBUMIN 4.3  --   --    PROT 8.2  --   --       < > 141   K 5.4*   < > 3.7   CO2 20*   < > 24      < > 108*   BUN 16   < > 5*   CREATININE 0.89   < > 0.61   ALKPHOS 86  --   --    ALT 24  --   --    AST 38*  --   --    BILITOT 0.8  --   --     < > = values in this interval not displayed.

## 2022-01-08 NOTE — ASSESSMENT & PLAN NOTE
01/04/2021 admit, iv hydration, premedicate with morhpine and hurricaine spray attempt reinsertion ng tube, bowel rest, repeat kub am, serial exams    Explained to patient if unable to tolerate insertion of ng tube higher chance of requiring exploratory surgery patient is fully vaccinated    1/5  discussed potential for surgery, and the patient would like to proceed, as she continues to have pain, despite the nasogastric tube decompression.    Risks and benefits were discussed to include infection, bleeding, possible need to open with a lower abdominal incision, hernia, anastomotic leak if resection is required, and recurrent obstruction.  She expresses understanding wishes to proceed.    01/06/2021 s/p JUANITA laparoscopically nonbilious ngt output dc NGT, ice chips/ toradol prn headache, change ivf d51/2ns 20KCL, increase activity await ileus    01/07/2021 cl liquids as tolerate, ambulate, enc I/S    1/8  Bowel function has returned, but still c/o nausea.  Patient wishes to try solid food;  Add simethicone for gas pain.  D/c later today, if dramatically improved

## 2022-01-09 LAB
ANION GAP SERPL CALCULATED.3IONS-SCNC: 13 MMOL/L (ref 7–16)
ANISOCYTOSIS BLD QL SMEAR: ABNORMAL
BASOPHILS # BLD AUTO: 0.01 K/UL (ref 0–0.2)
BASOPHILS NFR BLD AUTO: 0.3 % (ref 0–1)
BUN SERPL-MCNC: 6 MG/DL (ref 7–18)
BUN/CREAT SERPL: 9 (ref 6–20)
CALCIUM SERPL-MCNC: 8 MG/DL (ref 8.5–10.1)
CHLORIDE SERPL-SCNC: 107 MMOL/L (ref 98–107)
CO2 SERPL-SCNC: 22 MMOL/L (ref 21–32)
CREAT SERPL-MCNC: 0.67 MG/DL (ref 0.55–1.02)
DIFFERENTIAL METHOD BLD: ABNORMAL
EOSINOPHIL # BLD AUTO: 0.13 K/UL (ref 0–0.5)
EOSINOPHIL NFR BLD AUTO: 4.5 % (ref 1–4)
EOSINOPHIL NFR BLD MANUAL: 3 % (ref 1–4)
ERYTHROCYTE [DISTWIDTH] IN BLOOD BY AUTOMATED COUNT: 16.2 % (ref 11.5–14.5)
GLUCOSE SERPL-MCNC: 114 MG/DL (ref 74–106)
HCT VFR BLD AUTO: 29.2 % (ref 38–47)
HGB BLD-MCNC: 8.9 G/DL (ref 12–16)
IMM GRANULOCYTES # BLD AUTO: 0 K/UL (ref 0–0.04)
IMM GRANULOCYTES NFR BLD: 0 % (ref 0–0.4)
LYMPHOCYTES # BLD AUTO: 0.66 K/UL (ref 1–4.8)
LYMPHOCYTES NFR BLD AUTO: 22.8 % (ref 27–41)
LYMPHOCYTES NFR BLD MANUAL: 26 % (ref 27–41)
MCH RBC QN AUTO: 24.6 PG (ref 27–31)
MCHC RBC AUTO-ENTMCNC: 30.5 G/DL (ref 32–36)
MCV RBC AUTO: 80.7 FL (ref 80–96)
MONOCYTES # BLD AUTO: 0.54 K/UL (ref 0–0.8)
MONOCYTES NFR BLD AUTO: 18.6 % (ref 2–6)
MONOCYTES NFR BLD MANUAL: 14 % (ref 2–6)
MPC BLD CALC-MCNC: 9.6 FL (ref 9.4–12.4)
NEUTROPHILS # BLD AUTO: 1.56 K/UL (ref 1.8–7.7)
NEUTROPHILS NFR BLD AUTO: 53.8 % (ref 53–65)
NEUTS BAND NFR BLD MANUAL: 27 % (ref 1–5)
NEUTS SEG NFR BLD MANUAL: 30 % (ref 50–62)
NRBC # BLD AUTO: 0 X10E3/UL
NRBC, AUTO (.00): 0 %
OVALOCYTES BLD QL SMEAR: ABNORMAL
PLATELET # BLD AUTO: 218 K/UL (ref 150–400)
PLATELET MORPHOLOGY: NORMAL
POLYCHROMASIA BLD QL SMEAR: ABNORMAL
POTASSIUM SERPL-SCNC: 3.4 MMOL/L (ref 3.5–5.1)
RBC # BLD AUTO: 3.62 M/UL (ref 4.2–5.4)
SODIUM SERPL-SCNC: 139 MMOL/L (ref 136–145)
WBC # BLD AUTO: 2.9 K/UL (ref 4.5–11)

## 2022-01-09 PROCEDURE — 80048 BASIC METABOLIC PNL TOTAL CA: CPT | Performed by: NURSE PRACTITIONER

## 2022-01-09 PROCEDURE — 11000001 HC ACUTE MED/SURG PRIVATE ROOM

## 2022-01-09 PROCEDURE — 36415 COLL VENOUS BLD VENIPUNCTURE: CPT | Performed by: NURSE PRACTITIONER

## 2022-01-09 PROCEDURE — 63600175 PHARM REV CODE 636 W HCPCS: Performed by: NURSE PRACTITIONER

## 2022-01-09 PROCEDURE — 85025 COMPLETE CBC W/AUTO DIFF WBC: CPT | Performed by: NURSE PRACTITIONER

## 2022-01-09 PROCEDURE — 99900035 HC TECH TIME PER 15 MIN (STAT)

## 2022-01-09 PROCEDURE — 25000003 PHARM REV CODE 250: Performed by: NURSE PRACTITIONER

## 2022-01-09 PROCEDURE — 25000003 PHARM REV CODE 250: Performed by: SURGERY

## 2022-01-09 PROCEDURE — 94761 N-INVAS EAR/PLS OXIMETRY MLT: CPT

## 2022-01-09 RX ADMIN — MORPHINE SULFATE 4 MG: 4 INJECTION, SOLUTION INTRAMUSCULAR; INTRAVENOUS at 08:01

## 2022-01-09 RX ADMIN — ACETAMINOPHEN 650 MG: 325 TABLET ORAL at 06:01

## 2022-01-09 RX ADMIN — LISINOPRIL 20 MG: 20 TABLET ORAL at 09:01

## 2022-01-09 RX ADMIN — SIMETHICONE 80 MG: 80 TABLET, CHEWABLE ORAL at 02:01

## 2022-01-09 RX ADMIN — POTASSIUM CHLORIDE, DEXTROSE MONOHYDRATE AND SODIUM CHLORIDE: 150; 5; 450 INJECTION, SOLUTION INTRAVENOUS at 02:01

## 2022-01-09 RX ADMIN — PANTOPRAZOLE SODIUM 40 MG: 40 TABLET, DELAYED RELEASE ORAL at 09:01

## 2022-01-09 RX ADMIN — HEPARIN SODIUM 5000 UNITS: 5000 INJECTION INTRAVENOUS; SUBCUTANEOUS at 02:01

## 2022-01-09 RX ADMIN — HEPARIN SODIUM 5000 UNITS: 5000 INJECTION INTRAVENOUS; SUBCUTANEOUS at 06:01

## 2022-01-09 RX ADMIN — POTASSIUM CHLORIDE, DEXTROSE MONOHYDRATE AND SODIUM CHLORIDE: 150; 5; 450 INJECTION, SOLUTION INTRAVENOUS at 10:01

## 2022-01-09 RX ADMIN — ONDANSETRON 4 MG: 2 INJECTION INTRAMUSCULAR; INTRAVENOUS at 05:01

## 2022-01-09 NOTE — PROGRESS NOTES
[facility] - Orthopedic  General Surgery  Progress Note    Subjective:     History of Present Illness:  46 y'o female chronic constipation previous C section hysterectomy last month per Dr. Jackson (December 8th),  reportedly had adhesions at hysterectomy unable to have a good bowel movement,  Has been several days without bowel movement, no flatus, belching yesterday abdominal cramping, bloating, n/v, ct concerning for sbo with transition point at ileum  Refusing NG tube, attempting to vomit during exam PMH:  HTN, cardiac ablation 2012 for SVT        Post-Op Info:  Procedure(s) (LRB):  LAPAROSCOPY, DIAGNOSTIC (N/A)   4 Days Post-Op     Interval History:  Had nausea and emesis after small oral intake yesterday; xray with ileus type picture;  Continues to have BM and feels much better today      Medications:  Continuous Infusions:   dextrose 5 % and 0.45 % NaCl with KCl 20 mEq 125 mL/hr at 01/09/22 1030     Scheduled Meds:   heparin (porcine)  5,000 Units Subcutaneous Q8H    ketorolac  15 mg Intravenous Q6H    LIDOcaine (PF) 10 mg/ml (1%)  1 mL Intradermal Once    lisinopriL  20 mg Oral BID    pantoprazole  40 mg Oral Daily     PRN Meds:acetaminophen, benzocaine, HYDROcodone-acetaminophen, ibuprofen, LIDOcaine (PF) 10 mg/ml (1%), melatonin, morphine, ondansetron, prochlorperazine, simethicone, sodium chloride 0.9%     Review of patient's allergies indicates:  No Known Allergies  Objective:     Vital Signs (Most Recent):  Temp: 98.2 °F (36.8 °C) (01/09/22 0700)  Pulse: 80 (01/09/22 0841)  Resp: 18 (01/09/22 0841)  BP: 115/75 (01/09/22 0700)  SpO2: 98 % (01/09/22 0841) Vital Signs (24h Range):  Temp:  [98 °F (36.7 °C)-98.8 °F (37.1 °C)] 98.2 °F (36.8 °C)  Pulse:  [77-84] 80  Resp:  [16-18] 18  SpO2:  [95 %-100 %] 98 %  BP: (115-170)/(75-90) 115/75     Weight: 72.6 kg (160 lb 0.9 oz)  Body mass index is 25.83 kg/m².    Intake/Output - Last 3 Shifts       01/07 0700 01/08 0659 01/08 0700 01/09 0659 01/09  0700  01/10 0659    P.O.       I.V. (mL/kg) 0 (0)      Total Intake(mL/kg) 0 (0)      Urine (mL/kg/hr)       Drains       Total Output       Net 0             Urine Occurrence 1 x 1 x     Stool Occurrence  1 x           Physical Exam  Constitutional:       General: She is awake.      Appearance: Normal appearance.   Cardiovascular:      Rate and Rhythm: Normal rate and regular rhythm.   Pulmonary:      Effort: Pulmonary effort is normal.   Chest:      Chest wall: No deformity.   Abdominal:      General: Bowel sounds are normal. There is distension.      Palpations: Abdomen is soft.      Tenderness: There is no abdominal tenderness.   Musculoskeletal:         General: No swelling.      Right lower leg: No edema.      Left lower leg: No edema.   Skin:     General: Skin is warm and dry.      Capillary Refill: Capillary refill takes less than 2 seconds.   Neurological:      General: No focal deficit present.      Mental Status: She is alert.   Psychiatric:         Mood and Affect: Mood normal.         Significant Labs:  CBC:   Recent Labs   Lab 01/09/22  0607   WBC 2.90*   RBC 3.62*   HGB 8.9*   HCT 29.2*      MCV 80.7   MCH 24.6*   MCHC 30.5*     BMP:   Recent Labs   Lab 01/04/22  1033 01/05/22  0303 01/09/22  0607   *   < > 114*      < > 139   K 5.4*   < > 3.4*      < > 107   CO2 20*   < > 22   BUN 16   < > 6*   CREATININE 0.89   < > 0.67   CALCIUM 10.0   < > 8.0*   MG 1.9  --   --     < > = values in this interval not displayed.       Significant Diagnostics:  I have reviewed all pertinent imaging results/findings within the past 24 hours.  Abd XR:  Air fluid levels in colon and small bowel with some dilation of small bowel    Assessment/Plan:     * Small bowel obstruction  01/04/2021 admit, iv hydration, premedicate with morhpine and hurricaine spray attempt reinsertion ng tube, bowel rest, repeat kub am, serial exams    Explained to patient if unable to tolerate insertion of ng tube higher  chance of requiring exploratory surgery patient is fully vaccinated    1/5  discussed potential for surgery, and the patient would like to proceed, as she continues to have pain, despite the nasogastric tube decompression.    Risks and benefits were discussed to include infection, bleeding, possible need to open with a lower abdominal incision, hernia, anastomotic leak if resection is required, and recurrent obstruction.  She expresses understanding wishes to proceed.    01/06/2021 s/p JUANITA laparoscopically nonbilious ngt output dc NGT, ice chips/ toradol prn headache, change ivf d51/2ns 20KCL, increase activity await ileus    01/07/2021 cl liquids as tolerate, ambulate, enc I/S    1/8  Bowel function has returned, but still c/o nausea.  Patient wishes to try solid food;  Add simethicone for gas pain.  D/c later today, if dramatically improved    1/9  Moved back to npo after emesis yesterday; now improved; will attempt solids again today, if xray looks better        Alexis Calabrese MD  General Surgery  [facility] - Orthopedic

## 2022-01-09 NOTE — SUBJECTIVE & OBJECTIVE
Interval History:  Had nausea and emesis after small oral intake yesterday; xray with ileus type picture;  Continues to have BM and feels much better today      Medications:  Continuous Infusions:   dextrose 5 % and 0.45 % NaCl with KCl 20 mEq 125 mL/hr at 01/09/22 1030     Scheduled Meds:   heparin (porcine)  5,000 Units Subcutaneous Q8H    ketorolac  15 mg Intravenous Q6H    LIDOcaine (PF) 10 mg/ml (1%)  1 mL Intradermal Once    lisinopriL  20 mg Oral BID    pantoprazole  40 mg Oral Daily     PRN Meds:acetaminophen, benzocaine, HYDROcodone-acetaminophen, ibuprofen, LIDOcaine (PF) 10 mg/ml (1%), melatonin, morphine, ondansetron, prochlorperazine, simethicone, sodium chloride 0.9%     Review of patient's allergies indicates:  No Known Allergies  Objective:     Vital Signs (Most Recent):  Temp: 98.2 °F (36.8 °C) (01/09/22 0700)  Pulse: 80 (01/09/22 0841)  Resp: 18 (01/09/22 0841)  BP: 115/75 (01/09/22 0700)  SpO2: 98 % (01/09/22 0841) Vital Signs (24h Range):  Temp:  [98 °F (36.7 °C)-98.8 °F (37.1 °C)] 98.2 °F (36.8 °C)  Pulse:  [77-84] 80  Resp:  [16-18] 18  SpO2:  [95 %-100 %] 98 %  BP: (115-170)/(75-90) 115/75     Weight: 72.6 kg (160 lb 0.9 oz)  Body mass index is 25.83 kg/m².    Intake/Output - Last 3 Shifts       01/07 0700  01/08 0659 01/08 0700  01/09 0659 01/09 0700  01/10 0659    P.O.       I.V. (mL/kg) 0 (0)      Total Intake(mL/kg) 0 (0)      Urine (mL/kg/hr)       Drains       Total Output       Net 0             Urine Occurrence 1 x 1 x     Stool Occurrence  1 x           Physical Exam  Constitutional:       General: She is awake.      Appearance: Normal appearance.   Cardiovascular:      Rate and Rhythm: Normal rate and regular rhythm.   Pulmonary:      Effort: Pulmonary effort is normal.   Chest:      Chest wall: No deformity.   Abdominal:      General: Bowel sounds are normal. There is distension.      Palpations: Abdomen is soft.      Tenderness: There is no abdominal tenderness.    Musculoskeletal:         General: No swelling.      Right lower leg: No edema.      Left lower leg: No edema.   Skin:     General: Skin is warm and dry.      Capillary Refill: Capillary refill takes less than 2 seconds.   Neurological:      General: No focal deficit present.      Mental Status: She is alert.   Psychiatric:         Mood and Affect: Mood normal.         Significant Labs:  CBC:   Recent Labs   Lab 01/09/22  0607   WBC 2.90*   RBC 3.62*   HGB 8.9*   HCT 29.2*      MCV 80.7   MCH 24.6*   MCHC 30.5*     BMP:   Recent Labs   Lab 01/04/22  1033 01/05/22  0303 01/09/22  0607   *   < > 114*      < > 139   K 5.4*   < > 3.4*      < > 107   CO2 20*   < > 22   BUN 16   < > 6*   CREATININE 0.89   < > 0.67   CALCIUM 10.0   < > 8.0*   MG 1.9  --   --     < > = values in this interval not displayed.       Significant Diagnostics:  I have reviewed all pertinent imaging results/findings within the past 24 hours.  Abd XR:  Air fluid levels in colon and small bowel with some dilation of small bowel

## 2022-01-09 NOTE — ASSESSMENT & PLAN NOTE
01/04/2021 admit, iv hydration, premedicate with morhpine and hurricaine spray attempt reinsertion ng tube, bowel rest, repeat kub am, serial exams    Explained to patient if unable to tolerate insertion of ng tube higher chance of requiring exploratory surgery patient is fully vaccinated    1/5  discussed potential for surgery, and the patient would like to proceed, as she continues to have pain, despite the nasogastric tube decompression.    Risks and benefits were discussed to include infection, bleeding, possible need to open with a lower abdominal incision, hernia, anastomotic leak if resection is required, and recurrent obstruction.  She expresses understanding wishes to proceed.    01/06/2021 s/p JUANITA laparoscopically nonbilious ngt output dc NGT, ice chips/ toradol prn headache, change ivf d51/2ns 20KCL, increase activity await ileus    01/07/2021 cl liquids as tolerate, ambulate, enc I/S    1/8  Bowel function has returned, but still c/o nausea.  Patient wishes to try solid food;  Add simethicone for gas pain.  D/c later today, if dramatically improved    1/9  Moved back to npo after emesis yesterday; now improved; will attempt solids again today, if xray looks better

## 2022-01-10 LAB
ANION GAP SERPL CALCULATED.3IONS-SCNC: 11 MMOL/L (ref 7–16)
ANISOCYTOSIS BLD QL SMEAR: ABNORMAL
BASOPHILS # BLD AUTO: 0.01 K/UL (ref 0–0.2)
BASOPHILS NFR BLD AUTO: 0.3 % (ref 0–1)
BUN SERPL-MCNC: 6 MG/DL (ref 7–18)
BUN/CREAT SERPL: 9 (ref 6–20)
C COLI+JEJ+UPSA DNA STL QL NAA+NON-PROBE: NEGATIVE
CALCIUM SERPL-MCNC: 7.9 MG/DL (ref 8.5–10.1)
CHLORIDE SERPL-SCNC: 107 MMOL/L (ref 98–107)
CO2 SERPL-SCNC: 22 MMOL/L (ref 21–32)
CREAT SERPL-MCNC: 0.67 MG/DL (ref 0.55–1.02)
DIFFERENTIAL METHOD BLD: ABNORMAL
E COLI SXT1 STL QL IA: NEGATIVE
E COLI SXT2 STL QL IA: NEGATIVE
EOSINOPHIL # BLD AUTO: 0.23 K/UL (ref 0–0.5)
EOSINOPHIL NFR BLD AUTO: 5.9 % (ref 1–4)
EOSINOPHIL NFR BLD MANUAL: 7 % (ref 1–4)
ERYTHROCYTE [DISTWIDTH] IN BLOOD BY AUTOMATED COUNT: 16.4 % (ref 11.5–14.5)
GLUCOSE SERPL-MCNC: 119 MG/DL (ref 74–106)
HCT VFR BLD AUTO: 28.3 % (ref 38–47)
HGB BLD-MCNC: 9 G/DL (ref 12–16)
IMM GRANULOCYTES # BLD AUTO: 0.01 K/UL (ref 0–0.04)
IMM GRANULOCYTES NFR BLD: 0.3 % (ref 0–0.4)
LYMPHOCYTES # BLD AUTO: 0.91 K/UL (ref 1–4.8)
LYMPHOCYTES NFR BLD AUTO: 23.2 % (ref 27–41)
LYMPHOCYTES NFR BLD MANUAL: 25 % (ref 27–41)
MAGNESIUM SERPL-MCNC: 1.6 MG/DL (ref 1.7–2.3)
MCH RBC QN AUTO: 25.4 PG (ref 27–31)
MCHC RBC AUTO-ENTMCNC: 31.8 G/DL (ref 32–36)
MCV RBC AUTO: 79.9 FL (ref 80–96)
MONOCYTES # BLD AUTO: 0.8 K/UL (ref 0–0.8)
MONOCYTES NFR BLD AUTO: 20.4 % (ref 2–6)
MONOCYTES NFR BLD MANUAL: 15 % (ref 2–6)
MPC BLD CALC-MCNC: 10 FL (ref 9.4–12.4)
NEUTROPHILS # BLD AUTO: 1.97 K/UL (ref 1.8–7.7)
NEUTROPHILS NFR BLD AUTO: 49.9 % (ref 53–65)
NEUTS BAND NFR BLD MANUAL: 24 % (ref 1–5)
NEUTS SEG NFR BLD MANUAL: 29 % (ref 50–62)
NOROVIRUS GI+II RNA STL QL NAA+NON-PROBE: NEGATIVE
NRBC # BLD AUTO: 0 X10E3/UL
NRBC, AUTO (.00): 0 %
OVALOCYTES BLD QL SMEAR: ABNORMAL
PLATELET # BLD AUTO: 267 K/UL (ref 150–400)
PLATELET MORPHOLOGY: NORMAL
POLYCHROMASIA BLD QL SMEAR: ABNORMAL
POTASSIUM SERPL-SCNC: 3.3 MMOL/L (ref 3.5–5.1)
RBC # BLD AUTO: 3.54 M/UL (ref 4.2–5.4)
RVA RNA STL QL NAA+NON-PROBE: NEGATIVE
S ENT+BONG DNA STL QL NAA+NON-PROBE: NEGATIVE
SHIGELLA SPECIES NAT: NEGATIVE
SODIUM SERPL-SCNC: 137 MMOL/L (ref 136–145)
V CHOL+PARA+VUL DNA STL QL NAA+NON-PROBE: NEGATIVE
WBC # BLD AUTO: 3.93 K/UL (ref 4.5–11)
Y ENTEROCOL DNA STL QL NAA+NON-PROBE: NEGATIVE

## 2022-01-10 PROCEDURE — 11000001 HC ACUTE MED/SURG PRIVATE ROOM

## 2022-01-10 PROCEDURE — 87045 FECES CULTURE AEROBIC BACT: CPT | Performed by: NURSE PRACTITIONER

## 2022-01-10 PROCEDURE — 87209 SMEAR COMPLEX STAIN: CPT | Performed by: NURSE PRACTITIONER

## 2022-01-10 PROCEDURE — 85025 COMPLETE CBC W/AUTO DIFF WBC: CPT | Performed by: NURSE PRACTITIONER

## 2022-01-10 PROCEDURE — 99900035 HC TECH TIME PER 15 MIN (STAT)

## 2022-01-10 PROCEDURE — 83735 ASSAY OF MAGNESIUM: CPT | Performed by: NURSE PRACTITIONER

## 2022-01-10 PROCEDURE — 36415 COLL VENOUS BLD VENIPUNCTURE: CPT | Performed by: NURSE PRACTITIONER

## 2022-01-10 PROCEDURE — 63600175 PHARM REV CODE 636 W HCPCS: Performed by: NURSE PRACTITIONER

## 2022-01-10 PROCEDURE — 87506 IADNA-DNA/RNA PROBE TQ 6-11: CPT | Performed by: SURGERY

## 2022-01-10 PROCEDURE — 25500020 PHARM REV CODE 255: Performed by: SURGERY

## 2022-01-10 PROCEDURE — 25000003 PHARM REV CODE 250: Performed by: SURGERY

## 2022-01-10 PROCEDURE — 87493 C DIFF AMPLIFIED PROBE: CPT | Performed by: NURSE PRACTITIONER

## 2022-01-10 PROCEDURE — 80048 BASIC METABOLIC PNL TOTAL CA: CPT | Performed by: NURSE PRACTITIONER

## 2022-01-10 PROCEDURE — 25000003 PHARM REV CODE 250: Performed by: NURSE PRACTITIONER

## 2022-01-10 RX ORDER — POTASSIUM CHLORIDE 7.45 MG/ML
20 INJECTION INTRAVENOUS ONCE
Status: COMPLETED | OUTPATIENT
Start: 2022-01-10 | End: 2022-01-10

## 2022-01-10 RX ORDER — MAGNESIUM SULFATE HEPTAHYDRATE 40 MG/ML
2 INJECTION, SOLUTION INTRAVENOUS ONCE
Status: COMPLETED | OUTPATIENT
Start: 2022-01-10 | End: 2022-01-10

## 2022-01-10 RX ADMIN — PANTOPRAZOLE SODIUM 40 MG: 40 TABLET, DELAYED RELEASE ORAL at 10:01

## 2022-01-10 RX ADMIN — HEPARIN SODIUM 5000 UNITS: 5000 INJECTION INTRAVENOUS; SUBCUTANEOUS at 02:01

## 2022-01-10 RX ADMIN — IOPAMIDOL 100 ML: 755 INJECTION, SOLUTION INTRAVENOUS at 04:01

## 2022-01-10 RX ADMIN — LISINOPRIL 20 MG: 20 TABLET ORAL at 10:01

## 2022-01-10 RX ADMIN — POTASSIUM CHLORIDE, DEXTROSE MONOHYDRATE AND SODIUM CHLORIDE: 150; 5; 450 INJECTION, SOLUTION INTRAVENOUS at 01:01

## 2022-01-10 RX ADMIN — HEPARIN SODIUM 5000 UNITS: 5000 INJECTION INTRAVENOUS; SUBCUTANEOUS at 09:01

## 2022-01-10 RX ADMIN — POTASSIUM CHLORIDE, DEXTROSE MONOHYDRATE AND SODIUM CHLORIDE: 150; 5; 450 INJECTION, SOLUTION INTRAVENOUS at 07:01

## 2022-01-10 RX ADMIN — HEPARIN SODIUM 5000 UNITS: 5000 INJECTION INTRAVENOUS; SUBCUTANEOUS at 06:01

## 2022-01-10 RX ADMIN — MAGNESIUM SULFATE IN WATER 2 G: 40 INJECTION, SOLUTION INTRAVENOUS at 09:01

## 2022-01-10 RX ADMIN — POTASSIUM CHLORIDE, DEXTROSE MONOHYDRATE AND SODIUM CHLORIDE: 150; 5; 450 INJECTION, SOLUTION INTRAVENOUS at 10:01

## 2022-01-10 RX ADMIN — PROCHLORPERAZINE EDISYLATE 5 MG: 5 INJECTION INTRAMUSCULAR; INTRAVENOUS at 04:01

## 2022-01-10 RX ADMIN — LISINOPRIL 20 MG: 20 TABLET ORAL at 09:01

## 2022-01-10 RX ADMIN — ONDANSETRON 4 MG: 2 INJECTION INTRAMUSCULAR; INTRAVENOUS at 02:01

## 2022-01-10 RX ADMIN — POTASSIUM CHLORIDE 20 MEQ: 7.46 INJECTION, SOLUTION INTRAVENOUS at 10:01

## 2022-01-10 NOTE — SUBJECTIVE & OBJECTIVE
Interval History:  N/v yesterday afternoon, diarrhea, no flatus kub mildly improved dilated small bowel     Medications:  Continuous Infusions:   dextrose 5 % and 0.45 % NaCl with KCl 20 mEq 125 mL/hr at 01/10/22 1021     Scheduled Meds:   heparin (porcine)  5,000 Units Subcutaneous Q8H    LIDOcaine (PF) 10 mg/ml (1%)  1 mL Intradermal Once    lisinopriL  20 mg Oral BID    pantoprazole  40 mg Oral Daily    potassium chloride in water  20 mEq Intravenous Once     PRN Meds:acetaminophen, benzocaine, HYDROcodone-acetaminophen, ibuprofen, LIDOcaine (PF) 10 mg/ml (1%), melatonin, morphine, ondansetron, prochlorperazine, simethicone, sodium chloride 0.9%     Review of patient's allergies indicates:  No Known Allergies  Objective:     Vital Signs (Most Recent):  Temp: 98.5 °F (36.9 °C) (01/10/22 1205)  Pulse: 78 (01/10/22 1205)  Resp: 19 (01/10/22 1205)  BP: (!) 144/89 (01/10/22 1205)  SpO2: 99 % (01/10/22 1205) Vital Signs (24h Range):  Temp:  [97.8 °F (36.6 °C)-99.2 °F (37.3 °C)] 98.5 °F (36.9 °C)  Pulse:  [78-91] 78  Resp:  [16-19] 19  SpO2:  [96 %-99 %] 99 %  BP: (128-157)/(86-99) 144/89     Weight: 72.6 kg (160 lb 0.9 oz)  Body mass index is 25.83 kg/m².    Intake/Output - Last 3 Shifts       01/08 0700  01/09 0659 01/09 0700  01/10 0659 01/10 0700 01/11 0659    I.V. (mL/kg)       Total Intake(mL/kg)       Net              Urine Occurrence 1 x      Stool Occurrence 1 x            Physical Exam  Vitals and nursing note reviewed.   HENT:      Head: Normocephalic.      Mouth/Throat:      Mouth: Mucous membranes are dry.   Eyes:      Conjunctiva/sclera: Conjunctivae normal.   Cardiovascular:      Rate and Rhythm: Tachycardia present.      Pulses: Normal pulses.   Pulmonary:      Effort: Pulmonary effort is normal.      Breath sounds: Normal breath sounds.   Abdominal:      Palpations: Abdomen is soft.      Comments: abd dsgs c/d/i  Hyperactive bowel sounds   Skin:     General: Skin is warm and dry.      Capillary  Refill: Capillary refill takes less than 2 seconds.   Neurological:      General: No focal deficit present.      Mental Status: She is alert.   Psychiatric:         Mood and Affect: Mood normal.         Significant Labs:  I have reviewed all pertinent lab results within the past 24 hours.  CBC:   Recent Labs   Lab 01/10/22  0604   WBC 3.93*   RBC 3.54*   HGB 9.0*   HCT 28.3*      MCV 79.9*   MCH 25.4*   MCHC 31.8*     BMP:   Recent Labs   Lab 01/10/22  0604   *      K 3.3*      CO2 22   BUN 6*   CREATININE 0.67   CALCIUM 7.9*   MG 1.6*     CMP:   Recent Labs   Lab 01/04/22  1033 01/05/22  0303 01/10/22  0604   *   < > 119*   CALCIUM 10.0   < > 7.9*   ALBUMIN 4.3  --   --    PROT 8.2  --   --       < > 137   K 5.4*   < > 3.3*   CO2 20*   < > 22      < > 107   BUN 16   < > 6*   CREATININE 0.89   < > 0.67   ALKPHOS 86  --   --    ALT 24  --   --    AST 38*  --   --    BILITOT 0.8  --   --     < > = values in this interval not displayed.     LFTs:   Recent Labs   Lab 01/04/22  1033   ALT 24   AST 38*   ALKPHOS 86   BILITOT 0.8   PROT 8.2   ALBUMIN 4.3       Significant Diagnostics:  I have reviewed all pertinent imaging results/findings within the past 24 hours.

## 2022-01-10 NOTE — PLAN OF CARE
Problem: Adult Inpatient Plan of Care  Goal: Plan of Care Review  Outcome: Ongoing, Progressing  Flowsheets (Taken 1/10/2022 0852)  Plan of Care Reviewed With:   spouse   patient  Goal: Patient-Specific Goal (Individualized)  Outcome: Ongoing, Progressing  Flowsheets (Taken 1/10/2022 0852)  Individualized Care Needs: Pain management  Patient-Specific Goals (Include Timeframe): Pain management  Goal: Absence of Hospital-Acquired Illness or Injury  Outcome: Ongoing, Progressing  Intervention: Identify and Manage Fall Risk  Flowsheets (Taken 1/10/2022 0852)  Safety Promotion/Fall Prevention:   assistive device/personal item within reach   diversional activities provided   family to remain at bedside   side rails raised x 3   instructed to call staff for mobility  Intervention: Prevent Skin Injury  Flowsheets (Taken 1/10/2022 0852)  Body Position: position changed independently  Skin Protection:   adhesive use limited   incontinence pads utilized  Intervention: Prevent and Manage VTE (Venous Thromboembolism) Risk  Flowsheets (Taken 1/10/2022 0852)  Activity Management:   Arm raise - L1   Ambulated -L4  VTE Prevention/Management:   ambulation promoted   fluids promoted   intravenous hydration   ROM (active) performed  Range of Motion: ROM (range of motion) performed  Intervention: Prevent Infection  Flowsheets (Taken 1/10/2022 0852)  Infection Prevention:   equipment surfaces disinfected   hand hygiene promoted   rest/sleep promoted   single patient room provided  Goal: Optimal Comfort and Wellbeing  Outcome: Ongoing, Progressing  Intervention: Monitor Pain and Promote Comfort  Flowsheets (Taken 1/10/2022 0852)  Pain Management Interventions:   care clustered   diversional activity provided   medication offered   pillow support provided   quiet environment facilitated   relaxation techniques promoted  Intervention: Provide Person-Centered Care  Flowsheets (Taken 1/10/2022 0852)  Trust Relationship/Rapport:   care  explained   choices provided   questions answered   questions encouraged  Goal: Readiness for Transition of Care  Outcome: Ongoing, Progressing  Intervention: Mutually Develop Transition Plan  Flowsheets (Taken 1/10/2022 0852)  Equipment Currently Used at Home: none  Transportation Anticipated: family or friend will provide  Communicated TIKI with patient/caregiver: Date not available/Unable to determine  Do you expect to return to your current living situation?: Yes  Do you have help at home or someone to help you manage your care at home?: Yes     Problem: Fall Injury Risk  Goal: Absence of Fall and Fall-Related Injury  Outcome: Ongoing, Progressing  Intervention: Identify and Manage Contributors  Flowsheets (Taken 1/10/2022 0852)  Self-Care Promotion: independence encouraged  Medication Review/Management: medications reviewed  Intervention: Promote Injury-Free Environment  Flowsheets (Taken 1/10/2022 0852)  Safety Promotion/Fall Prevention:   assistive device/personal item within reach   diversional activities provided   family to remain at bedside   side rails raised x 3   instructed to call staff for mobility     Problem: Skin Injury Risk Increased  Goal: Skin Health and Integrity  Outcome: Ongoing, Progressing  Intervention: Optimize Skin Protection  Flowsheets (Taken 1/10/2022 0852)  Pressure Reduction Techniques: frequent weight shift encouraged  Pressure Reduction Devices: positioning supports utilized  Skin Protection:   adhesive use limited   incontinence pads utilized  Head of Bed (HOB) Positioning: HOB at 30-45 degrees  Intervention: Promote and Optimize Oral Intake  Flowsheets (Taken 1/10/2022 0852)  Oral Nutrition Promotion: rest periods promoted     Problem: Infection  Goal: Absence of Infection Signs and Symptoms  Outcome: Ongoing, Progressing  Intervention: Prevent or Manage Infection  Flowsheets (Taken 1/10/2022 0852)  Fever Reduction/Comfort Measures: lightweight clothing  Infection Management:  aseptic technique maintained  Isolation Precautions:   protective   precautions maintained

## 2022-01-10 NOTE — PLAN OF CARE
Chart reviewed, Cont iv fluids, replace k+, check C-diff. CT of abd and pelvis with oral contrast ordered. SW followed.

## 2022-01-10 NOTE — ASSESSMENT & PLAN NOTE
01/04/2021 admit, iv hydration, premedicate with morhpine and hurricaine spray attempt reinsertion ng tube, bowel rest, repeat kub am, serial exams    Explained to patient if unable to tolerate insertion of ng tube higher chance of requiring exploratory surgery patient is fully vaccinated    1/5  discussed potential for surgery, and the patient would like to proceed, as she continues to have pain, despite the nasogastric tube decompression.    Risks and benefits were discussed to include infection, bleeding, possible need to open with a lower abdominal incision, hernia, anastomotic leak if resection is required, and recurrent obstruction.  She expresses understanding wishes to proceed.    01/06/2021 s/p JUANITA laparoscopically nonbilious ngt output dc NGT, ice chips/ toradol prn headache, change ivf d51/2ns 20KCL, increase activity await ileus    01/07/2021 cl liquids as tolerate, ambulate, enc I/S    1/8  Bowel function has returned, but still c/o nausea.  Patient wishes to try solid food;  Add simethicone for gas pain.  D/c later today, if dramatically improved    1/9  Moved back to npo after emesis yesterday; now improved; will attempt solids again today, if xray looks better    01/10/2021 continue IVF replace K check C-DIFF, ct abd pelvis with oral contrast

## 2022-01-10 NOTE — PROGRESS NOTES
[facility], [place] - Orthopedic  General Surgery  Progress Note    Subjective:     History of Present Illness:  46 y'o female chronic constipation previous C section hysterectomy last month per Dr. Jackson (December 8th),  reportedly had adhesions at hysterectomy unable to have a good bowel movement,  Has been several days without bowel movement, no flatus, belching yesterday abdominal cramping, bloating, n/v, ct concerning for sbo with transition point at ileum  Refusing NG tube, attempting to vomit during exam PMH:  HTN, cardiac ablation 2012 for SVT        Post-Op Info:  Procedure(s) (LRB):  LAPAROSCOPY, DIAGNOSTIC (N/A)   5 Days Post-Op     Interval History:  N/v yesterday afternoon, diarrhea, no flatus kub mildly improved dilated small bowel     Medications:  Continuous Infusions:   dextrose 5 % and 0.45 % NaCl with KCl 20 mEq 125 mL/hr at 01/10/22 1021     Scheduled Meds:   heparin (porcine)  5,000 Units Subcutaneous Q8H    LIDOcaine (PF) 10 mg/ml (1%)  1 mL Intradermal Once    lisinopriL  20 mg Oral BID    pantoprazole  40 mg Oral Daily    potassium chloride in water  20 mEq Intravenous Once     PRN Meds:acetaminophen, benzocaine, HYDROcodone-acetaminophen, ibuprofen, LIDOcaine (PF) 10 mg/ml (1%), melatonin, morphine, ondansetron, prochlorperazine, simethicone, sodium chloride 0.9%     Review of patient's allergies indicates:  No Known Allergies  Objective:     Vital Signs (Most Recent):  Temp: 98.5 °F (36.9 °C) (01/10/22 1205)  Pulse: 78 (01/10/22 1205)  Resp: 19 (01/10/22 1205)  BP: (!) 144/89 (01/10/22 1205)  SpO2: 99 % (01/10/22 1205) Vital Signs (24h Range):  Temp:  [97.8 °F (36.6 °C)-99.2 °F (37.3 °C)] 98.5 °F (36.9 °C)  Pulse:  [78-91] 78  Resp:  [16-19] 19  SpO2:  [96 %-99 %] 99 %  BP: (128-157)/(86-99) 144/89     Weight: 72.6 kg (160 lb 0.9 oz)  Body mass index is 25.83 kg/m².    Intake/Output - Last 3 Shifts       01/08 0700  01/09 0659 01/09 0700  01/10 0659 01/10 0700  01/11 0659    I.V. (mL/kg)        Total Intake(mL/kg)       Net              Urine Occurrence 1 x      Stool Occurrence 1 x            Physical Exam  Vitals and nursing note reviewed.   HENT:      Head: Normocephalic.      Mouth/Throat:      Mouth: Mucous membranes are dry.   Eyes:      Conjunctiva/sclera: Conjunctivae normal.   Cardiovascular:      Rate and Rhythm: Tachycardia present.      Pulses: Normal pulses.   Pulmonary:      Effort: Pulmonary effort is normal.      Breath sounds: Normal breath sounds.   Abdominal:      Palpations: Abdomen is soft.      Comments: abd dsgs c/d/i  Hyperactive bowel sounds   Skin:     General: Skin is warm and dry.      Capillary Refill: Capillary refill takes less than 2 seconds.   Neurological:      General: No focal deficit present.      Mental Status: She is alert.   Psychiatric:         Mood and Affect: Mood normal.         Significant Labs:  I have reviewed all pertinent lab results within the past 24 hours.  CBC:   Recent Labs   Lab 01/10/22  0604   WBC 3.93*   RBC 3.54*   HGB 9.0*   HCT 28.3*      MCV 79.9*   MCH 25.4*   MCHC 31.8*     BMP:   Recent Labs   Lab 01/10/22  0604   *      K 3.3*      CO2 22   BUN 6*   CREATININE 0.67   CALCIUM 7.9*   MG 1.6*     CMP:   Recent Labs   Lab 01/04/22  1033 01/05/22  0303 01/10/22  0604   *   < > 119*   CALCIUM 10.0   < > 7.9*   ALBUMIN 4.3  --   --    PROT 8.2  --   --       < > 137   K 5.4*   < > 3.3*   CO2 20*   < > 22      < > 107   BUN 16   < > 6*   CREATININE 0.89   < > 0.67   ALKPHOS 86  --   --    ALT 24  --   --    AST 38*  --   --    BILITOT 0.8  --   --     < > = values in this interval not displayed.     LFTs:   Recent Labs   Lab 01/04/22  1033   ALT 24   AST 38*   ALKPHOS 86   BILITOT 0.8   PROT 8.2   ALBUMIN 4.3       Significant Diagnostics:  I have reviewed all pertinent imaging results/findings within the past 24 hours.    Assessment/Plan:     * Small bowel obstruction  01/04/2021 admit, iv  hydration, premedicate with morhpine and hurricaine spray attempt reinsertion ng tube, bowel rest, repeat kub am, serial exams    Explained to patient if unable to tolerate insertion of ng tube higher chance of requiring exploratory surgery patient is fully vaccinated    1/5  discussed potential for surgery, and the patient would like to proceed, as she continues to have pain, despite the nasogastric tube decompression.    Risks and benefits were discussed to include infection, bleeding, possible need to open with a lower abdominal incision, hernia, anastomotic leak if resection is required, and recurrent obstruction.  She expresses understanding wishes to proceed.    01/06/2021 s/p JUANITA laparoscopically nonbilious ngt output dc NGT, ice chips/ toradol prn headache, change ivf d51/2ns 20KCL, increase activity await ileus    01/07/2021 cl liquids as tolerate, ambulate, enc I/S    1/8  Bowel function has returned, but still c/o nausea.  Patient wishes to try solid food;  Add simethicone for gas pain.  D/c later today, if dramatically improved    1/9  Moved back to npo after emesis yesterday; now improved; will attempt solids again today, if xray looks better    01/10/2021 continue IVF replace K check C-DIFF, ct abd pelvis with oral contrast         Raine Pratt, CHIKISP  General Surgery  [facility] - Orthopedic

## 2022-01-11 LAB
ALBUMIN SERPL BCP-MCNC: 2.5 G/DL (ref 3.5–5)
ALBUMIN/GLOB SERPL: 0.8 {RATIO}
ALP SERPL-CCNC: 79 U/L (ref 39–100)
ALT SERPL W P-5'-P-CCNC: 37 U/L (ref 13–56)
ANION GAP SERPL CALCULATED.3IONS-SCNC: 13 MMOL/L (ref 7–16)
ANISOCYTOSIS BLD QL SMEAR: ABNORMAL
AST SERPL W P-5'-P-CCNC: 24 U/L (ref 15–37)
BASOPHILS # BLD AUTO: 0.01 K/UL (ref 0–0.2)
BASOPHILS NFR BLD AUTO: 0.2 % (ref 0–1)
BILIRUB SERPL-MCNC: 0.3 MG/DL (ref 0–1.2)
BUN SERPL-MCNC: 5 MG/DL (ref 7–18)
BUN/CREAT SERPL: 8 (ref 6–20)
C DIFF TOX A+B STL IA-ACNC: NEGATIVE
CALCIUM SERPL-MCNC: 7.8 MG/DL (ref 8.5–10.1)
CHLORIDE SERPL-SCNC: 106 MMOL/L (ref 98–107)
CO2 SERPL-SCNC: 22 MMOL/L (ref 21–32)
CREAT SERPL-MCNC: 0.65 MG/DL (ref 0.55–1.02)
DIFFERENTIAL METHOD BLD: ABNORMAL
EOSINOPHIL # BLD AUTO: 0.23 K/UL (ref 0–0.5)
EOSINOPHIL NFR BLD AUTO: 4.6 % (ref 1–4)
EOSINOPHIL NFR BLD MANUAL: 3 % (ref 1–4)
ERYTHROCYTE [DISTWIDTH] IN BLOOD BY AUTOMATED COUNT: 16.2 % (ref 11.5–14.5)
GLOBULIN SER-MCNC: 3.1 G/DL (ref 2–4)
GLUCOSE SERPL-MCNC: 105 MG/DL (ref 74–106)
HCT VFR BLD AUTO: 27.7 % (ref 38–47)
HGB BLD-MCNC: 9.1 G/DL (ref 12–16)
IMM GRANULOCYTES # BLD AUTO: 0.01 K/UL (ref 0–0.04)
IMM GRANULOCYTES NFR BLD: 0.2 % (ref 0–0.4)
LYMPHOCYTES # BLD AUTO: 1.13 K/UL (ref 1–4.8)
LYMPHOCYTES NFR BLD AUTO: 22.6 % (ref 27–41)
LYMPHOCYTES NFR BLD MANUAL: 25 % (ref 27–41)
MAGNESIUM SERPL-MCNC: 2.1 MG/DL (ref 1.7–2.3)
MCH RBC QN AUTO: 24.7 PG (ref 27–31)
MCHC RBC AUTO-ENTMCNC: 32.9 G/DL (ref 32–36)
MCV RBC AUTO: 75.3 FL (ref 80–96)
MICROCYTES BLD QL SMEAR: ABNORMAL
MONOCYTES # BLD AUTO: 0.89 K/UL (ref 0–0.8)
MONOCYTES NFR BLD AUTO: 17.8 % (ref 2–6)
MONOCYTES NFR BLD MANUAL: 18 % (ref 2–6)
MPC BLD CALC-MCNC: 10.4 FL (ref 9.4–12.4)
NEUTROPHILS # BLD AUTO: 2.73 K/UL (ref 1.8–7.7)
NEUTROPHILS NFR BLD AUTO: 54.6 % (ref 53–65)
NEUTS BAND NFR BLD MANUAL: 22 % (ref 1–5)
NEUTS SEG NFR BLD MANUAL: 32 % (ref 50–62)
NRBC # BLD AUTO: 0 X10E3/UL
NRBC, AUTO (.00): 0 %
OVALOCYTES BLD QL SMEAR: ABNORMAL
PLATELET # BLD AUTO: 254 K/UL (ref 150–400)
PLATELET MORPHOLOGY: NORMAL
POLYCHROMASIA BLD QL SMEAR: ABNORMAL
POTASSIUM SERPL-SCNC: 3.6 MMOL/L (ref 3.5–5.1)
PROT SERPL-MCNC: 5.6 G/DL (ref 6.4–8.2)
RBC # BLD AUTO: 3.68 M/UL (ref 4.2–5.4)
SODIUM SERPL-SCNC: 137 MMOL/L (ref 136–145)
WBC # BLD AUTO: 5 K/UL (ref 4.5–11)

## 2022-01-11 PROCEDURE — 80053 COMPREHEN METABOLIC PANEL: CPT | Performed by: NURSE PRACTITIONER

## 2022-01-11 PROCEDURE — 63600175 PHARM REV CODE 636 W HCPCS: Performed by: NURSE PRACTITIONER

## 2022-01-11 PROCEDURE — 99900035 HC TECH TIME PER 15 MIN (STAT)

## 2022-01-11 PROCEDURE — 11000001 HC ACUTE MED/SURG PRIVATE ROOM

## 2022-01-11 PROCEDURE — 25000003 PHARM REV CODE 250: Performed by: NURSE PRACTITIONER

## 2022-01-11 PROCEDURE — 36415 COLL VENOUS BLD VENIPUNCTURE: CPT | Performed by: NURSE PRACTITIONER

## 2022-01-11 PROCEDURE — 25000003 PHARM REV CODE 250: Performed by: SURGERY

## 2022-01-11 PROCEDURE — 85025 COMPLETE CBC W/AUTO DIFF WBC: CPT | Performed by: NURSE PRACTITIONER

## 2022-01-11 PROCEDURE — 83735 ASSAY OF MAGNESIUM: CPT | Performed by: NURSE PRACTITIONER

## 2022-01-11 RX ORDER — LORAZEPAM 0.5 MG/1
0.5 TABLET ORAL 2 TIMES DAILY
Status: DISCONTINUED | OUTPATIENT
Start: 2022-01-11 | End: 2022-01-12 | Stop reason: HOSPADM

## 2022-01-11 RX ADMIN — LORAZEPAM 0.5 MG: 0.5 TABLET ORAL at 10:01

## 2022-01-11 RX ADMIN — PANTOPRAZOLE SODIUM 40 MG: 40 TABLET, DELAYED RELEASE ORAL at 09:01

## 2022-01-11 RX ADMIN — HEPARIN SODIUM 5000 UNITS: 5000 INJECTION INTRAVENOUS; SUBCUTANEOUS at 09:01

## 2022-01-11 RX ADMIN — POTASSIUM CHLORIDE, DEXTROSE MONOHYDRATE AND SODIUM CHLORIDE: 150; 5; 450 INJECTION, SOLUTION INTRAVENOUS at 02:01

## 2022-01-11 RX ADMIN — POTASSIUM CHLORIDE, DEXTROSE MONOHYDRATE AND SODIUM CHLORIDE: 150; 5; 450 INJECTION, SOLUTION INTRAVENOUS at 05:01

## 2022-01-11 RX ADMIN — LORAZEPAM 0.5 MG: 0.5 TABLET ORAL at 09:01

## 2022-01-11 RX ADMIN — HEPARIN SODIUM 5000 UNITS: 5000 INJECTION INTRAVENOUS; SUBCUTANEOUS at 05:01

## 2022-01-11 RX ADMIN — HEPARIN SODIUM 5000 UNITS: 5000 INJECTION INTRAVENOUS; SUBCUTANEOUS at 01:01

## 2022-01-11 RX ADMIN — POTASSIUM CHLORIDE, DEXTROSE MONOHYDRATE AND SODIUM CHLORIDE: 150; 5; 450 INJECTION, SOLUTION INTRAVENOUS at 09:01

## 2022-01-11 RX ADMIN — LISINOPRIL 20 MG: 20 TABLET ORAL at 09:01

## 2022-01-11 NOTE — SUBJECTIVE & OBJECTIVE
Interval History:  Patient reports having a really good night, without pain or nausea.  She continues to have diarrhea.    Medications:  Continuous Infusions:   dextrose 5 % and 0.45 % NaCl with KCl 20 mEq 125 mL/hr at 01/11/22 0953     Scheduled Meds:   barium sulfate  450 mL Oral Once    barium sulfate  450 mL Oral Once    heparin (porcine)  5,000 Units Subcutaneous Q8H    LIDOcaine (PF) 10 mg/ml (1%)  1 mL Intradermal Once    lisinopriL  20 mg Oral BID    LORazepam  0.5 mg Oral BID    pantoprazole  40 mg Oral Daily     PRN Meds:acetaminophen, benzocaine, HYDROcodone-acetaminophen, ibuprofen, LIDOcaine (PF) 10 mg/ml (1%), melatonin, morphine, ondansetron, prochlorperazine, simethicone, sodium chloride 0.9%     Review of patient's allergies indicates:  No Known Allergies  Objective:     Vital Signs (Most Recent):  Temp: 98.5 °F (36.9 °C) (01/11/22 1100)  Pulse: 80 (01/11/22 1100)  Resp: 16 (01/11/22 1100)  BP: (!) 132/96 (01/11/22 1100)  SpO2: 99 % (01/11/22 1100) Vital Signs (24h Range):  Temp:  [98.2 °F (36.8 °C)-98.7 °F (37.1 °C)] 98.5 °F (36.9 °C)  Pulse:  [80-88] 80  Resp:  [16-20] 16  SpO2:  [96 %-99 %] 99 %  BP: (131-157)/() 132/96     Weight: 72.6 kg (160 lb 0.9 oz)  Body mass index is 25.83 kg/m².    Intake/Output - Last 3 Shifts       01/09 0700  01/10 0659 01/10 0700  01/11 0659 01/11 0700 01/12 0659    Urine (mL/kg/hr)  3 (0)     Total Output  3     Net  -3            Urine Occurrence  5 x 2 x    Stool Occurrence  4 x 6 x          Physical Exam  Pulmonary:      Effort: Pulmonary effort is normal.   Abdominal:      Palpations: Abdomen is soft.   Neurological:      Mental Status: She is alert.         Significant Labs:  CBC:   Recent Labs   Lab 01/11/22  0523   WBC 5.00   RBC 3.68*   HGB 9.1*   HCT 27.7*      MCV 75.3*   MCH 24.7*   MCHC 32.9     BMP:   Recent Labs   Lab 01/11/22  0523         K 3.6      CO2 22   BUN 5*   CREATININE 0.65   CALCIUM 7.8*   MG 2.1      CMP:   Recent Labs   Lab 01/11/22  0523      CALCIUM 7.8*   ALBUMIN 2.5*   PROT 5.6*      K 3.6   CO2 22      BUN 5*   CREATININE 0.65   ALKPHOS 79   ALT 37   AST 24   BILITOT 0.3       Significant Diagnostics:  I have reviewed all pertinent imaging results/findings within the past 24 hours.  CT: I have reviewed all pertinent results/findings within the past 24 hours and my personal findings are:  no obstruction, colitis/enteritis appearance

## 2022-01-11 NOTE — PROGRESS NOTES
[facility] - [facility]  General Surgery  Progress Note    Subjective:     History of Present Illness:  46 y'o female chronic constipation previous C section hysterectomy last month per Dr. Jackson (December 8th),  reportedly had adhesions at hysterectomy unable to have a good bowel movement,  Has been several days without bowel movement, no flatus, belching yesterday abdominal cramping, bloating, n/v, ct concerning for sbo with transition point at ileum  Refusing NG tube, attempting to vomit during exam PMH:  HTN, cardiac ablation 2012 for SVT        Post-Op Info:  Procedure(s) (LRB):  LAPAROSCOPY, DIAGNOSTIC (N/A)   6 Days Post-Op     Interval History:  Patient reports having a really good night, without pain or nausea.  She continues to have diarrhea.    Medications:  Continuous Infusions:   dextrose 5 % and 0.45 % NaCl with KCl 20 mEq 125 mL/hr at 01/11/22 0953     Scheduled Meds:   barium sulfate  450 mL Oral Once    barium sulfate  450 mL Oral Once    heparin (porcine)  5,000 Units Subcutaneous Q8H    LIDOcaine (PF) 10 mg/ml (1%)  1 mL Intradermal Once    lisinopriL  20 mg Oral BID    LORazepam  0.5 mg Oral BID    pantoprazole  40 mg Oral Daily     PRN Meds:acetaminophen, benzocaine, HYDROcodone-acetaminophen, ibuprofen, LIDOcaine (PF) 10 mg/ml (1%), melatonin, morphine, ondansetron, prochlorperazine, simethicone, sodium chloride 0.9%     Review of patient's allergies indicates:  No Known Allergies  Objective:     Vital Signs (Most Recent):  Temp: 98.5 °F (36.9 °C) (01/11/22 1100)  Pulse: 80 (01/11/22 1100)  Resp: 16 (01/11/22 1100)  BP: (!) 132/96 (01/11/22 1100)  SpO2: 99 % (01/11/22 1100) Vital Signs (24h Range):  Temp:  [98.2 °F (36.8 °C)-98.7 °F (37.1 °C)] 98.5 °F (36.9 °C)  Pulse:  [80-88] 80  Resp:  [16-20] 16  SpO2:  [96 %-99 %] 99 %  BP: (131-157)/() 132/96     Weight: 72.6 kg (160 lb 0.9 oz)  Body mass index is 25.83 kg/m².    Intake/Output - Last 3 Shifts       01/09 0700  01/10  0659 01/10 0700 01/11 0659 01/11 0700 01/12 0659    Urine (mL/kg/hr)  3 (0)     Total Output  3     Net  -3            Urine Occurrence  5 x 2 x    Stool Occurrence  4 x 6 x          Physical Exam  Pulmonary:      Effort: Pulmonary effort is normal.   Abdominal:      Palpations: Abdomen is soft.   Neurological:      Mental Status: She is alert.         Significant Labs:  CBC:   Recent Labs   Lab 01/11/22 0523   WBC 5.00   RBC 3.68*   HGB 9.1*   HCT 27.7*      MCV 75.3*   MCH 24.7*   MCHC 32.9     BMP:   Recent Labs   Lab 01/11/22 0523         K 3.6      CO2 22   BUN 5*   CREATININE 0.65   CALCIUM 7.8*   MG 2.1     CMP:   Recent Labs   Lab 01/11/22 0523      CALCIUM 7.8*   ALBUMIN 2.5*   PROT 5.6*      K 3.6   CO2 22      BUN 5*   CREATININE 0.65   ALKPHOS 79   ALT 37   AST 24   BILITOT 0.3       Significant Diagnostics:  I have reviewed all pertinent imaging results/findings within the past 24 hours.  CT: I have reviewed all pertinent results/findings within the past 24 hours and my personal findings are:  no obstruction, colitis/enteritis appearance    Assessment/Plan:     * Small bowel obstruction  01/04/2021 admit, iv hydration, premedicate with morhpine and hurricaine spray attempt reinsertion ng tube, bowel rest, repeat kub am, serial exams    Explained to patient if unable to tolerate insertion of ng tube higher chance of requiring exploratory surgery patient is fully vaccinated    1/5  discussed potential for surgery, and the patient would like to proceed, as she continues to have pain, despite the nasogastric tube decompression.    Risks and benefits were discussed to include infection, bleeding, possible need to open with a lower abdominal incision, hernia, anastomotic leak if resection is required, and recurrent obstruction.  She expresses understanding wishes to proceed.    01/06/2021 s/p JUANITA laparoscopically nonbilious ngt output dc NGT, ice chips/  toradol prn headache, change ivf d51/2ns 20KCL, increase activity await ileus    01/07/2021 cl liquids as tolerate, ambulate, enc I/S    1/8  Bowel function has returned, but still c/o nausea.  Patient wishes to try solid food;  Add simethicone for gas pain.  D/c later today, if dramatically improved    1/9  Moved back to npo after emesis yesterday; now improved; will attempt solids again today, if xray looks better    01/10/2021 continue IVF replace K check C-DIFF, ct abd pelvis with oral contrast     1/11  CT looked ok; continue liquids today        Alexis Calabrese MD  General Surgery  [facility]

## 2022-01-11 NOTE — ASSESSMENT & PLAN NOTE
01/04/2021 admit, iv hydration, premedicate with morhpine and hurricaine spray attempt reinsertion ng tube, bowel rest, repeat kub am, serial exams    Explained to patient if unable to tolerate insertion of ng tube higher chance of requiring exploratory surgery patient is fully vaccinated    1/5  discussed potential for surgery, and the patient would like to proceed, as she continues to have pain, despite the nasogastric tube decompression.    Risks and benefits were discussed to include infection, bleeding, possible need to open with a lower abdominal incision, hernia, anastomotic leak if resection is required, and recurrent obstruction.  She expresses understanding wishes to proceed.    01/06/2021 s/p JUANITA laparoscopically nonbilious ngt output dc NGT, ice chips/ toradol prn headache, change ivf d51/2ns 20KCL, increase activity await ileus    01/07/2021 cl liquids as tolerate, ambulate, enc I/S    1/8  Bowel function has returned, but still c/o nausea.  Patient wishes to try solid food;  Add simethicone for gas pain.  D/c later today, if dramatically improved    1/9  Moved back to npo after emesis yesterday; now improved; will attempt solids again today, if xray looks better    01/10/2021 continue IVF replace K check C-DIFF, ct abd pelvis with oral contrast     1/11  CT looked ok; continue liquids today

## 2022-01-12 VITALS
TEMPERATURE: 98 F | RESPIRATION RATE: 18 BRPM | DIASTOLIC BLOOD PRESSURE: 95 MMHG | BODY MASS INDEX: 25.72 KG/M2 | HEIGHT: 66 IN | SYSTOLIC BLOOD PRESSURE: 139 MMHG | HEART RATE: 55 BPM | WEIGHT: 160.06 LBS | OXYGEN SATURATION: 98 %

## 2022-01-12 LAB
ANION GAP SERPL CALCULATED.3IONS-SCNC: 13 MMOL/L (ref 7–16)
BASOPHILS # BLD AUTO: 0.01 K/UL (ref 0–0.2)
BASOPHILS NFR BLD AUTO: 0.2 % (ref 0–1)
BUN SERPL-MCNC: 4 MG/DL (ref 7–18)
BUN/CREAT SERPL: 6 (ref 6–20)
CALCIUM SERPL-MCNC: 8 MG/DL (ref 8.5–10.1)
CHLORIDE SERPL-SCNC: 106 MMOL/L (ref 98–107)
CO2 SERPL-SCNC: 22 MMOL/L (ref 21–32)
CREAT SERPL-MCNC: 0.63 MG/DL (ref 0.55–1.02)
DIFFERENTIAL METHOD BLD: ABNORMAL
EOSINOPHIL # BLD AUTO: 0.21 K/UL (ref 0–0.5)
EOSINOPHIL NFR BLD AUTO: 4.1 % (ref 1–4)
ERYTHROCYTE [DISTWIDTH] IN BLOOD BY AUTOMATED COUNT: 16.1 % (ref 11.5–14.5)
FLUAV AG UPPER RESP QL IA.RAPID: NEGATIVE
FLUBV AG UPPER RESP QL IA.RAPID: NEGATIVE
GLUCOSE SERPL-MCNC: 84 MG/DL (ref 74–106)
HCT VFR BLD AUTO: 28.9 % (ref 38–47)
HGB BLD-MCNC: 9.3 G/DL (ref 12–16)
IMM GRANULOCYTES # BLD AUTO: 0.01 K/UL (ref 0–0.04)
IMM GRANULOCYTES NFR BLD: 0.2 % (ref 0–0.4)
LYMPHOCYTES # BLD AUTO: 1.47 K/UL (ref 1–4.8)
LYMPHOCYTES NFR BLD AUTO: 28.7 % (ref 27–41)
MCH RBC QN AUTO: 24.3 PG (ref 27–31)
MCHC RBC AUTO-ENTMCNC: 32.2 G/DL (ref 32–36)
MCV RBC AUTO: 75.5 FL (ref 80–96)
MONOCYTES # BLD AUTO: 0.7 K/UL (ref 0–0.8)
MONOCYTES NFR BLD AUTO: 13.6 % (ref 2–6)
MPC BLD CALC-MCNC: 10.8 FL (ref 9.4–12.4)
NEUTROPHILS # BLD AUTO: 2.73 K/UL (ref 1.8–7.7)
NEUTROPHILS NFR BLD AUTO: 53.2 % (ref 53–65)
NRBC # BLD AUTO: 0 X10E3/UL
NRBC, AUTO (.00): 0 %
O+P STL CONC: NORMAL
PLATELET # BLD AUTO: 210 K/UL (ref 150–400)
POTASSIUM SERPL-SCNC: 3.8 MMOL/L (ref 3.5–5.1)
RBC # BLD AUTO: 3.83 M/UL (ref 4.2–5.4)
SARS-COV+SARS-COV-2 AG RESP QL IA.RAPID: NEGATIVE
SODIUM SERPL-SCNC: 137 MMOL/L (ref 136–145)
TRICHROME SMEAR: NORMAL
WBC # BLD AUTO: 5.13 K/UL (ref 4.5–11)

## 2022-01-12 PROCEDURE — 36415 COLL VENOUS BLD VENIPUNCTURE: CPT | Performed by: NURSE PRACTITIONER

## 2022-01-12 PROCEDURE — 94761 N-INVAS EAR/PLS OXIMETRY MLT: CPT

## 2022-01-12 PROCEDURE — 85025 COMPLETE CBC W/AUTO DIFF WBC: CPT | Performed by: NURSE PRACTITIONER

## 2022-01-12 PROCEDURE — 87428 SARSCOV & INF VIR A&B AG IA: CPT | Performed by: NURSE PRACTITIONER

## 2022-01-12 PROCEDURE — 63600175 PHARM REV CODE 636 W HCPCS: Performed by: NURSE PRACTITIONER

## 2022-01-12 PROCEDURE — 25000003 PHARM REV CODE 250: Performed by: NURSE PRACTITIONER

## 2022-01-12 PROCEDURE — 25000003 PHARM REV CODE 250: Performed by: SURGERY

## 2022-01-12 PROCEDURE — 80048 BASIC METABOLIC PNL TOTAL CA: CPT | Performed by: NURSE PRACTITIONER

## 2022-01-12 PROCEDURE — 99900035 HC TECH TIME PER 15 MIN (STAT)

## 2022-01-12 RX ORDER — HYDROCODONE BITARTRATE AND ACETAMINOPHEN 7.5; 325 MG/1; MG/1
1 TABLET ORAL EVERY 6 HOURS PRN
Qty: 15 TABLET | Refills: 0 | Status: SHIPPED | OUTPATIENT
Start: 2022-01-12 | End: 2022-05-11

## 2022-01-12 RX ORDER — LOPERAMIDE HYDROCHLORIDE 2 MG/1
2 CAPSULE ORAL 4 TIMES DAILY PRN
Qty: 15 CAPSULE | Refills: 0 | Status: SHIPPED | OUTPATIENT
Start: 2022-01-12 | End: 2022-01-22

## 2022-01-12 RX ORDER — LOPERAMIDE HYDROCHLORIDE 2 MG/1
2 CAPSULE ORAL 4 TIMES DAILY PRN
Status: DISCONTINUED | OUTPATIENT
Start: 2022-01-12 | End: 2022-01-12 | Stop reason: HOSPADM

## 2022-01-12 RX ADMIN — PANTOPRAZOLE SODIUM 40 MG: 40 TABLET, DELAYED RELEASE ORAL at 09:01

## 2022-01-12 RX ADMIN — LORAZEPAM 0.5 MG: 0.5 TABLET ORAL at 09:01

## 2022-01-12 RX ADMIN — HEPARIN SODIUM 5000 UNITS: 5000 INJECTION INTRAVENOUS; SUBCUTANEOUS at 05:01

## 2022-01-12 RX ADMIN — LISINOPRIL 20 MG: 20 TABLET ORAL at 09:01

## 2022-01-12 NOTE — PROGRESS NOTES
[facility] - Orthopedic  General Surgery  Progress Note    Subjective: feels better     History of Present Illness:  46 y'o female chronic constipation previous C section hysterectomy last month per Dr. Jackson (December 8th),  reportedly had adhesions at hysterectomy unable to have a good bowel movement,  Has been several days without bowel movement, no flatus, belching yesterday abdominal cramping, bloating, n/v, ct concerning for sbo with transition point at ileum  Refusing NG tube, attempting to vomit during exam PMH:  HTN, cardiac ablation 2012 for SVT        Post-Op Info:  Procedure(s) (LRB):  LAPAROSCOPY, DIAGNOSTIC (N/A)   7 Days Post-Op     Interval History: no further n/v, diarrhea, negative stool studies     Medications:  Continuous Infusions:  Scheduled Meds:   barium sulfate  450 mL Oral Once    barium sulfate  450 mL Oral Once    heparin (porcine)  5,000 Units Subcutaneous Q8H    LIDOcaine (PF) 10 mg/ml (1%)  1 mL Intradermal Once    lisinopriL  20 mg Oral BID    LORazepam  0.5 mg Oral BID    pantoprazole  40 mg Oral Daily     PRN Meds:acetaminophen, benzocaine, HYDROcodone-acetaminophen, ibuprofen, LIDOcaine (PF) 10 mg/ml (1%), loperamide, melatonin, morphine, ondansetron, prochlorperazine, simethicone, sodium chloride 0.9%     Review of patient's allergies indicates:  No Known Allergies  Objective:     Vital Signs (Most Recent):  Temp: 98.3 °F (36.8 °C) (01/12/22 0435)  Pulse: 74 (01/12/22 0435)  Resp: 18 (01/12/22 0435)  BP: 124/86 (01/12/22 0435)  SpO2: 99 % (01/12/22 0435) Vital Signs (24h Range):  Temp:  [98 °F (36.7 °C)-98.5 °F (36.9 °C)] 98.3 °F (36.8 °C)  Pulse:  [74-87] 74  Resp:  [16-18] 18  SpO2:  [98 %-99 %] 99 %  BP: (124-162)/(82-96) 124/86     Weight: 72.6 kg (160 lb 0.9 oz)  Body mass index is 25.83 kg/m².    Intake/Output - Last 3 Shifts       01/10 0700  01/11 0659 01/11 0700  01/12 0659 01/12 0700  01/13 0659    P.O.  360     Total Intake(mL/kg)  360 (5)     Urine  (mL/kg/hr) 3 (0)      Total Output 3      Net -3 +360            Urine Occurrence 5 x 5 x     Stool Occurrence 4 x 8 x           Physical Exam  Vitals and nursing note reviewed.   HENT:      Head: Normocephalic.      Mouth/Throat:      Mouth: Mucous membranes are dry.   Eyes:      Conjunctiva/sclera: Conjunctivae normal.   Cardiovascular:      Rate and Rhythm: Normal rate.      Pulses: Normal pulses.   Pulmonary:      Effort: Pulmonary effort is normal.      Breath sounds: Normal breath sounds.   Abdominal:      Palpations: Abdomen is soft.      Comments: abd dsgs c/d/i  Hyperactive bowel sounds   Skin:     General: Skin is warm and dry.      Capillary Refill: Capillary refill takes less than 2 seconds.   Neurological:      General: No focal deficit present.      Mental Status: She is alert.   Psychiatric:         Mood and Affect: Mood normal.         Significant Labs:  I have reviewed all pertinent lab results within the past 24 hours.  CBC:   Recent Labs   Lab 01/12/22  0425   WBC 5.13   RBC 3.83*   HGB 9.3*   HCT 28.9*      MCV 75.5*   MCH 24.3*   MCHC 32.2     BMP:   Recent Labs   Lab 01/11/22  0523 01/11/22  0523 01/12/22  0425      < > 84      < > 137   K 3.6   < > 3.8      < > 106   CO2 22   < > 22   BUN 5*   < > 4*   CREATININE 0.65   < > 0.63   CALCIUM 7.8*   < > 8.0*   MG 2.1  --   --     < > = values in this interval not displayed.       Significant Diagnostics:  I have reviewed all pertinent imaging results/findings within the past 24 hours.    Assessment/Plan:     * Small bowel obstruction  01/04/2021 admit, iv hydration, premedicate with morhpine and hurricaine spray attempt reinsertion ng tube, bowel rest, repeat kub am, serial exams    Explained to patient if unable to tolerate insertion of ng tube higher chance of requiring exploratory surgery patient is fully vaccinated    1/5  discussed potential for surgery, and the patient would like to proceed, as she continues to have  pain, despite the nasogastric tube decompression.    Risks and benefits were discussed to include infection, bleeding, possible need to open with a lower abdominal incision, hernia, anastomotic leak if resection is required, and recurrent obstruction.  She expresses understanding wishes to proceed.    01/06/2021 s/p JUANITA laparoscopically nonbilious ngt output dc NGT, ice chips/ toradol prn headache, change ivf d51/2ns 20KCL, increase activity await ileus    01/07/2021 cl liquids as tolerate, ambulate, enc I/S    1/8  Bowel function has returned, but still c/o nausea.  Patient wishes to try solid food;  Add simethicone for gas pain.  D/c later today, if dramatically improved    1/9  Moved back to npo after emesis yesterday; now improved; will attempt solids again today, if xray looks better    01/10/2021 continue IVF replace K check C-DIFF, ct abd pelvis with oral contrast     1/11  CT looked ok; continue liquids today    01/12/2021 advance diet, imodium prn, dc ivf, covid flu swab        Raine Pratt, ACNP  General Surgery  [facility] - [facility]

## 2022-01-12 NOTE — DISCHARGE SUMMARY
[facility] - Orth[facility]  General Surgery  Discharge Summary      Patient Name: Josefina Izquierdo  MRN: [id number]  Admission Date: 1/4/2022  Hospital Length of Stay: 8 days  Discharge Date and Time:  01/12/2022 2:36 PM  Attending Physician: Alexis Calabrese MD   Discharging Provider: LISS Townsend  Primary Care Provider: Mark Dean MD    HPI:   46 y'o female chronic constipation previous C section hysterectomy last month per Dr. Jackson (December 8th),  reportedly had adhesions at hysterectomy unable to have a good bowel movement,  Has been several days without bowel movement, no flatus, belching yesterday abdominal cramping, bloating, n/v, ct concerning for sbo with transition point at ileum  Refusing NG tube, attempting to vomit during exam PMH:  HTN, cardiac ablation 2012 for SVT        Procedure(s) (LRB):  LAPAROSCOPY, DIAGNOSTIC (N/A)  Lysis of adhesions to relieve sbo     Indwelling Lines/Drains at time of discharge:   Lines/Drains/Airways     None               Hospital Course: No notes on file  Post op ileus resolved, stool studies negative, tolerating diet, pain improved, no further n/v   Goals of Care Treatment Preferences:  Code Status: Full Code      Consults:     Significant Diagnostic Studies:   Specimen (24h ago, onward)            None          Pending Diagnostic Studies:     Procedure Component Value Units Date/Time    EXTRA TUBES [[id number]] Collected: 01/04/22 1033    Order Status: Sent Lab Status: In process Updated: 01/04/22 1036    Specimen: Blood, Venous     Narrative:      The following orders were created for panel order EXTRA TUBES.  Procedure                               Abnormality         Status                     ---------                               -----------         ------                     Light Blue Top Hold[[id number]]                              In process                   Please view results for these tests on the individual orders.        Final Active  Thank you for choosing Aliyah Shaw MD at [place] [id number]  To Do: [facility]  1. Please take meds as directed. [facility] is located in [address]. Monday, Tuesday & Friday – 8 a.m. to 4 p.m.   Wednesday, Thursday – 7 a.m. to Diagnoses:    Diagnosis Date Noted POA    PRINCIPAL PROBLEM:  Small bowel obstruction [K56.609] 01/04/2022 Yes      Problems Resolved During this Admission:      Discharged Condition: good    Disposition: Home or Self Care    Follow Up:   Follow-up Information     Alexis Calabrese MD. Call in 10 days.    Specialties: General Surgery, Surgery  Why: post op f/u  Contact information:  [address] [place] [address]  [place] [address]  [phone]                       Patient Instructions:      Diet Adult Regular     Lifting restrictions     No driving until:     Notify your health care provider if you experience any of the following:  temperature >100.4     Notify your health care provider if you experience any of the following:  persistent nausea and vomiting or diarrhea     Notify your health care provider if you experience any of the following:  redness, tenderness, or signs of infection (pain, swelling, redness, odor or green/yellow discharge around incision site)     Remove dressing in 24 hours     Activity as tolerated     Shower on day dressing removed (No bath)     Medications:  Reconciled Home Medications:      Medication List      START taking these medications    HYDROcodone-acetaminophen 7.5-325 mg per tablet  Commonly known as: NORCO  Take 1 tablet by mouth every 6 (six) hours as needed.     loperamide 2 mg capsule  Commonly known as: IMODIUM  Take 1 capsule (2 mg total) by mouth 4 (four) times daily as needed for Diarrhea.        CONTINUE taking these medications    ascorbic acid (vitamin C) 1,000 mg Tbsr  Take 1,000 mg by mouth 2 (two) times a day.     lisinopriL 20 MG tablet  Commonly known as: PRINIVIL,ZESTRIL  Take 20 mg by mouth 2 (two) times daily.     traMADoL 50 mg tablet  Commonly known as: ULTRAM  Take 1 tablet (50 mg total) by mouth every 6 (six) hours as needed for Pain.          Time spent on the discharge of patient: 20 minutes    LISS Townsend  General Surgery  [facility]   outweigh those potential risks and we strive to make you healthier and to improve your quality of life.     Referrals, and Radiology Information:    If your insurance requires a referral to a specialist, please allow 5 business days to process your referral body, separate you from a loved one, or lose your job. The symptoms of anxiety can be physical and mental.  How does it feel?   At certain times, people with anxiety may have:  · Dizziness  · Muscle tension or pain  · Restlessness  · Sleeplessness  · Troubl wearing you down, here are some things you can do to cope:  · Keep in mind that you can’t control everything about a situation. Change what you can and let the rest take its course.   · Exercise—it’s a great way to relieve tension and help your body feel re

## 2022-01-12 NOTE — SUBJECTIVE & OBJECTIVE
Interval History: no further n/v, diarrhea, negative stool studies     Medications:  Continuous Infusions:  Scheduled Meds:   barium sulfate  450 mL Oral Once    barium sulfate  450 mL Oral Once    heparin (porcine)  5,000 Units Subcutaneous Q8H    LIDOcaine (PF) 10 mg/ml (1%)  1 mL Intradermal Once    lisinopriL  20 mg Oral BID    LORazepam  0.5 mg Oral BID    pantoprazole  40 mg Oral Daily     PRN Meds:acetaminophen, benzocaine, HYDROcodone-acetaminophen, ibuprofen, LIDOcaine (PF) 10 mg/ml (1%), loperamide, melatonin, morphine, ondansetron, prochlorperazine, simethicone, sodium chloride 0.9%     Review of patient's allergies indicates:  No Known Allergies  Objective:     Vital Signs (Most Recent):  Temp: 98.3 °F (36.8 °C) (01/12/22 0435)  Pulse: 74 (01/12/22 0435)  Resp: 18 (01/12/22 0435)  BP: 124/86 (01/12/22 0435)  SpO2: 99 % (01/12/22 0435) Vital Signs (24h Range):  Temp:  [98 °F (36.7 °C)-98.5 °F (36.9 °C)] 98.3 °F (36.8 °C)  Pulse:  [74-87] 74  Resp:  [16-18] 18  SpO2:  [98 %-99 %] 99 %  BP: (124-162)/(82-96) 124/86     Weight: 72.6 kg (160 lb 0.9 oz)  Body mass index is 25.83 kg/m².    Intake/Output - Last 3 Shifts       01/10 0700  01/11 0659 01/11 0700  01/12 0659 01/12 0700  01/13 0659    P.O.  360     Total Intake(mL/kg)  360 (5)     Urine (mL/kg/hr) 3 (0)      Total Output 3      Net -3 +360            Urine Occurrence 5 x 5 x     Stool Occurrence 4 x 8 x           Physical Exam  Vitals and nursing note reviewed.   HENT:      Head: Normocephalic.      Mouth/Throat:      Mouth: Mucous membranes are dry.   Eyes:      Conjunctiva/sclera: Conjunctivae normal.   Cardiovascular:      Rate and Rhythm: Normal rate.      Pulses: Normal pulses.   Pulmonary:      Effort: Pulmonary effort is normal.      Breath sounds: Normal breath sounds.   Abdominal:      Palpations: Abdomen is soft.      Comments: abd dsgs c/d/i  Hyperactive bowel sounds   Skin:     General: Skin is warm and dry.      Capillary Refill:  Capillary refill takes less than 2 seconds.   Neurological:      General: No focal deficit present.      Mental Status: She is alert.   Psychiatric:         Mood and Affect: Mood normal.         Significant Labs:  I have reviewed all pertinent lab results within the past 24 hours.  CBC:   Recent Labs   Lab 01/12/22 0425   WBC 5.13   RBC 3.83*   HGB 9.3*   HCT 28.9*      MCV 75.5*   MCH 24.3*   MCHC 32.2     BMP:   Recent Labs   Lab 01/11/22  0523 01/11/22  0523 01/12/22 0425      < > 84      < > 137   K 3.6   < > 3.8      < > 106   CO2 22   < > 22   BUN 5*   < > 4*   CREATININE 0.65   < > 0.63   CALCIUM 7.8*   < > 8.0*   MG 2.1  --   --     < > = values in this interval not displayed.       Significant Diagnostics:  I have reviewed all pertinent imaging results/findings within the past 24 hours.

## 2022-01-12 NOTE — ASSESSMENT & PLAN NOTE
01/04/2021 admit, iv hydration, premedicate with morhpine and hurricaine spray attempt reinsertion ng tube, bowel rest, repeat kub am, serial exams    Explained to patient if unable to tolerate insertion of ng tube higher chance of requiring exploratory surgery patient is fully vaccinated    1/5  discussed potential for surgery, and the patient would like to proceed, as she continues to have pain, despite the nasogastric tube decompression.    Risks and benefits were discussed to include infection, bleeding, possible need to open with a lower abdominal incision, hernia, anastomotic leak if resection is required, and recurrent obstruction.  She expresses understanding wishes to proceed.    01/06/2021 s/p JUANITA laparoscopically nonbilious ngt output dc NGT, ice chips/ toradol prn headache, change ivf d51/2ns 20KCL, increase activity await ileus    01/07/2021 cl liquids as tolerate, ambulate, enc I/S    1/8  Bowel function has returned, but still c/o nausea.  Patient wishes to try solid food;  Add simethicone for gas pain.  D/c later today, if dramatically improved    1/9  Moved back to npo after emesis yesterday; now improved; will attempt solids again today, if xray looks better    01/10/2021 continue IVF replace K check C-DIFF, ct abd pelvis with oral contrast     1/11  CT looked ok; continue liquids today    01/12/2021 advance diet, imodium prn, dc ivf, covid flu swab

## 2022-01-21 ENCOUNTER — OFFICE VISIT (OUTPATIENT)
Dept: SURGERY | Facility: CLINIC | Age: 47
End: 2022-01-21
Attending: SURGERY
Payer: MEDICAID

## 2022-01-21 VITALS — BODY MASS INDEX: 25.39 KG/M2 | HEIGHT: 66 IN | WEIGHT: 158 LBS

## 2022-01-21 DIAGNOSIS — R68.81 EARLY SATIETY: Primary | ICD-10-CM

## 2022-01-21 DIAGNOSIS — Z09 POSTOP CHECK: ICD-10-CM

## 2022-01-21 DIAGNOSIS — K21.00 GASTROESOPHAGEAL REFLUX DISEASE WITH ESOPHAGITIS, UNSPECIFIED WHETHER HEMORRHAGE: ICD-10-CM

## 2022-01-21 PROCEDURE — 3008F BODY MASS INDEX DOCD: CPT | Mod: CPTII,,, | Performed by: SURGERY

## 2022-01-21 PROCEDURE — 1159F MED LIST DOCD IN RCRD: CPT | Mod: CPTII,,, | Performed by: SURGERY

## 2022-01-21 PROCEDURE — 99213 OFFICE O/P EST LOW 20 MIN: CPT | Mod: PBBFAC | Performed by: SURGERY

## 2022-01-21 PROCEDURE — 3008F PR BODY MASS INDEX (BMI) DOCUMENTED: ICD-10-PCS | Mod: CPTII,,, | Performed by: SURGERY

## 2022-01-21 PROCEDURE — 99024 POSTOP FOLLOW-UP VISIT: CPT | Mod: ,,, | Performed by: SURGERY

## 2022-01-21 PROCEDURE — 99024 PR POST-OP FOLLOW-UP VISIT: ICD-10-PCS | Mod: ,,, | Performed by: SURGERY

## 2022-01-21 PROCEDURE — 1159F PR MEDICATION LIST DOCUMENTED IN MEDICAL RECORD: ICD-10-PCS | Mod: CPTII,,, | Performed by: SURGERY

## 2022-01-21 NOTE — PROGRESS NOTES
"Subjective:       Patient ID: Josefina Izquierdo is a 46 y.o. female.    Chief Complaint: Post-op Evaluation    Patient is here for follow-up after recent admission and surgery for small bowel obstruction.  She had laparoscopic lysis of adhesions and significant and ileus postoperatively.  She also had diarrhea postoperatively.  The diarrhea has now improved, and she is experiencing difficulty with constipation.  She has been taking MiraLax up which has been mildly effective.      family history includes Colon cancer in her mother; Heart disease in her father and paternal grandmother; Hypertension in her father; Lung cancer in her mother.  Past Medical History:   Diagnosis Date    H/O atrioventricular jocy ablation     Hypertension       Past Surgical History:   Procedure Laterality Date    AUGMENTATION OF BREAST       SECTION      DIAGNOSTIC LAPAROSCOPY N/A 2022    Procedure: LAPAROSCOPY, DIAGNOSTIC;  Surgeon: Alexis Calabrese MD;  Location: [facility];  Service: General;  Laterality: N/A;  LYSIS OF ADHESIONS    LEFT ARM SURGERY      LEFT LEG SURGERY      JOSE AND SCREWS    ROBOT-ASSISTED LAPAROSCOPIC HYSTERECTOMY N/A 2021    Procedure: ROBOTIC HYSTERECTOMY,POSS BSO,CYSTO ;  Surgeon: Young Jackson MD;  Location: [facility];  Service: OB/GYN;  Laterality: N/A;    ROBOT-ASSISTED LAPAROSCOPIC SALPINGO-OOPHORECTOMY Left 2021    Procedure: ROBOTIC SALPINGO-OOPHORECTOMY;  Surgeon: Young Jackson MD;  Location: [facility];  Service: OB/GYN;  Laterality: Left;    TUBAL LIGATION      VAGINAL DELIVERY      X 2       reports that she has never smoked. She has never used smokeless tobacco. She reports previous alcohol use. She reports that she does not use drugs.     Review of Systems   All other systems reviewed and are negative.         Objective:      Ht 5' 6" (1.676 m)   Wt 71.7 kg (158 lb)   LMP 2021   BMI 25.50 kg/m²    Physical Exam  Cardiovascular:      Rate and " Rhythm: Normal rate.   Pulmonary:      Effort: No respiratory distress.      Breath sounds: Normal breath sounds.   Skin:     Comments: Healing well   Neurological:      Mental Status: She is alert.           Assessment/Plan:         Early satiety  -     X-Ray Abdomen Flat And Erect; Future; Expected date: 01/24/2022    Gastroesophageal reflux disease with esophagitis, unspecified whether hemorrhage  -     X-Ray Abdomen Flat And Erect; Future; Expected date: 01/24/2022    Postop check         Problem List Items Addressed This Visit        Other    Postop check    Overview     Currently with constipation, early satiety, reflux symptoms         Current Assessment & Plan     Has been taking miralax, but will try mag citrate.    Will get Flat/upright, on Monday, if still having trouble after the weekend.           Other Visit Diagnoses     Early satiety    -  Primary    Relevant Orders    X-Ray Abdomen Flat And Erect    Gastroesophageal reflux disease with esophagitis, unspecified whether hemorrhage        Relevant Orders    X-Ray Abdomen Flat And Erect

## 2022-01-21 NOTE — ASSESSMENT & PLAN NOTE
Has been taking miralax, but will try mag citrate.    Will get Flat/upright, on Monday, if still having trouble after the weekend.

## 2022-01-24 ENCOUNTER — HOSPITAL ENCOUNTER (OUTPATIENT)
Dept: RADIOLOGY | Facility: HOSPITAL | Age: 47
Discharge: HOME | End: 2022-01-24
Attending: SURGERY
Payer: MEDICAID

## 2022-01-24 DIAGNOSIS — R68.81 EARLY SATIETY: ICD-10-CM

## 2022-01-24 DIAGNOSIS — K21.00 GASTROESOPHAGEAL REFLUX DISEASE WITH ESOPHAGITIS, UNSPECIFIED WHETHER HEMORRHAGE: ICD-10-CM

## 2022-01-24 PROCEDURE — 74019 XR ABDOMEN FLAT AND ERECT: ICD-10-PCS | Mod: 26,,, | Performed by: RADIOLOGY

## 2022-01-24 PROCEDURE — 74019 RADEX ABDOMEN 2 VIEWS: CPT | Mod: TC

## 2022-01-24 PROCEDURE — 74019 RADEX ABDOMEN 2 VIEWS: CPT | Mod: 26,,, | Performed by: RADIOLOGY

## 2022-04-12 LAB — CRC RECOMMENDATION EXT: NORMAL

## 2022-04-25 PROBLEM — Z09 POSTOP CHECK: Status: RESOLVED | Noted: 2022-01-21 | Resolved: 2022-04-25

## 2022-05-11 ENCOUNTER — APPOINTMENT (OUTPATIENT)
Dept: RADIOLOGY | Facility: CLINIC | Age: 47
End: 2022-05-11
Attending: NURSE PRACTITIONER
Payer: MEDICAID

## 2022-05-11 ENCOUNTER — OFFICE VISIT (OUTPATIENT)
Dept: FAMILY MEDICINE | Facility: CLINIC | Age: 47
End: 2022-05-11
Payer: MEDICAID

## 2022-05-11 VITALS
OXYGEN SATURATION: 98 % | HEART RATE: 82 BPM | SYSTOLIC BLOOD PRESSURE: 120 MMHG | HEIGHT: 66 IN | RESPIRATION RATE: 18 BRPM | TEMPERATURE: 98 F | DIASTOLIC BLOOD PRESSURE: 80 MMHG | BODY MASS INDEX: 27.06 KG/M2 | WEIGHT: 168.38 LBS

## 2022-05-11 DIAGNOSIS — N94.10 DYSPAREUNIA IN FEMALE: ICD-10-CM

## 2022-05-11 DIAGNOSIS — R10.31 RIGHT LOWER QUADRANT ABDOMINAL PAIN: ICD-10-CM

## 2022-05-11 DIAGNOSIS — K59.09 OTHER CONSTIPATION: ICD-10-CM

## 2022-05-11 DIAGNOSIS — R39.15 URINARY URGENCY: Primary | ICD-10-CM

## 2022-05-11 DIAGNOSIS — N39.46 MIXED STRESS AND URGE INCONTINENCE: ICD-10-CM

## 2022-05-11 DIAGNOSIS — N30.90 CYSTITIS: ICD-10-CM

## 2022-05-11 PROBLEM — K56.609 SMALL BOWEL OBSTRUCTION: Status: RESOLVED | Noted: 2022-01-04 | Resolved: 2022-05-11

## 2022-05-11 LAB
BACTERIA #/AREA URNS HPF: ABNORMAL /HPF
BILIRUB SERPL-MCNC: ABNORMAL MG/DL
BILIRUB UR QL STRIP: NEGATIVE
BLOOD URINE, POC: ABNORMAL
CLARITY UR: CLEAR
COLOR UR: YELLOW
COLOR, POC UA: YELLOW
GLUCOSE UR QL STRIP: ABNORMAL
GLUCOSE UR STRIP-MCNC: NEGATIVE MG/DL
KETONES UR QL STRIP: ABNORMAL
KETONES UR STRIP-SCNC: NEGATIVE MG/DL
LEUKOCYTE ESTERASE UR QL STRIP: NEGATIVE
LEUKOCYTE ESTERASE URINE, POC: ABNORMAL
MUCOUS THREADS #/AREA URNS HPF: ABNORMAL /HPF
NITRITE UR QL STRIP: NEGATIVE
NITRITE, POC UA: ABNORMAL
PH UR STRIP: 6 PH UNITS
PH, POC UA: 6
PROT UR QL STRIP: NEGATIVE
PROTEIN, POC: ABNORMAL
RBC # UR STRIP: ABNORMAL /UL
RBC #/AREA URNS HPF: ABNORMAL /HPF
SP GR UR STRIP: 1.02
SPECIFIC GRAVITY, POC UA: 1.02
SQUAMOUS #/AREA URNS LPF: ABNORMAL /LPF
UROBILINOGEN UR STRIP-ACNC: 0.2 MG/DL
UROBILINOGEN, POC UA: 0.2
WBC #/AREA URNS HPF: ABNORMAL /HPF

## 2022-05-11 PROCEDURE — 81001 URINALYSIS, REFLEX TO URINE CULTURE: ICD-10-PCS | Mod: ,,, | Performed by: CLINICAL MEDICAL LABORATORY

## 2022-05-11 PROCEDURE — 99213 PR OFFICE/OUTPT VISIT, EST, LEVL III, 20-29 MIN: ICD-10-PCS | Mod: ,,, | Performed by: NURSE PRACTITIONER

## 2022-05-11 PROCEDURE — 81001 URINALYSIS AUTO W/SCOPE: CPT | Mod: ,,, | Performed by: CLINICAL MEDICAL LABORATORY

## 2022-05-11 PROCEDURE — 74018 RADEX ABDOMEN 1 VIEW: CPT | Mod: TC,RHCUB,FY | Performed by: NURSE PRACTITIONER

## 2022-05-11 PROCEDURE — 99213 OFFICE O/P EST LOW 20 MIN: CPT | Mod: ,,, | Performed by: NURSE PRACTITIONER

## 2022-05-11 PROCEDURE — 81003 URINALYSIS AUTO W/O SCOPE: CPT | Mod: QW,,, | Performed by: NURSE PRACTITIONER

## 2022-05-11 PROCEDURE — 81003 POCT URINALYSIS W/O SCOPE: ICD-10-PCS | Mod: QW,,, | Performed by: NURSE PRACTITIONER

## 2022-05-11 RX ORDER — NITROFURANTOIN 25; 75 MG/1; MG/1
100 CAPSULE ORAL 2 TIMES DAILY
Qty: 14 CAPSULE | Refills: 0 | Status: SHIPPED | OUTPATIENT
Start: 2022-05-11 | End: 2022-05-18

## 2022-05-11 RX ORDER — PANTOPRAZOLE SODIUM 40 MG/1
40 TABLET, DELAYED RELEASE ORAL 2 TIMES DAILY
COMMUNITY
Start: 2022-04-06 | End: 2022-12-14

## 2022-05-11 NOTE — PROGRESS NOTES
RICHELLE DIALLO [facility] FAMILY MEDICINE       PATIENT NAME: Josefina Izquierdo   : 1975    AGE: 46 y.o. DATE: 2022    MRN: [id number]        Reason for Visit / Chief Complaint: Abdominal Pain and Low-back Pain (Room 2// Hysterectomy in December with bowel obstr post-op. Since Saturday has had low back pain, abd pain, bloating, and states she feels like she won't make it to the bathroom when she needs to urinate.)     Subjective:     Presents as a walk-in c/o right low back and lower abd pain x 4 days.   Probs with bladder since hysterectomy. Urgency of urination. No burning of urination but pain increases when she urinates. Difficulty holding urine. Pain with sex.   Last BM this am but has to take something for sluggish bowels.    Hysterectomy per Dr. Jackson in Dec then Dr. Calabrese surgery in  for bowel blockage, said lots of scar tissue    Had EGD and colonoscopy per Dr. Zhao within the past month.    Review of Systems:     Review of Systems   Constitutional: Negative.    HENT: Negative.    Respiratory: Negative.    Cardiovascular: Negative.    Gastrointestinal: Negative.    Genitourinary: Positive for frequency and urgency. Negative for dysuria.   Musculoskeletal: Positive for back pain.   Skin: Negative.    Neurological: Negative.    Psychiatric/Behavioral: Negative.        Allergies and Medications:   Review of patient's allergies indicates:  No Known Allergies     Current Outpatient Medications on File Prior to Visit   Medication Sig Dispense Refill    ascorbic acid, vitamin C, 1,000 mg TbSR Take 1,000 mg by mouth 2 (two) times a day. 5 each 0    lisinopriL (PRINIVIL,ZESTRIL) 20 MG tablet Take 20 mg by mouth 2 (two) times daily.      pantoprazole (PROTONIX) 40 MG tablet Take 40 mg by mouth 2 (two) times daily.      [DISCONTINUED] HYDROcodone-acetaminophen (NORCO) 7.5-325 mg per tablet Take 1 tablet by mouth every 6 (six) hours as needed. (Patient not  taking: Reported on 2022) 15 tablet 0    [DISCONTINUED] traMADoL (ULTRAM) 50 mg tablet Take 1 tablet (50 mg total) by mouth every 6 (six) hours as needed for Pain. (Patient not taking: Reported on 2022) 10 tablet 0     No current facility-administered medications on file prior to visit.       Medical/Social/Family History:     Past Medical History:   Diagnosis Date    H/O atrioventricular jocy ablation     Hypertension     Small bowel obstruction 2022      Social History     Tobacco Use   Smoking Status Never Smoker   Smokeless Tobacco Never Used      Social History     Substance and Sexual Activity   Alcohol Use Not Currently       Family History   Problem Relation Age of Onset    Colon cancer Mother     Lung cancer Mother     Heart disease Paternal Grandmother     Hypertension Father     Heart disease Father       Past Surgical History:   Procedure Laterality Date    AUGMENTATION OF BREAST      CARDIAC ELECTROPHYSIOLOGY MAPPING AND ABLATION       SECTION      DIAGNOSTIC LAPAROSCOPY N/A 2022    Procedure: LAPAROSCOPY, DIAGNOSTIC;  Surgeon: Alexis Calabrese MD;  Location: [facility] OR;  Service: General;  Laterality: N/A;  LYSIS OF ADHESIONS    LEFT ARM SURGERY      LEFT LEG SURGERY      JOSE AND SCREWS    ROBOT-ASSISTED LAPAROSCOPIC HYSTERECTOMY N/A 2021    Procedure: ROBOTIC HYSTERECTOMY,POSS BSO,CYSTO ;  Surgeon: Young Jackson MD;  Location: [facility] OR;  Service: OB/GYN;  Laterality: N/A;    ROBOT-ASSISTED LAPAROSCOPIC SALPINGO-OOPHORECTOMY Left 2021    Procedure: ROBOTIC SALPINGO-OOPHORECTOMY;  Surgeon: Young Jackson MD;  Location: [facility];  Service: OB/GYN;  Laterality: Left;    TUBAL LIGATION      VAGINAL DELIVERY      X 2        There is no immunization history on file for this patient.       Objective:     Wt Readings from Last 3 Encounters:   22 0912 76.4 kg (168 lb 6.4 oz)   22 1006 71.7 kg (158 lb)   22 1853  "72.6 kg (160 lb 0.9 oz)   01/04/22 0941 72.6 kg (160 lb)       Vitals:    05/11/22 0912   BP: 120/80   Pulse: 82   Resp: 18   Temp: 98 °F (36.7 °C)   TempSrc: Tympanic   SpO2: 98%   Weight: 76.4 kg (168 lb 6.4 oz)   Height: 5' 6" (1.676 m)     Body mass index is 27.18 kg/m².     Physical Exam:    Physical Exam  Vitals and nursing note reviewed.   Constitutional:       Appearance: Normal appearance.   HENT:      Head: Normocephalic.   Eyes:      Conjunctiva/sclera: Conjunctivae normal.   Cardiovascular:      Rate and Rhythm: Normal rate and regular rhythm.      Heart sounds: Normal heart sounds.   Pulmonary:      Effort: Pulmonary effort is normal.      Breath sounds: Normal breath sounds.   Abdominal:      General: Bowel sounds are normal.      Palpations: Abdomen is soft.      Tenderness: There is abdominal tenderness. There is no guarding.   Lymphadenopathy:      Cervical: No cervical adenopathy.   Skin:     General: Skin is warm and dry.   Neurological:      Mental Status: She is alert and oriented to person, place, and time.         Assessment:          ICD-10-CM ICD-9-CM   1. Urinary urgency  R39.15 788.63   2. Right lower quadrant abdominal pain  R10.31 789.03   3. Other constipation  K59.09 564.09   4. Cystitis  N30.90 595.9   5. Dyspareunia in female  N94.10 625.0   6. Mixed stress and urge incontinence  N39.46 788.33        Plan:       KUB normal  Urine culture pending; start macrobid for cystitis while pending due to results will return when clinic is closed for weekend  Refer to Dr. Leandro Black for bladder issues and painful intercourse x 5 mths.    Urinary urgency  -     POCT URINALYSIS W/O SCOPE  -     Urinalysis, Reflex to Urine Culture; Future; Expected date: 05/11/2022  -     Ambulatory referral/consult to Urogynecology; Future; Expected date: 05/18/2022    Right lower quadrant abdominal pain  -     POCT URINALYSIS W/O SCOPE  -     X-Ray Abdomen AP 1 View; Future; Expected date: 05/11/2022  -     " Urinalysis, Reflex to Urine Culture; Future; Expected date: 05/11/2022    Other constipation  Comments:  since bowel obstruction in Dec 2021  Orders:  -     X-Ray Abdomen AP 1 View; Future; Expected date: 05/11/2022    Cystitis  -     nitrofurantoin, macrocrystal-monohydrate, (MACROBID) 100 MG capsule; Take 1 capsule (100 mg total) by mouth 2 (two) times daily. for 7 days  Dispense: 14 capsule; Refill: 0    Dyspareunia in female  -     Ambulatory referral/consult to Urogynecology; Future; Expected date: 05/18/2022    Mixed stress and urge incontinence  -     Ambulatory referral/consult to Urogynecology; Future; Expected date: 05/18/2022        Current Outpatient Medications:     ascorbic acid, vitamin C, 1,000 mg TbSR, Take 1,000 mg by mouth 2 (two) times a day., Disp: 5 each, Rfl: 0    lisinopriL (PRINIVIL,ZESTRIL) 20 MG tablet, Take 20 mg by mouth 2 (two) times daily., Disp: , Rfl:     pantoprazole (PROTONIX) 40 MG tablet, Take 40 mg by mouth 2 (two) times daily., Disp: , Rfl:     nitrofurantoin, macrocrystal-monohydrate, (MACROBID) 100 MG capsule, Take 1 capsule (100 mg total) by mouth 2 (two) times daily. for 7 days, Disp: 14 capsule, Rfl: 0    Requested Prescriptions     Signed Prescriptions Disp Refills    nitrofurantoin, macrocrystal-monohydrate, (MACROBID) 100 MG capsule 14 capsule 0     Sig: Take 1 capsule (100 mg total) by mouth 2 (two) times daily. for 7 days       F/u as needed or if symptoms worsen or persist.  Future Appointments   Date Time Provider Department Center   6/14/2022  9:45 AM Bib Lechuga MD [facility] O[facility]       Signature: Michelle Yañez [facility]  Reviewed on 07/29/22 by Abdoul Martin MD.

## 2022-05-16 DIAGNOSIS — R10.31 RIGHT LOWER QUADRANT PAIN: ICD-10-CM

## 2022-07-17 ENCOUNTER — OFFICE VISIT (OUTPATIENT)
Dept: FAMILY MEDICINE | Facility: CLINIC | Age: 47
End: 2022-07-17
Payer: MEDICAID

## 2022-07-17 VITALS
DIASTOLIC BLOOD PRESSURE: 87 MMHG | SYSTOLIC BLOOD PRESSURE: 130 MMHG | HEIGHT: 66 IN | BODY MASS INDEX: 27.32 KG/M2 | WEIGHT: 170 LBS | OXYGEN SATURATION: 99 % | TEMPERATURE: 98 F | HEART RATE: 61 BPM

## 2022-07-17 DIAGNOSIS — U07.1 CLINICAL DIAGNOSIS OF COVID-19: Primary | ICD-10-CM

## 2022-07-17 DIAGNOSIS — Z11.52 ENCOUNTER FOR SCREENING LABORATORY TESTING FOR COVID-19 VIRUS: ICD-10-CM

## 2022-07-17 DIAGNOSIS — R05.9 COUGH: ICD-10-CM

## 2022-07-17 LAB
CTP QC/QA: YES
FLUAV AG NPH QL: NEGATIVE
FLUBV AG NPH QL: NEGATIVE
SARS-COV-2 AG RESP QL IA.RAPID: POSITIVE

## 2022-07-17 PROCEDURE — 4010F PR ACE/ARB THEARPY RXD/TAKEN: ICD-10-PCS | Mod: CPTII,,, | Performed by: NURSE PRACTITIONER

## 2022-07-17 PROCEDURE — 3008F PR BODY MASS INDEX (BMI) DOCUMENTED: ICD-10-PCS | Mod: CPTII,,, | Performed by: NURSE PRACTITIONER

## 2022-07-17 PROCEDURE — 99051 PR MEDICAL SERVICES, EVE/WKEND/HOLIDAY: ICD-10-PCS | Mod: ,,, | Performed by: NURSE PRACTITIONER

## 2022-07-17 PROCEDURE — 1159F PR MEDICATION LIST DOCUMENTED IN MEDICAL RECORD: ICD-10-PCS | Mod: CPTII,,, | Performed by: NURSE PRACTITIONER

## 2022-07-17 PROCEDURE — 1160F PR REVIEW ALL MEDS BY PRESCRIBER/CLIN PHARMACIST DOCUMENTED: ICD-10-PCS | Mod: CPTII,,, | Performed by: NURSE PRACTITIONER

## 2022-07-17 PROCEDURE — 1160F RVW MEDS BY RX/DR IN RCRD: CPT | Mod: CPTII,,, | Performed by: NURSE PRACTITIONER

## 2022-07-17 PROCEDURE — 99213 PR OFFICE/OUTPT VISIT, EST, LEVL III, 20-29 MIN: ICD-10-PCS | Mod: ,,, | Performed by: NURSE PRACTITIONER

## 2022-07-17 PROCEDURE — 99051 MED SERV EVE/WKEND/HOLIDAY: CPT | Mod: ,,, | Performed by: NURSE PRACTITIONER

## 2022-07-17 PROCEDURE — 4010F ACE/ARB THERAPY RXD/TAKEN: CPT | Mod: CPTII,,, | Performed by: NURSE PRACTITIONER

## 2022-07-17 PROCEDURE — 3075F SYST BP GE 130 - 139MM HG: CPT | Mod: CPTII,,, | Performed by: NURSE PRACTITIONER

## 2022-07-17 PROCEDURE — 3079F DIAST BP 80-89 MM HG: CPT | Mod: CPTII,,, | Performed by: NURSE PRACTITIONER

## 2022-07-17 PROCEDURE — 87428 SARSCOV & INF VIR A&B AG IA: CPT | Mod: RHCUB | Performed by: NURSE PRACTITIONER

## 2022-07-17 PROCEDURE — 3079F PR MOST RECENT DIASTOLIC BLOOD PRESSURE 80-89 MM HG: ICD-10-PCS | Mod: CPTII,,, | Performed by: NURSE PRACTITIONER

## 2022-07-17 PROCEDURE — 99213 OFFICE O/P EST LOW 20 MIN: CPT | Mod: ,,, | Performed by: NURSE PRACTITIONER

## 2022-07-17 PROCEDURE — 3075F PR MOST RECENT SYSTOLIC BLOOD PRESS GE 130-139MM HG: ICD-10-PCS | Mod: CPTII,,, | Performed by: NURSE PRACTITIONER

## 2022-07-17 PROCEDURE — 1159F MED LIST DOCD IN RCRD: CPT | Mod: CPTII,,, | Performed by: NURSE PRACTITIONER

## 2022-07-17 PROCEDURE — 3008F BODY MASS INDEX DOCD: CPT | Mod: CPTII,,, | Performed by: NURSE PRACTITIONER

## 2022-07-17 RX ORDER — PROMETHAZINE HYDROCHLORIDE 6.25 MG/5ML
6.25 SYRUP ORAL EVERY 6 HOURS PRN
Qty: 250 ML | Refills: 0 | Status: SHIPPED | OUTPATIENT
Start: 2022-07-17 | End: 2022-07-31

## 2022-07-17 RX ORDER — LORATADINE 10 MG/1
10 TABLET ORAL DAILY
Qty: 30 TABLET | Refills: 1 | Status: SHIPPED | OUTPATIENT
Start: 2022-07-17 | End: 2024-01-04

## 2022-07-17 NOTE — PROGRESS NOTES
NEREIDA Sparks   [place] SAVAGE DIALLO [address] [place] [address]  [phone]      PATIENT NAME: Josefina Izquierdo  : 1975  DATE: 22  MRN: [id number]      Billing Provider: NEREIDA Sparks  Level of Service: IA OFFICE/OUTPT VISIT, EST, LEVL III, 20-29 MIN  Patient PCP Information     Provider PCP Type    Mark Dean MD General          Reason for Visit / Chief Complaint: Otalgia, Cough, Headache, and Nasal Congestion (Patient has a cough, h/a, congestion and ears are stopped up X 2 days )       Update PCP  Update Chief Complaint         History of Present Illness / Problem Focused Workflow     Patient presents to clinic with the above listed complaints. Unsure of where she was exposed to COVID but states she does work with the public.     Review of Systems     Review of Systems   Constitutional: Negative for chills, diaphoresis, fatigue and fever.   HENT: Positive for congestion and ear pain. Negative for facial swelling and trouble swallowing.    Eyes: Negative for pain, discharge, redness, itching and visual disturbance.   Respiratory: Positive for cough. Negative for apnea, chest tightness, shortness of breath and wheezing.    Cardiovascular: Negative for chest pain, palpitations and leg swelling.   Gastrointestinal: Negative for abdominal pain, constipation, diarrhea, nausea and vomiting.   Genitourinary: Negative for dysuria.   Skin: Negative for rash.   Neurological: Positive for headaches. Negative for dizziness.       Medical / Social / Family History     Past Medical History:   Diagnosis Date    H/O atrioventricular jocy ablation     Hypertension     Small bowel obstruction 2022       Past Surgical History:   Procedure Laterality Date    AUGMENTATION OF BREAST      CARDIAC ELECTROPHYSIOLOGY MAPPING AND ABLATION  2012     SECTION      DIAGNOSTIC LAPAROSCOPY N/A 2022     Procedure: LAPAROSCOPY, DIAGNOSTIC;  Surgeon: Alexis Calabrese MD;  Location: [facility] OR;  Service: General;  Laterality: N/A;  LYSIS OF ADHESIONS    LEFT ARM SURGERY      LEFT LEG SURGERY      JOSE AND SCREWS    ROBOT-ASSISTED LAPAROSCOPIC HYSTERECTOMY N/A 12/08/2021    Procedure: ROBOTIC HYSTERECTOMY,POSS BSO,CYSTO ;  Surgeon: Young Jackson MD;  Location: [facility] OR;  Service: OB/GYN;  Laterality: N/A;    ROBOT-ASSISTED LAPAROSCOPIC SALPINGO-OOPHORECTOMY Left 12/08/2021    Procedure: ROBOTIC SALPINGO-OOPHORECTOMY;  Surgeon: Young Jackson MD;  Location: [facility] OR;  Service: OB/GYN;  Laterality: Left;    TUBAL LIGATION      VAGINAL DELIVERY      X 2       Social History  Ms.  reports that she has never smoked. She has never used smokeless tobacco. She reports previous alcohol use. She reports that she does not use drugs.    Family History  Ms.'s family history includes Colon cancer in her mother; Heart disease in her father and paternal grandmother; Hypertension in her father; Lung cancer in her mother.    Medications and Allergies     Medications  Outpatient Medications Marked as Taking for the 7/17/22 encounter (Office Visit) with NEREIDA Sparks   Medication Sig Dispense Refill    ascorbic acid, vitamin C, 1,000 mg TbSR Take 1,000 mg by mouth 2 (two) times a day. 5 each 0    lisinopriL (PRINIVIL,ZESTRIL) 20 MG tablet Take 20 mg by mouth 2 (two) times daily.      pantoprazole (PROTONIX) 40 MG tablet Take 40 mg by mouth 2 (two) times daily.         Allergies  Review of patient's allergies indicates:  No Known Allergies    Physical Examination     Vitals:    07/17/22 1048   BP: 130/87   Pulse: 61   Temp: 98 °F (36.7 °C)     Physical Exam  Constitutional:       General: She is not in acute distress.     Appearance: She is ill-appearing.   HENT:      Right Ear: External ear normal.      Left Ear: External ear normal.      Nose: Nose normal.   Eyes:      Extraocular Movements: Extraocular  movements intact.      Conjunctiva/sclera: Conjunctivae normal.      Pupils: Pupils are equal, round, and reactive to light.   Cardiovascular:      Rate and Rhythm: Normal rate and regular rhythm.      Pulses: Normal pulses.   Pulmonary:      Effort: Pulmonary effort is normal.   Neurological:      Mental Status: She is alert.         Assessment and Plan (including Health Maintenance)      Problem List  Smart Sets  Document Outside HM   :    Health Maintenance Due   Topic Date Due    Hepatitis C Screening  Never done    Lipid Panel  Never done    COVID-19 Vaccine (1) Never done    HIV Screening  Never done    TETANUS VACCINE  Never done    Mammogram  Never done    Colorectal Cancer Screening  Never done       Problem List Items Addressed This Visit    None     Visit Diagnoses     Encounter for screening laboratory testing for COVID-19 virus    -  Primary    Relevant Orders    POCT SARS-COV2 (COVID) with Flu Antigen (Completed)    Clinical diagnosis of COVID-19        Cough              Health Maintenance Topics with due status: Not Due       Topic Last Completion Date    Influenza Vaccine Not Due       No future appointments.       Signature:  NEREIDA Sparks [facility] CARE [facility] - [address] [address]  [phone]    Date of encounter: 7/17/22

## 2022-12-06 ENCOUNTER — PATIENT OUTREACH (OUTPATIENT)
Dept: ADMINISTRATIVE | Facility: HOSPITAL | Age: 47
End: 2022-12-06
Payer: MEDICAID

## 2022-12-14 ENCOUNTER — OFFICE VISIT (OUTPATIENT)
Dept: FAMILY MEDICINE | Facility: CLINIC | Age: 47
End: 2022-12-14
Payer: MEDICAID

## 2022-12-14 VITALS
DIASTOLIC BLOOD PRESSURE: 85 MMHG | TEMPERATURE: 98 F | HEART RATE: 65 BPM | WEIGHT: 172 LBS | SYSTOLIC BLOOD PRESSURE: 127 MMHG | BODY MASS INDEX: 27.64 KG/M2 | HEIGHT: 66 IN

## 2022-12-14 DIAGNOSIS — J32.9 SINUSITIS, UNSPECIFIED CHRONICITY, UNSPECIFIED LOCATION: Primary | ICD-10-CM

## 2022-12-14 PROBLEM — R10.2 PELVIC PAIN IN FEMALE: Status: ACTIVE | Noted: 2022-09-15

## 2022-12-14 PROBLEM — Z98.890 POST-OPERATIVE STATE: Status: ACTIVE | Noted: 2022-10-17

## 2022-12-14 PROBLEM — M62.89 PELVIC FLOOR DYSFUNCTION: Status: ACTIVE | Noted: 2022-09-15

## 2022-12-14 PROBLEM — N81.6 PROLAPSE OF POSTERIOR VAGINAL WALL: Status: ACTIVE | Noted: 2022-08-30

## 2022-12-14 PROCEDURE — 3079F DIAST BP 80-89 MM HG: CPT | Mod: CPTII,,, | Performed by: NURSE PRACTITIONER

## 2022-12-14 PROCEDURE — 3008F BODY MASS INDEX DOCD: CPT | Mod: CPTII,,, | Performed by: NURSE PRACTITIONER

## 2022-12-14 PROCEDURE — 96372 PR INJECTION,THERAP/PROPH/DIAG2ST, IM OR SUBCUT: ICD-10-PCS | Mod: ,,, | Performed by: NURSE PRACTITIONER

## 2022-12-14 PROCEDURE — 1159F MED LIST DOCD IN RCRD: CPT | Mod: CPTII,,, | Performed by: NURSE PRACTITIONER

## 2022-12-14 PROCEDURE — 3008F PR BODY MASS INDEX (BMI) DOCUMENTED: ICD-10-PCS | Mod: CPTII,,, | Performed by: NURSE PRACTITIONER

## 2022-12-14 PROCEDURE — 96372 THER/PROPH/DIAG INJ SC/IM: CPT | Mod: ,,, | Performed by: NURSE PRACTITIONER

## 2022-12-14 PROCEDURE — 1160F PR REVIEW ALL MEDS BY PRESCRIBER/CLIN PHARMACIST DOCUMENTED: ICD-10-PCS | Mod: CPTII,,, | Performed by: NURSE PRACTITIONER

## 2022-12-14 PROCEDURE — 1159F PR MEDICATION LIST DOCUMENTED IN MEDICAL RECORD: ICD-10-PCS | Mod: CPTII,,, | Performed by: NURSE PRACTITIONER

## 2022-12-14 PROCEDURE — 4010F PR ACE/ARB THEARPY RXD/TAKEN: ICD-10-PCS | Mod: CPTII,,, | Performed by: NURSE PRACTITIONER

## 2022-12-14 PROCEDURE — 99213 OFFICE O/P EST LOW 20 MIN: CPT | Mod: 25,,, | Performed by: NURSE PRACTITIONER

## 2022-12-14 PROCEDURE — 1160F RVW MEDS BY RX/DR IN RCRD: CPT | Mod: CPTII,,, | Performed by: NURSE PRACTITIONER

## 2022-12-14 PROCEDURE — 99213 PR OFFICE/OUTPT VISIT, EST, LEVL III, 20-29 MIN: ICD-10-PCS | Mod: 25,,, | Performed by: NURSE PRACTITIONER

## 2022-12-14 PROCEDURE — 4010F ACE/ARB THERAPY RXD/TAKEN: CPT | Mod: CPTII,,, | Performed by: NURSE PRACTITIONER

## 2022-12-14 PROCEDURE — 3074F PR MOST RECENT SYSTOLIC BLOOD PRESSURE < 130 MM HG: ICD-10-PCS | Mod: CPTII,,, | Performed by: NURSE PRACTITIONER

## 2022-12-14 PROCEDURE — 3079F PR MOST RECENT DIASTOLIC BLOOD PRESSURE 80-89 MM HG: ICD-10-PCS | Mod: CPTII,,, | Performed by: NURSE PRACTITIONER

## 2022-12-14 PROCEDURE — 3074F SYST BP LT 130 MM HG: CPT | Mod: CPTII,,, | Performed by: NURSE PRACTITIONER

## 2022-12-14 RX ORDER — DEXAMETHASONE SODIUM PHOSPHATE 4 MG/ML
6 INJECTION, SOLUTION INTRA-ARTICULAR; INTRALESIONAL; INTRAMUSCULAR; INTRAVENOUS; SOFT TISSUE
Status: COMPLETED | OUTPATIENT
Start: 2022-12-14 | End: 2022-12-14

## 2022-12-14 RX ORDER — OXYBUTYNIN CHLORIDE 10 MG/1
10 TABLET, EXTENDED RELEASE ORAL
COMMUNITY
Start: 2022-08-29 | End: 2023-07-24

## 2022-12-14 RX ORDER — PREDNISONE 20 MG/1
20 TABLET ORAL DAILY
Qty: 5 TABLET | Refills: 0 | Status: SHIPPED | OUTPATIENT
Start: 2022-12-14 | End: 2022-12-19

## 2022-12-14 RX ORDER — CEFDINIR 300 MG/1
300 CAPSULE ORAL 2 TIMES DAILY
Qty: 20 CAPSULE | Refills: 0 | Status: SHIPPED | OUTPATIENT
Start: 2022-12-14 | End: 2022-12-24

## 2022-12-14 RX ADMIN — DEXAMETHASONE SODIUM PHOSPHATE 6 MG: 4 INJECTION, SOLUTION INTRA-ARTICULAR; INTRALESIONAL; INTRAMUSCULAR; INTRAVENOUS; SOFT TISSUE at 12:12

## 2022-12-14 NOTE — PROGRESS NOTES
"Subjective:       Patient ID: Josefina Izquierdo is a 47 y.o. female.    Chief Complaint: Sinus Problem (C/o of sinus pain and pressure ) and Otalgia    Presents to clinic as above. Declines covid/flu swabs    Review of Systems   Constitutional: Negative.    HENT:  Positive for congestion, ear pain and sinus pain. Negative for ear discharge and sore throat.    Respiratory:  Positive for cough.    Cardiovascular: Negative.    Musculoskeletal: Negative.    Neurological:  Positive for headaches.        Reviewed family, medical, surgical, and social history.    Objective:      /85   Pulse 65   Temp 97.9 °F (36.6 °C)   Ht 5' 6" (1.676 m)   Wt 78 kg (172 lb)   LMP 11/22/2021   BMI 27.76 kg/m²   Physical Exam  Vitals and nursing note reviewed.   Constitutional:       General: She is not in acute distress.     Appearance: Normal appearance. She is normal weight. She is not ill-appearing, toxic-appearing or diaphoretic.   HENT:      Head: Normocephalic and atraumatic.      Right Ear: Hearing, tympanic membrane, ear canal and external ear normal.      Left Ear: Hearing, tympanic membrane, ear canal and external ear normal.      Nose: Nasal tenderness, mucosal edema, congestion and rhinorrhea present. Rhinorrhea is purulent.      Right Turbinates: Enlarged and swollen.      Left Turbinates: Enlarged and swollen.      Right Sinus: Maxillary sinus tenderness and frontal sinus tenderness present.      Left Sinus: Maxillary sinus tenderness and frontal sinus tenderness present.      Mouth/Throat:      Mouth: Mucous membranes are moist.      Pharynx: No oropharyngeal exudate or posterior oropharyngeal erythema.   Cardiovascular:      Rate and Rhythm: Normal rate and regular rhythm.      Heart sounds: Normal heart sounds.   Pulmonary:      Effort: Pulmonary effort is normal.      Breath sounds: Normal breath sounds.   Skin:     General: Skin is warm and dry.   Neurological:      Mental Status: She is alert. "   Psychiatric:         Mood and Affect: Mood normal.         Behavior: Behavior normal.         Thought Content: Thought content normal.         Judgment: Judgment normal.          No visits with results within 1 Day(s) from this visit.   Latest known visit with results is:   Patient Outreach on 12/06/2022   Component Date Value Ref Range Status    CRC Recommendation External 04/12/2022 No follow-up frequency specified   Final      Assessment:       1. Sinusitis, unspecified chronicity, unspecified location          Plan:       Sinusitis, unspecified chronicity, unspecified location  -     dexAMETHasone injection 6 mg  -     cefdinir (OMNICEF) 300 MG capsule; Take 1 capsule (300 mg total) by mouth 2 (two) times daily. for 10 days  Dispense: 20 capsule; Refill: 0  -     predniSONE (DELTASONE) 20 MG tablet; Take 1 tablet (20 mg total) by mouth once daily. Start tomorrow. for 5 days  Dispense: 5 tablet; Refill: 0    RTC PRN          Risks, benefits, and side effects were discussed with the patient. All questions were answered to the fullest satisfaction of the patient, and pt verbalized understanding and agreement to treatment plan. Pt was to call with any new or worsening symptoms, or present to the ER.

## 2023-03-21 ENCOUNTER — OFFICE VISIT (OUTPATIENT)
Dept: FAMILY MEDICINE | Facility: CLINIC | Age: 48
End: 2023-03-21
Payer: MEDICAID

## 2023-03-21 DIAGNOSIS — J02.9 PHARYNGITIS, UNSPECIFIED ETIOLOGY: ICD-10-CM

## 2023-03-21 DIAGNOSIS — J32.9 SINUSITIS, UNSPECIFIED CHRONICITY, UNSPECIFIED LOCATION: Primary | ICD-10-CM

## 2023-03-21 LAB
CTP QC/QA: YES
S PYO RRNA THROAT QL PROBE: NEGATIVE

## 2023-03-21 PROCEDURE — 96372 PR INJECTION,THERAP/PROPH/DIAG2ST, IM OR SUBCUT: ICD-10-PCS | Mod: ,,, | Performed by: NURSE PRACTITIONER

## 2023-03-21 PROCEDURE — 1159F PR MEDICATION LIST DOCUMENTED IN MEDICAL RECORD: ICD-10-PCS | Mod: CPTII,,, | Performed by: NURSE PRACTITIONER

## 2023-03-21 PROCEDURE — 1160F PR REVIEW ALL MEDS BY PRESCRIBER/CLIN PHARMACIST DOCUMENTED: ICD-10-PCS | Mod: CPTII,,, | Performed by: NURSE PRACTITIONER

## 2023-03-21 PROCEDURE — 87880 STREP A ASSAY W/OPTIC: CPT | Mod: RHCUB | Performed by: NURSE PRACTITIONER

## 2023-03-21 PROCEDURE — 96372 THER/PROPH/DIAG INJ SC/IM: CPT | Mod: ,,, | Performed by: NURSE PRACTITIONER

## 2023-03-21 PROCEDURE — 1159F MED LIST DOCD IN RCRD: CPT | Mod: CPTII,,, | Performed by: NURSE PRACTITIONER

## 2023-03-21 PROCEDURE — 1160F RVW MEDS BY RX/DR IN RCRD: CPT | Mod: CPTII,,, | Performed by: NURSE PRACTITIONER

## 2023-03-21 PROCEDURE — 99213 PR OFFICE/OUTPT VISIT, EST, LEVL III, 20-29 MIN: ICD-10-PCS | Mod: 25,,, | Performed by: NURSE PRACTITIONER

## 2023-03-21 PROCEDURE — 99213 OFFICE O/P EST LOW 20 MIN: CPT | Mod: 25,,, | Performed by: NURSE PRACTITIONER

## 2023-03-21 RX ORDER — CEFTRIAXONE 1 G/1
1 INJECTION, POWDER, FOR SOLUTION INTRAMUSCULAR; INTRAVENOUS
Status: COMPLETED | OUTPATIENT
Start: 2023-03-21 | End: 2023-03-21

## 2023-03-21 RX ORDER — CEFDINIR 300 MG/1
300 CAPSULE ORAL 2 TIMES DAILY
Qty: 20 CAPSULE | Refills: 0 | Status: SHIPPED | OUTPATIENT
Start: 2023-03-21 | End: 2023-03-21

## 2023-03-21 RX ORDER — AMOXICILLIN AND CLAVULANATE POTASSIUM 875; 125 MG/1; MG/1
1 TABLET, FILM COATED ORAL 2 TIMES DAILY
Qty: 20 TABLET | Refills: 0 | Status: SHIPPED | OUTPATIENT
Start: 2023-03-21 | End: 2023-03-31

## 2023-03-21 RX ORDER — AMITRIPTYLINE HYDROCHLORIDE 10 MG/1
20 TABLET, FILM COATED ORAL NIGHTLY
COMMUNITY
Start: 2023-02-06 | End: 2023-07-24

## 2023-03-21 RX ADMIN — CEFTRIAXONE 1 G: 1 INJECTION, POWDER, FOR SOLUTION INTRAMUSCULAR; INTRAVENOUS at 03:03

## 2023-03-21 NOTE — PROGRESS NOTES
Subjective:       Patient ID: Josefina Izquierdo is a 47 y.o. female.    Chief Complaint: Sore Throat (Patient went to a birthday party with her family. Almost all of the them tested positive for strep. Also has pressure behind her eyes, sore throat, and pain on the right side of her face. She also ran a fever of 100.0 last night.), Fever, Pressure Behind the Eyes, and Nasal Congestion    Presents to clinic as above. Declines covid/flu swabs.     Review of Systems   Constitutional:  Positive for fever and malaise/fatigue.   HENT:  Positive for congestion, sinus pain and sore throat. Negative for ear pain.    Respiratory: Negative.     Cardiovascular: Negative.    Gastrointestinal: Negative.    Musculoskeletal: Negative.    Neurological:  Positive for headaches.        Reviewed family, medical, surgical, and social history.    Objective:      LMP 11/22/2021   Physical Exam  Vitals and nursing note reviewed.   Constitutional:       General: She is not in acute distress.     Appearance: Normal appearance. She is not ill-appearing, toxic-appearing or diaphoretic.   HENT:      Head: Normocephalic and atraumatic.      Right Ear: Hearing, tympanic membrane, ear canal and external ear normal.      Left Ear: Hearing, tympanic membrane, ear canal and external ear normal.      Nose: Nasal tenderness, mucosal edema, congestion and rhinorrhea present. Rhinorrhea is purulent.      Right Turbinates: Enlarged and swollen.      Left Turbinates: Enlarged and swollen.      Right Sinus: Maxillary sinus tenderness and frontal sinus tenderness present.      Left Sinus: No maxillary sinus tenderness or frontal sinus tenderness.      Mouth/Throat:      Lips: Pink.      Mouth: Mucous membranes are moist.      Pharynx: Uvula midline. Posterior oropharyngeal erythema present. No pharyngeal swelling, oropharyngeal exudate or uvula swelling.      Tonsils: No tonsillar exudate or tonsillar abscesses.   Cardiovascular:      Rate and Rhythm:  Normal rate and regular rhythm.      Heart sounds: Normal heart sounds.   Pulmonary:      Effort: Pulmonary effort is normal.      Breath sounds: Normal breath sounds.   Skin:     General: Skin is warm and dry.   Neurological:      Mental Status: She is alert.   Psychiatric:         Mood and Affect: Mood normal.         Behavior: Behavior normal.         Thought Content: Thought content normal.         Judgment: Judgment normal.          Office Visit on 03/21/2023   Component Date Value Ref Range Status    Rapid Strep A Screen 03/21/2023 Negative  Negative Final     Acceptable 03/21/2023 Yes   Final      Assessment:       1. Sinusitis, unspecified chronicity, unspecified location    2. Pharyngitis, unspecified etiology          Plan:       Sinusitis, unspecified chronicity, unspecified location  -     Discontinue: cefdinir (OMNICEF) 300 MG capsule; Take 1 capsule (300 mg total) by mouth 2 (two) times daily. for 10 days  Dispense: 20 capsule; Refill: 0  -     cefTRIAXone injection 1 g  -     amoxicillin-clavulanate 875-125mg (AUGMENTIN) 875-125 mg per tablet; Take 1 tablet by mouth 2 (two) times a day. for 10 days  Dispense: 20 tablet; Refill: 0    Pharyngitis, unspecified etiology  -     POCT rapid strep A  -     Discontinue: cefdinir (OMNICEF) 300 MG capsule; Take 1 capsule (300 mg total) by mouth 2 (two) times daily. for 10 days  Dispense: 20 capsule; Refill: 0  -     cefTRIAXone injection 1 g  -     amoxicillin-clavulanate 875-125mg (AUGMENTIN) 875-125 mg per tablet; Take 1 tablet by mouth 2 (two) times a day. for 10 days  Dispense: 20 tablet; Refill: 0    RTC PRN          Risks, benefits, and side effects were discussed with the patient. All questions were answered to the fullest satisfaction of the patient, and pt verbalized understanding and agreement to treatment plan. Pt was to call with any new or worsening symptoms, or present to the ER.

## 2023-06-04 ENCOUNTER — HOSPITAL ENCOUNTER (EMERGENCY)
Facility: HOSPITAL | Age: 48
Discharge: HOME | End: 2023-06-04
Payer: MEDICAID

## 2023-06-04 VITALS
HEART RATE: 80 BPM | TEMPERATURE: 98 F | WEIGHT: 160 LBS | OXYGEN SATURATION: 99 % | HEIGHT: 66 IN | BODY MASS INDEX: 25.71 KG/M2 | SYSTOLIC BLOOD PRESSURE: 149 MMHG | DIASTOLIC BLOOD PRESSURE: 81 MMHG | RESPIRATION RATE: 18 BRPM

## 2023-06-04 DIAGNOSIS — R07.81 RIB PAIN ON LEFT SIDE: ICD-10-CM

## 2023-06-04 DIAGNOSIS — S20.212A CONTUSION OF RIB ON LEFT SIDE, INITIAL ENCOUNTER: Primary | ICD-10-CM

## 2023-06-04 DIAGNOSIS — S00.03XA CONTUSION OF SCALP, INITIAL ENCOUNTER: ICD-10-CM

## 2023-06-04 DIAGNOSIS — S06.0X0A CONCUSSION WITHOUT LOSS OF CONSCIOUSNESS, INITIAL ENCOUNTER: ICD-10-CM
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
## 2023-06-04 PROCEDURE — 96372 THER/PROPH/DIAG INJ SC/IM: CPT | Performed by: NURSE PRACTITIONER

## 2023-06-04 PROCEDURE — 96374 THER/PROPH/DIAG INJ IV PUSH: CPT

## 2023-06-04 PROCEDURE — 96375 TX/PRO/DX INJ NEW DRUG ADDON: CPT

## 2023-06-04 PROCEDURE — 99285 PR EMERGENCY DEPT VISIT,LEVEL V: ICD-10-PCS | Mod: ,,, | Performed by: NURSE PRACTITIONER

## 2023-06-04 PROCEDURE — 63600175 PHARM REV CODE 636 W HCPCS: Performed by: NURSE PRACTITIONER

## 2023-06-04 PROCEDURE — 99285 EMERGENCY DEPT VISIT HI MDM: CPT | Mod: ,,, | Performed by: NURSE PRACTITIONER

## 2023-06-04 PROCEDURE — 99285 EMERGENCY DEPT VISIT HI MDM: CPT | Mod: 25

## 2023-06-04 RX ORDER — MORPHINE SULFATE 4 MG/ML
4 INJECTION, SOLUTION INTRAMUSCULAR; INTRAVENOUS
Status: COMPLETED | OUTPATIENT
Start: 2023-06-04 | End: 2023-06-04

## 2023-06-04 RX ORDER — TRAMADOL HYDROCHLORIDE 50 MG/1
50 TABLET ORAL EVERY 6 HOURS PRN
Qty: 12 TABLET | Refills: 0 | Status: SHIPPED | OUTPATIENT
Start: 2023-06-04 | End: 2023-07-24

## 2023-06-04 RX ORDER — ONDANSETRON 4 MG/1
8 TABLET, ORALLY DISINTEGRATING ORAL EVERY 8 HOURS PRN
Qty: 12 TABLET | Refills: 0 | Status: SHIPPED | OUTPATIENT
Start: 2023-06-04 | End: 2024-01-04

## 2023-06-04 RX ORDER — ONDANSETRON 2 MG/ML
4 INJECTION INTRAMUSCULAR; INTRAVENOUS
Status: COMPLETED | OUTPATIENT
Start: 2023-06-04 | End: 2023-06-04

## 2023-06-04 RX ORDER — KETOROLAC TROMETHAMINE 30 MG/ML
30 INJECTION, SOLUTION INTRAMUSCULAR; INTRAVENOUS
Status: COMPLETED | OUTPATIENT
Start: 2023-06-04 | End: 2023-06-04

## 2023-06-04 RX ADMIN — ONDANSETRON 4 MG: 2 INJECTION INTRAMUSCULAR; INTRAVENOUS at 06:06

## 2023-06-04 RX ADMIN — MORPHINE SULFATE 4 MG: 4 INJECTION, SOLUTION INTRAMUSCULAR; INTRAVENOUS at 06:06

## 2023-06-04 RX ADMIN — KETOROLAC TROMETHAMINE 30 MG: 30 INJECTION, SOLUTION INTRAMUSCULAR; INTRAVENOUS at 07:06

## 2023-06-04 NOTE — ED TRIAGE NOTES
Pt presents to ED with c/o falling down approx 5 stairs and landing directly on back.  Pt reports having left sided rib pain, back pain, and left arm pain. unknown LOC. pt reports hitting head and having multiple knots on head. Pt reports nausea and vomiting.

## 2023-06-04 NOTE — DISCHARGE INSTRUCTIONS
Take prescriptions as directed. Follow up with primary care provider in 1 week for recheck and continued care and management. Return to the ED for worsening signs and symptoms or otherwise as needed.

## 2023-06-04 NOTE — ED PROVIDER NOTES
Encounter Date: 2023       History     Chief Complaint   Patient presents with    Fall     46 y/o WF with PMH of HTN presents with c/o falling down some steps at home. States she slipped on the top step and fell down 5 steps. Reports she hit her head but denies loss of consciousness. Reports she is having a headache and nausea. Also reports she landed on her back and c/o left side rib and back pain that is worse with certain movements and with taking deep breaths. She also reports she has had some dizziness since her fall. She has had nothing for her symptoms. She denies nicotine, alcohol or illicit drug use.     The history is provided by the patient.   Review of patient's allergies indicates:  No Known Allergies  Past Medical History:   Diagnosis Date    H/O atrioventricular jocy ablation     Hypertension     Small bowel obstruction 2022     Past Surgical History:   Procedure Laterality Date    AUGMENTATION OF BREAST      CARDIAC ELECTROPHYSIOLOGY MAPPING AND ABLATION  2012     SECTION      DIAGNOSTIC LAPAROSCOPY N/A 2022    Procedure: LAPAROSCOPY, DIAGNOSTIC;  Surgeon: Alexis Calabrese MD;  Location: [facility], [place];  Service: General;  Laterality: N/A;  LYSIS OF ADHESIONS    LEFT ARM SURGERY      LEFT LEG SURGERY      JOSE AND SCREWS    ROBOT-ASSISTED LAPAROSCOPIC HYSTERECTOMY N/A 2021    Procedure: ROBOTIC HYSTERECTOMY,POSS BSO,CYSTO ;  Surgeon: Young Jackson MD;  Location: [facility] OR;  Service: OB/GYN;  Laterality: N/A;    ROBOT-ASSISTED LAPAROSCOPIC SALPINGO-OOPHORECTOMY Left 2021    Procedure: ROBOTIC SALPINGO-OOPHORECTOMY;  Surgeon: Young Jackson MD;  Location: [facility], [place];  Service: OB/GYN;  Laterality: Left;    TUBAL LIGATION      VAGINAL DELIVERY      X 2     Family History   Problem Relation Age of Onset    Colon cancer Mother     Lung cancer Mother     Heart disease Paternal Grandmother     Hypertension Father     Heart disease Father      Social History      Tobacco Use    Smoking status: Never    Smokeless tobacco: Never   Substance Use Topics    Alcohol use: Not Currently    Drug use: Never     Review of Systems   Constitutional:         See HPI   All other systems reviewed and are negative.    Physical Exam     Initial Vitals [06/04/23 1731]   BP Pulse Resp Temp SpO2   (!) 148/103 99 18 98.2 °F (36.8 °C) 98 %      MAP       --         Physical Exam    Constitutional: She appears well-developed and well-nourished. She is cooperative.   Eyes: Pupils are equal, round, and reactive to light.   Cardiovascular:  Normal rate, regular rhythm, normal heart sounds and normal pulses.           Pulmonary/Chest: Effort normal and breath sounds normal.   Abdominal: Abdomen is soft. Bowel sounds are normal. There is no abdominal tenderness.     Neurological: She is alert and oriented to person, place, and time.   Skin: Skin is warm, dry and intact. Capillary refill takes less than 2 seconds.        Psychiatric: She has a normal mood and affect. Her speech is normal and behavior is normal. Judgment and thought content normal. Cognition and memory are normal.       Medical Screening Exam   See Full Note    ED Course   Procedures  Labs Reviewed - No data to display       Imaging Results              CT Head Without Contrast (Final result)  Result time 06/04/23 18:35:12      Final result by Jasmeet Whittington MD (06/04/23 18:35:12)                   Impression:      No acute intracranial process    Right parietal soft tissue swelling and contusion      Electronically signed by: Jasmeet Whittington  Date:    06/04/2023  Time:    18:35               Narrative:    EXAMINATION:  CT head without contrast    CLINICAL HISTORY:  fall, headache, nausea, vomiting;    TECHNIQUE:  Transaxial CT sections were obtained through the brain without contrast.    The CT examination was performed using one or more of the following dose reduction techniques: Automated exposure control, adjustment of the  mA and kV according to patient's size, use of acute or iterative reconstruction techniques.    COMPARISON:  CT head Cindy 15, 2020    FINDINGS:  The ventricles are midline in position without evidence of hydrocephalus. There is no mass or area of parenchymal hemorrhage. There is no gross CT evidence of acute cortical stroke. There is no extra-axial hematoma. The partially visualized sinuses are generally clear. There is no obvious skull fracture.    There is some right parietal scalp soft tissue swelling/contusion                                       CT Cervical Spine Without Contrast (Final result)  Result time 06/04/23 18:40:40      Final result by Jasmeet Whittington MD (06/04/23 18:40:40)                   Impression:      No acute fracture    Degenerative disc disease      Electronically signed by: Jasmeet Whittington  Date:    06/04/2023  Time:    18:40               Narrative:    EXAMINATION:  CT CERVICAL SPINE WITHOUT CONTRAST    CLINICAL HISTORY:  Neck trauma, impaired ROM (Age 16-64y);    TECHNIQUE:  Thin spiral CT sections were obtained through the cervical spine without contrast. Multiplanar reconstruction images are also evaluated.    The CT examination was performed using one or more of the following dose reduction techniques: Automated exposure control, adjustment of the mA and kV according to patient's size, use of acute or iterative reconstruction techniques.    COMPARISON:  Cindy 15, 2020    FINDINGS:  There is no acute fracture or prevertebral soft tissue swelling.  There is mild to moderate degenerative disc narrowing at C5-C6.  There is mild to moderate anterior spondylosis at C4-C5 through C6-C7.    There is no gross disc extrusion or obvious high-grade spinal stenosis seen on this noncontrast study.                                       X-Ray Ribs 3 Views Bilateral (Final result)  Result time 06/04/23 18:37:56      Final result by Jasmeet Whittington MD (06/04/23 18:37:56)                    Impression:      No acute rib fracture seen    Old fractures of the right 5th and 6th ribs      Electronically signed by: Jasmeet Nelsy  Date:    06/04/2023  Time:    18:37               Narrative:    EXAMINATION:  XR RIBS 3 VIEWS BILATERAL    CLINICAL HISTORY:  .  Unspecified fall, initial encounter    COMPARISON:  Chest x-ray December 6, 2021    TECHNIQUE:  AP and oblique views of the ribs bilaterally.  7 total views    FINDINGS:  There is old fracture deformity of the posterolateral aspects of the right 5th and 6th ribs.  No acute rib fracture seen with certainty.  There is no focal lytic or blastic lesion.  There is no obvious pneumothorax or pleural effusion                                       Medications   morphine injection 4 mg (4 mg Intravenous Given 6/4/23 1838)   ondansetron injection 4 mg (4 mg Intravenous Given 6/4/23 1838)   ketorolac injection 30 mg (30 mg Intramuscular Given 6/4/23 1900)     Medical Decision Making:   Initial Assessment:   48 y/o WF with PMH of HTN presents with c/o falling down some steps at home. States she slipped on the top step and fell down 5 steps. Reports she hit her head but denies loss of consciousness. Reports she is having a headache and nausea. Also reports she landed on her back and c/o left side rib and back pain that is worse with certain movements and with taking deep breaths. She also reports she has had some dizziness since her fall. She has had nothing for her symptoms. She denies nicotine, alcohol or illicit drug use.   Differential Diagnosis:   [facility]I  Concussion  Rib fracture  Contusion  Vs other  Clinical Tests:   Radiological Study: Ordered and Reviewed  ED Management:  Morphine and toradol and zofran given for pain/nausea. CT head, c spine and rib xray with no acute abnormalities. Rx for zofran/ultram. Counseled on use. Counseled on supportive measures. Strict f/u instructions given. Warning s/s discussed and return precautions given; the patient has  v/u.                       Clinical Impression:   Final diagnoses:  [W19.XXXA] Fall  [R07.81] Rib pain on left side  [S20.212A] Contusion of rib on left side, initial encounter (Primary)  [S00.03XA] Contusion of scalp, initial encounter  [S06.0X0A] Concussion without loss of consciousness, initial encounter        ED Disposition Condition    Discharge Stable          ED Prescriptions       Medication Sig Dispense Start Date End Date Auth. Provider    ondansetron (ZOFRAN-ODT) 4 MG TbDL Take 2 tablets (8 mg total) by mouth every 8 (eight) hours as needed (nausea, vomiting). 12 tablet 6/4/2023 -- NEREIDA Partida    traMADoL (ULTRAM) 50 mg tablet Take 1 tablet (50 mg total) by mouth every 6 (six) hours as needed for Pain. 12 tablet 6/4/2023 -- NEREIDA Partida          Follow-up Information       Follow up With Specialties Details Why Contact Info    Primary Care Provider  In 1 week               NEREIDA Partida  06/04/23 2020

## 2023-06-05 RX ORDER — DULOXETIN HYDROCHLORIDE 60 MG/1
60 CAPSULE, DELAYED RELEASE ORAL
COMMUNITY
Start: 2023-02-06 | End: 2023-07-24

## 2023-06-05 RX ORDER — LUBIPROSTONE 24 UG/1
24 CAPSULE ORAL
COMMUNITY
Start: 2023-05-16 | End: 2023-06-06

## 2023-06-05 NOTE — ED NOTES
Pt told nurse that she was having severe rectal bleeding for the past 3 days. Nurse went to clarify recommendation to take Ibuprofen on Discharge paperwork. NP told pt and nurse not to take ibuprofen and that she would call in Zofran and another medicine to take the place of Ibuprofen to her pharmacy. Pt confirmed understanding.

## 2023-06-06 ENCOUNTER — OFFICE VISIT (OUTPATIENT)
Dept: GASTROENTEROLOGY | Facility: CLINIC | Age: 48
End: 2023-06-06
Payer: MEDICAID

## 2023-06-06 ENCOUNTER — HOSPITAL ENCOUNTER (OUTPATIENT)
Dept: RADIOLOGY | Facility: HOSPITAL | Age: 48
Discharge: HOME | End: 2023-06-06
Attending: NURSE PRACTITIONER
Payer: MEDICAID

## 2023-06-06 VITALS
DIASTOLIC BLOOD PRESSURE: 84 MMHG | WEIGHT: 163.38 LBS | BODY MASS INDEX: 26.26 KG/M2 | HEIGHT: 66 IN | SYSTOLIC BLOOD PRESSURE: 133 MMHG

## 2023-06-06 DIAGNOSIS — R10.9 ABDOMINAL PAIN, UNSPECIFIED ABDOMINAL LOCATION: Primary | ICD-10-CM

## 2023-06-06 DIAGNOSIS — K62.5 RECTAL BLEEDING: ICD-10-CM

## 2023-06-06 DIAGNOSIS — R10.9 ABDOMINAL PAIN, UNSPECIFIED ABDOMINAL LOCATION: ICD-10-CM

## 2023-06-06 DIAGNOSIS — K59.00 CONSTIPATION, UNSPECIFIED CONSTIPATION TYPE: ICD-10-CM

## 2023-06-06 PROCEDURE — 99214 OFFICE O/P EST MOD 30 MIN: CPT | Mod: PBBFAC | Performed by: NURSE PRACTITIONER

## 2023-06-06 PROCEDURE — 1160F RVW MEDS BY RX/DR IN RCRD: CPT | Mod: CPTII,,, | Performed by: NURSE PRACTITIONER

## 2023-06-06 PROCEDURE — 3079F PR MOST RECENT DIASTOLIC BLOOD PRESSURE 80-89 MM HG: ICD-10-PCS | Mod: CPTII,,, | Performed by: NURSE PRACTITIONER

## 2023-06-06 PROCEDURE — 3008F PR BODY MASS INDEX (BMI) DOCUMENTED: ICD-10-PCS | Mod: CPTII,,, | Performed by: NURSE PRACTITIONER

## 2023-06-06 PROCEDURE — 3075F PR MOST RECENT SYSTOLIC BLOOD PRESS GE 130-139MM HG: ICD-10-PCS | Mod: CPTII,,, | Performed by: NURSE PRACTITIONER

## 2023-06-06 PROCEDURE — 1160F PR REVIEW ALL MEDS BY PRESCRIBER/CLIN PHARMACIST DOCUMENTED: ICD-10-PCS | Mod: CPTII,,, | Performed by: NURSE PRACTITIONER

## 2023-06-06 PROCEDURE — 74018 RADEX ABDOMEN 1 VIEW: CPT | Mod: 26,,, | Performed by: RADIOLOGY

## 2023-06-06 PROCEDURE — 1159F PR MEDICATION LIST DOCUMENTED IN MEDICAL RECORD: ICD-10-PCS | Mod: CPTII,,, | Performed by: NURSE PRACTITIONER

## 2023-06-06 PROCEDURE — 3008F BODY MASS INDEX DOCD: CPT | Mod: CPTII,,, | Performed by: NURSE PRACTITIONER

## 2023-06-06 PROCEDURE — 3079F DIAST BP 80-89 MM HG: CPT | Mod: CPTII,,, | Performed by: NURSE PRACTITIONER

## 2023-06-06 PROCEDURE — 99204 PR OFFICE/OUTPT VISIT, NEW, LEVL IV, 45-59 MIN: ICD-10-PCS | Mod: S$PBB,,, | Performed by: NURSE PRACTITIONER

## 2023-06-06 PROCEDURE — 74018 RADEX ABDOMEN 1 VIEW: CPT | Mod: TC

## 2023-06-06 PROCEDURE — 1159F MED LIST DOCD IN RCRD: CPT | Mod: CPTII,,, | Performed by: NURSE PRACTITIONER

## 2023-06-06 PROCEDURE — 99204 OFFICE O/P NEW MOD 45 MIN: CPT | Mod: S$PBB,,, | Performed by: NURSE PRACTITIONER

## 2023-06-06 PROCEDURE — 3075F SYST BP GE 130 - 139MM HG: CPT | Mod: CPTII,,, | Performed by: NURSE PRACTITIONER

## 2023-06-06 PROCEDURE — 74018 XR KUB: ICD-10-PCS | Mod: 26,,, | Performed by: RADIOLOGY

## 2023-06-06 NOTE — PROGRESS NOTES
Josefina Izquierdo is a 47 y.o. female here for No chief complaint on file.        PCP: Primary Doctor No  Referring Provider: No referring provider defined for this encounter.     HPI:  Presents with report of abdominal pain.  Patient reports abdominal pain associated with constipation.  Reports constant abdominal pain with abdominal bloating.  States that she is not had a normal bowel movement in approximately 2 weeks.  Reports a complicated gyn history with rectal prolapse as well as pelvic floor dysfunction.  States that she has had surgery in [place], and has been following with their office every couple of weeks.  She reports onset of rectal bleeding that has been present for at least one week. Blood is noted in the toilet. No fever. Endorses nausea and vomiting due to abdominal pain and discomfort.  States that she has not eaten any solid food in the last 3 days.  We did discuss a for acute abdominal pain, nausea, vomiting and inability to hold in liquids that she would have to be evaluated at the ER.  Patient verbalized understanding.        ROS:  Review of Systems   Constitutional:  Negative for appetite change, fatigue, fever and unexpected weight change.   HENT:  Negative for trouble swallowing.    Respiratory:  Negative for shortness of breath.    Cardiovascular:  Negative for chest pain and palpitations.   Gastrointestinal:  Positive for abdominal distention, abdominal pain, blood in stool, constipation, nausea and vomiting. Negative for change in bowel habit, diarrhea, rectal pain, reflux and change in bowel habit.   Musculoskeletal:  Negative for gait problem.   Integumentary:  Negative for pallor.   Neurological:  Positive for weakness. Negative for light-headedness.   Psychiatric/Behavioral:  The patient is not nervous/anxious.         PMHX:  has a past medical history of H/O atrioventricular jocy ablation, Hypertension, and Small bowel obstruction (1/4/2022).    PSHX:  has a past surgical  "history that includes Tubal ligation; Augmentation of breast; LEFT ARM SURGERY; LEFT LEG SURGERY;  section; Vaginal delivery; Robot-assisted laparoscopic hysterectomy (N/A, 2021); Robot-assisted laparoscopic salpingo-oophorectomy (Left, 2021); Diagnostic laparoscopy (N/A, 2022); and Cardiac electrophysiology mapping and ablation ().    PFHX: family history includes Colon cancer in her mother; Heart disease in her father and paternal grandmother; Hypertension in her father; Lung cancer in her mother.    PSlHX:  reports that she has never smoked. She has never used smokeless tobacco. She reports that she does not currently use alcohol. She reports that she does not use drugs.        Review of patient's allergies indicates:  No Known Allergies    Medication List with Changes/Refills   Current Medications    AMITRIPTYLINE (ELAVIL) 10 MG TABLET    Take 20 mg by mouth every evening.    DULOXETINE (CYMBALTA) 60 MG CAPSULE    Take 60 mg by mouth.    LIDOCAINE HCL 2 % CREA    1 application daily as needed.    LISINOPRIL (PRINIVIL,ZESTRIL) 20 MG TABLET    Take 20 mg by mouth 2 (two) times daily.    LORATADINE (CLARITIN) 10 MG TABLET    Take 1 tablet (10 mg total) by mouth once daily.    ONDANSETRON (ZOFRAN-ODT) 4 MG TBDL    Take 2 tablets (8 mg total) by mouth every 8 (eight) hours as needed (nausea, vomiting).    OXYBUTYNIN (DITROPAN-XL) 10 MG 24 HR TABLET    Take 10 mg by mouth.    TRAMADOL (ULTRAM) 50 MG TABLET    Take 1 tablet (50 mg total) by mouth every 6 (six) hours as needed for Pain.   Discontinued Medications    LUBIPROSTONE (AMITIZA) 24 MCG CAP    Take 24 mcg by mouth.        Objective Findings:  Vital Signs:  /84   Ht 5' 6" (1.676 m)   Wt 74.1 kg (163 lb 6.4 oz)   LMP 2021   BMI 26.37 kg/m²  Body mass index is 26.37 kg/m².    Physical Exam:  Physical Exam  Vitals and nursing note reviewed.   Constitutional:       General: She is not in acute distress.     Appearance: " Normal appearance.   HENT:      Mouth/Throat:      Mouth: Mucous membranes are moist.   Cardiovascular:      Rate and Rhythm: Normal rate.   Pulmonary:      Effort: Pulmonary effort is normal.      Breath sounds: No wheezing, rhonchi or rales.   Abdominal:      General: Bowel sounds are normal. There is no distension.      Palpations: Abdomen is soft. There is no mass.      Tenderness: There is generalized abdominal tenderness. There is no guarding.   Skin:     General: Skin is warm and dry.      Coloration: Skin is not jaundiced or pale.   Neurological:      Mental Status: She is alert and oriented to person, place, and time.   Psychiatric:         Mood and Affect: Mood normal.        Labs:  Lab Results   Component Value Date    WBC 5.79 06/06/2023    HGB 13.2 06/06/2023    HCT 39.9 06/06/2023    MCV 90.3 06/06/2023    RDW 13.3 06/06/2023     06/06/2023    LYMPH 26.8 (L) 06/06/2023    LYMPH 1.55 06/06/2023    MONO 7.3 (H) 06/06/2023    EOS 0.10 06/06/2023    BASO 0.01 06/06/2023     Lab Results   Component Value Date     01/12/2022    K 3.8 01/12/2022     01/12/2022    CO2 22 01/12/2022    GLU 84 01/12/2022    BUN 4 (L) 01/12/2022    CREATININE 0.63 01/12/2022    CALCIUM 8.0 (L) 01/12/2022    PROT 5.6 (L) 01/11/2022    ALBUMIN 2.5 (L) 01/11/2022    BILITOT 0.3 01/11/2022    ALKPHOS 79 01/11/2022    AST 24 01/11/2022    ALT 37 01/11/2022         Imaging: X-Ray Ribs 3 Views Bilateral    Result Date: 6/4/2023  EXAMINATION: XR RIBS 3 VIEWS BILATERAL CLINICAL HISTORY: .  Unspecified fall, initial encounter COMPARISON: Chest x-ray December 6, 2021 TECHNIQUE: AP and oblique views of the ribs bilaterally.  7 total views FINDINGS: There is old fracture deformity of the posterolateral aspects of the right 5th and 6th ribs.  No acute rib fracture seen with certainty.  There is no focal lytic or blastic lesion.  There is no obvious pneumothorax or pleural effusion     No acute rib fracture seen Old fractures  of the right 5th and 6th ribs Electronically signed by: Jasmeet Whittington Date:    06/04/2023 Time:    18:37    CT Head Without Contrast    Result Date: 6/4/2023  EXAMINATION: CT head without contrast CLINICAL HISTORY: fall, headache, nausea, vomiting; TECHNIQUE: Transaxial CT sections were obtained through the brain without contrast. The CT examination was performed using one or more of the following dose reduction techniques: Automated exposure control, adjustment of the mA and kV according to patient's size, use of acute or iterative reconstruction techniques. COMPARISON: CT head Cindy 15, 2020 FINDINGS: The ventricles are midline in position without evidence of hydrocephalus. There is no mass or area of parenchymal hemorrhage. There is no gross CT evidence of acute cortical stroke. There is no extra-axial hematoma. The partially visualized sinuses are generally clear. There is no obvious skull fracture. There is some right parietal scalp soft tissue swelling/contusion     No acute intracranial process Right parietal soft tissue swelling and contusion Electronically signed by: Jasmeet Whittington Date:    06/04/2023 Time:    18:35    CT Cervical Spine Without Contrast    Result Date: 6/4/2023  EXAMINATION: CT CERVICAL SPINE WITHOUT CONTRAST CLINICAL HISTORY: Neck trauma, impaired ROM (Age 16-64y); TECHNIQUE: Thin spiral CT sections were obtained through the cervical spine without contrast. Multiplanar reconstruction images are also evaluated. The CT examination was performed using one or more of the following dose reduction techniques: Automated exposure control, adjustment of the mA and kV according to patient's size, use of acute or iterative reconstruction techniques. COMPARISON: Cindy 15, 2020 FINDINGS: There is no acute fracture or prevertebral soft tissue swelling.  There is mild to moderate degenerative disc narrowing at C5-C6.  There is mild to moderate anterior spondylosis at C4-C5 through C6-C7. There is no  gross disc extrusion or obvious high-grade spinal stenosis seen on this noncontrast study.     No acute fracture Degenerative disc disease Electronically signed by: Jasmeet Whittington Date:    06/04/2023 Time:    18:40        Assessment:  Josfeina Izquierdo is a 47 y.o. female here with:  1. Abdominal pain, unspecified abdominal location    2. Constipation, unspecified constipation type    3. Rectal bleeding          Recommendations:  1. KUB today  2. Schedule CT abdomen and pelvis  3. CBC, CMP  4. Schedule colonoscopy  5. Go to the ER for acute abdominal pain, nausea and vomiting, fever or increased rectal bleeding    Follow up in about 6 weeks (around 7/18/2023).      Order summary:  Orders Placed This Encounter    X-Ray KUB    CT Abdomen Pelvis With Contrast    CBC Auto Differential    Comprehensive Metabolic Panel    Colonoscopy       Thank you for allowing me to participate in the care of Josefina Izquierdo.      CAT KiddC

## 2023-06-19 LAB — CRC RECOMMENDATION EXT: NORMAL

## 2023-07-24 ENCOUNTER — OFFICE VISIT (OUTPATIENT)
Dept: FAMILY MEDICINE | Facility: CLINIC | Age: 48
End: 2023-07-24
Payer: MEDICAID

## 2023-07-24 VITALS
DIASTOLIC BLOOD PRESSURE: 89 MMHG | SYSTOLIC BLOOD PRESSURE: 135 MMHG | WEIGHT: 161 LBS | TEMPERATURE: 98 F | HEART RATE: 65 BPM | RESPIRATION RATE: 20 BRPM | OXYGEN SATURATION: 96 % | BODY MASS INDEX: 25.88 KG/M2 | HEIGHT: 66 IN

## 2023-07-24 DIAGNOSIS — K59.09 OTHER CONSTIPATION: Primary | Chronic | ICD-10-CM

## 2023-07-24 DIAGNOSIS — R10.9 ABDOMINAL PAIN, UNSPECIFIED ABDOMINAL LOCATION: ICD-10-CM

## 2023-07-24 PROCEDURE — 3008F PR BODY MASS INDEX (BMI) DOCUMENTED: ICD-10-PCS | Mod: CPTII,,, | Performed by: NURSE PRACTITIONER

## 2023-07-24 PROCEDURE — 1160F PR REVIEW ALL MEDS BY PRESCRIBER/CLIN PHARMACIST DOCUMENTED: ICD-10-PCS | Mod: CPTII,,, | Performed by: NURSE PRACTITIONER

## 2023-07-24 PROCEDURE — 99213 OFFICE O/P EST LOW 20 MIN: CPT | Mod: ,,, | Performed by: NURSE PRACTITIONER

## 2023-07-24 PROCEDURE — 3075F SYST BP GE 130 - 139MM HG: CPT | Mod: CPTII,,, | Performed by: NURSE PRACTITIONER

## 2023-07-24 PROCEDURE — 3079F DIAST BP 80-89 MM HG: CPT | Mod: CPTII,,, | Performed by: NURSE PRACTITIONER

## 2023-07-24 PROCEDURE — 3075F PR MOST RECENT SYSTOLIC BLOOD PRESS GE 130-139MM HG: ICD-10-PCS | Mod: CPTII,,, | Performed by: NURSE PRACTITIONER

## 2023-07-24 PROCEDURE — 1159F PR MEDICATION LIST DOCUMENTED IN MEDICAL RECORD: ICD-10-PCS | Mod: CPTII,,, | Performed by: NURSE PRACTITIONER

## 2023-07-24 PROCEDURE — 3008F BODY MASS INDEX DOCD: CPT | Mod: CPTII,,, | Performed by: NURSE PRACTITIONER

## 2023-07-24 PROCEDURE — 1159F MED LIST DOCD IN RCRD: CPT | Mod: CPTII,,, | Performed by: NURSE PRACTITIONER

## 2023-07-24 PROCEDURE — 1160F RVW MEDS BY RX/DR IN RCRD: CPT | Mod: CPTII,,, | Performed by: NURSE PRACTITIONER

## 2023-07-24 PROCEDURE — 3079F PR MOST RECENT DIASTOLIC BLOOD PRESSURE 80-89 MM HG: ICD-10-PCS | Mod: CPTII,,, | Performed by: NURSE PRACTITIONER

## 2023-07-24 PROCEDURE — 99213 PR OFFICE/OUTPT VISIT, EST, LEVL III, 20-29 MIN: ICD-10-PCS | Mod: ,,, | Performed by: NURSE PRACTITIONER

## 2023-07-24 RX ORDER — NITROFURANTOIN 25; 75 MG/1; MG/1
100 CAPSULE ORAL 2 TIMES DAILY PRN
COMMUNITY
Start: 2023-07-14 | End: 2024-01-04

## 2023-07-24 RX ORDER — POLYETHYLENE GLYCOL 3350, SODIUM CHLORIDE, SODIUM BICARBONATE, POTASSIUM CHLORIDE 420; 11.2; 5.72; 1.48 G/4L; G/4L; G/4L; G/4L
1 POWDER, FOR SOLUTION ORAL DAILY PRN
COMMUNITY
Start: 2023-06-07 | End: 2023-07-24

## 2023-07-24 RX ORDER — LUBIPROSTONE 24 UG/1
24 CAPSULE ORAL 2 TIMES DAILY
COMMUNITY
End: 2024-04-01

## 2023-07-24 NOTE — PROGRESS NOTES
[facility]       PATIENT NAME: Josefina Izquierdo   : 1975    AGE: 47 y.o. DATE OF ENCOUNTER: 23   MRN: [id number]      Visit type: WALK-IN  PCP:  Primary Doctor No    Reason for Visit / Chief Complaint: Constipation (Patient presents to clinic for constipation x 1 week./Tried amitraza, taking 5 or 6 different laxitives and still not working.)     Subjective:     Presents c/o ongoing constipation and vomiting when she eats.    Last BM > 1 wk ago. Stays bloated w/ abd discomfort.  In the past week has taken, Amitiza 24 mcg twice daily, 4 stimulant laxatives, vegetable laxatives, 4 cap fulls of MiraLax without relief.    Went for colonoscopy per Dr. Mclean 23 and after drinking gallon of Nulytely hadn't gone enough to have successful colonoscopy, and he recommended she see GI in [place].    Saw IMANI Dutton FNP 23 and reports colonoscopy & CT was scheduled here, but   Dr. Dwayne Dutton recommended she see GI in [facility] because they would be more familiar with her case since she already had surgery in [facility].  Then she saw Dr. Mclean for colonoscopy and he recommended she return to [place] to see GI.  Requests referral back to local GI.    [facility] probs w/ bladder & constipation since hysterectomy.  Surgery 2022 per Dr. Calabrese for bowel blockage, lots of scar tissue.    Review of Systems:     Review of Systems   Constitutional: Negative.    HENT: Negative.     Respiratory: Negative.     Cardiovascular: Negative.    Gastrointestinal:  Positive for abdominal distention, abdominal pain, constipation and vomiting. Negative for blood in stool.   Musculoskeletal:  Positive for back pain.   Skin: Negative.    Neurological: Negative.    Psychiatric/Behavioral: Negative.       Allergies and Meds:   Review of patient's allergies indicates:  No Known Allergies     Current Outpatient Medications:     lisinopriL (PRINIVIL,ZESTRIL) 20 MG tablet, Take 20 mg by mouth 2 (two) times daily.,  Disp: , Rfl:     loratadine (CLARITIN) 10 mg tablet, Take 1 tablet (10 mg total) by mouth once daily., Disp: 30 tablet, Rfl: 1    lubiprostone (AMITIZA) 24 MCG Cap, Take 24 mcg by mouth 2 (two) times daily., Disp: , Rfl:     nitrofurantoin, macrocrystal-monohydrate, (MACROBID) 100 MG capsule, Take 100 mg by mouth 2 (two) times daily as needed., Disp: , Rfl:     ondansetron (ZOFRAN-ODT) 4 MG TbDL, Take 2 tablets (8 mg total) by mouth every 8 (eight) hours as needed (nausea, vomiting)., Disp: 12 tablet, Rfl: 0    Medical History:     Past Medical History:   Diagnosis Date    H/O atrioventricular jocy ablation     Hypertension     Small bowel obstruction 2022      Social History     Tobacco Use   Smoking Status Never    Passive exposure: Never   Smokeless Tobacco Never      Past Surgical History:   Procedure Laterality Date    AUGMENTATION OF BREAST      CARDIAC ELECTROPHYSIOLOGY MAPPING AND ABLATION       SECTION      DIAGNOSTIC LAPAROSCOPY N/A 2022    Procedure: LAPAROSCOPY, DIAGNOSTIC;  Surgeon: Alexis Calabrese MD;  Location: [facility] OR;  Service: General;  Laterality: N/A;  LYSIS OF ADHESIONS    LEFT ARM SURGERY      LEFT LEG SURGERY      JOSE AND SCREWS    ROBOT-ASSISTED LAPAROSCOPIC HYSTERECTOMY N/A 2021    Procedure: ROBOTIC HYSTERECTOMY,POSS BSO,CYSTO ;  Surgeon: Young Jackson MD;  Location: [facility] OR;  Service: OB/GYN;  Laterality: N/A;    ROBOT-ASSISTED LAPAROSCOPIC SALPINGO-OOPHORECTOMY Left 2021    Procedure: ROBOTIC SALPINGO-OOPHORECTOMY;  Surgeon: Young Jackson MD;  Location: [facility] OR;  Service: OB/GYN;  Laterality: Left;    TUBAL LIGATION      VAGINAL DELIVERY      X 2      Immunization History   Administered Date(s) Administered    COVID-19, MRNA, LN-S, PF (MODERNA FULL 0.5 ML DOSE) 2021, 2021        Objective:     Wt Readings from Last 3 Encounters:   23 0724 73 kg (161 lb)   23 0913 74.1 kg (163 lb 6.4 oz)   23 1731 72.6  "kg (160 lb)       /89 (BP Location: Left arm, Patient Position: Sitting, BP Method: Medium (Automatic))   Pulse 65   Temp 97.6 °F (36.4 °C) (Oral)   Resp 20   Ht 5' 6" (1.676 m)   Wt 73 kg (161 lb)   LMP 11/22/2021   SpO2 96%   BMI 25.99 kg/m²   Body mass index is 25.99 kg/m².     Physical Exam:    Physical Exam  Vitals and nursing note reviewed.   Constitutional:       Appearance: Normal appearance.   HENT:      Head: Normocephalic.   Eyes:      Conjunctiva/sclera: Conjunctivae normal.   Cardiovascular:      Rate and Rhythm: Normal rate and regular rhythm.      Heart sounds: Normal heart sounds.   Pulmonary:      Effort: Pulmonary effort is normal.      Breath sounds: Normal breath sounds.   Abdominal:      General: Bowel sounds are normal.      Palpations: Abdomen is soft.      Tenderness: There is abdominal tenderness (generalized). There is no guarding.   Lymphadenopathy:      Cervical: No cervical adenopathy.   Skin:     General: Skin is warm and dry.   Neurological:      Mental Status: She is alert and oriented to person, place, and time.       Assessment and Plan:        1. Other constipation  Comments:  not controlled  6 boxes samples Linzess 290 mcg ([id number] exp 07/2023) given to take 1 capsule 30 min before meal on empty stomach  Refer back to local GI.  Overview:  since bowel obstruction in Dec 2021    Orders:  -     Ambulatory referral/consult to Gastroenterology; Future; Expected date: 07/31/2023    2. Abdominal pain, unspecified abdominal location  -     Ambulatory referral/consult to Gastroenterology; Future; Expected date: 07/31/2023       F/u as needed or if symptoms worsen or persist.      Signature: Michelle Yañez [facility]-BC    "

## 2023-09-11 PROBLEM — K62.5 RECTAL BLEEDING: Status: RESOLVED | Noted: 2023-06-06 | Resolved: 2023-09-11

## 2023-11-13 ENCOUNTER — OFFICE VISIT (OUTPATIENT)
Dept: INTERNAL MEDICINE | Facility: CLINIC | Age: 48
End: 2023-11-13
Payer: MEDICAID

## 2023-11-13 VITALS
HEIGHT: 66 IN | RESPIRATION RATE: 16 BRPM | BODY MASS INDEX: 26.03 KG/M2 | SYSTOLIC BLOOD PRESSURE: 128 MMHG | HEART RATE: 62 BPM | WEIGHT: 162 LBS | DIASTOLIC BLOOD PRESSURE: 86 MMHG | OXYGEN SATURATION: 97 %

## 2023-11-13 DIAGNOSIS — M15.9 OSTEOARTHRITIS OF MULTIPLE JOINTS, UNSPECIFIED OSTEOARTHRITIS TYPE: ICD-10-CM

## 2023-11-13 DIAGNOSIS — J30.2 SEASONAL ALLERGIES: ICD-10-CM

## 2023-11-13 DIAGNOSIS — I10 ESSENTIAL HYPERTENSION: Primary | ICD-10-CM

## 2023-11-13 DIAGNOSIS — Z86.79 HISTORY OF PALPITATIONS IN ADULTHOOD: ICD-10-CM

## 2023-11-13 DIAGNOSIS — D50.0 IRON DEFICIENCY ANEMIA DUE TO CHRONIC BLOOD LOSS: ICD-10-CM

## 2023-11-13 PROCEDURE — 3074F PR MOST RECENT SYSTOLIC BLOOD PRESSURE < 130 MM HG: ICD-10-PCS | Mod: CPTII,,, | Performed by: INTERNAL MEDICINE

## 2023-11-13 PROCEDURE — 1160F RVW MEDS BY RX/DR IN RCRD: CPT | Mod: CPTII,,, | Performed by: INTERNAL MEDICINE

## 2023-11-13 PROCEDURE — 99214 PR OFFICE/OUTPT VISIT, EST, LEVL IV, 30-39 MIN: ICD-10-PCS | Mod: S$PBB,,, | Performed by: INTERNAL MEDICINE

## 2023-11-13 PROCEDURE — 99214 OFFICE O/P EST MOD 30 MIN: CPT | Mod: S$PBB,,, | Performed by: INTERNAL MEDICINE

## 2023-11-13 PROCEDURE — 1160F PR REVIEW ALL MEDS BY PRESCRIBER/CLIN PHARMACIST DOCUMENTED: ICD-10-PCS | Mod: CPTII,,, | Performed by: INTERNAL MEDICINE

## 2023-11-13 PROCEDURE — 3079F PR MOST RECENT DIASTOLIC BLOOD PRESSURE 80-89 MM HG: ICD-10-PCS | Mod: CPTII,,, | Performed by: INTERNAL MEDICINE

## 2023-11-13 PROCEDURE — 1159F PR MEDICATION LIST DOCUMENTED IN MEDICAL RECORD: ICD-10-PCS | Mod: CPTII,,, | Performed by: INTERNAL MEDICINE

## 2023-11-13 PROCEDURE — 1159F MED LIST DOCD IN RCRD: CPT | Mod: CPTII,,, | Performed by: INTERNAL MEDICINE

## 2023-11-13 PROCEDURE — 3008F PR BODY MASS INDEX (BMI) DOCUMENTED: ICD-10-PCS | Mod: CPTII,,, | Performed by: INTERNAL MEDICINE

## 2023-11-13 PROCEDURE — 3008F BODY MASS INDEX DOCD: CPT | Mod: CPTII,,, | Performed by: INTERNAL MEDICINE

## 2023-11-13 PROCEDURE — 3074F SYST BP LT 130 MM HG: CPT | Mod: CPTII,,, | Performed by: INTERNAL MEDICINE

## 2023-11-13 PROCEDURE — 3079F DIAST BP 80-89 MM HG: CPT | Mod: CPTII,,, | Performed by: INTERNAL MEDICINE

## 2023-11-13 PROCEDURE — 99214 OFFICE O/P EST MOD 30 MIN: CPT | Mod: PBBFAC | Performed by: INTERNAL MEDICINE

## 2023-11-13 RX ORDER — BISOPROLOL FUMARATE 5 MG/1
5 TABLET, FILM COATED ORAL DAILY
Qty: 90 TABLET | Refills: 31 | Status: SHIPPED | OUTPATIENT
Start: 2023-11-13 | End: 2024-04-01

## 2023-11-13 RX ORDER — LISINOPRIL 20 MG/1
20 TABLET ORAL 2 TIMES DAILY
Qty: 90 TABLET | Refills: 3 | Status: SHIPPED | OUTPATIENT
Start: 2023-11-13

## 2023-11-13 RX ORDER — LINACLOTIDE 145 UG/1
145 CAPSULE, GELATIN COATED ORAL
COMMUNITY

## 2023-11-13 NOTE — PROGRESS NOTES
Subjective:       Patient ID: Josefina Hines is a 48 y.o. female.    Chief Complaint: Follow-up and Headache    Out of meds needs rx  mid  gyn w/u  for problems in daniel    Follow-up  Associated symptoms include headaches. Pertinent negatives include no abdominal pain, chest pain, fatigue, rash or weakness.   Headache   Pertinent negatives include no abdominal pain, dizziness, ear pain or weakness.   Review of Systems   Constitutional:  Negative for fatigue and unexpected weight change.   HENT:  Negative for ear pain and goiter.    Respiratory:  Negative for chest tightness and shortness of breath.    Cardiovascular:  Negative for chest pain, palpitations and leg swelling.   Gastrointestinal:  Negative for abdominal pain and reflux.   Integumentary:  Negative for rash and breast tenderness.   Neurological:  Positive for headaches. Negative for dizziness and weakness.   Hematological:  Negative for adenopathy.   Psychiatric/Behavioral:  Negative for behavioral problems.    Breast: Negative for tenderness      Objective:      Physical Exam  Constitutional:       Appearance: Normal appearance.   HENT:      Head: Normocephalic and atraumatic.      Right Ear: External ear normal.      Left Ear: External ear normal.      Mouth/Throat:      Pharynx: Oropharynx is clear.   Eyes:      Extraocular Movements: Extraocular movements intact.      Pupils: Pupils are equal, round, and reactive to light.   Cardiovascular:      Rate and Rhythm: Normal rate and regular rhythm.      Pulses: Normal pulses.      Heart sounds: Normal heart sounds.   Pulmonary:      Effort: Pulmonary effort is normal.      Breath sounds: Normal breath sounds.   Abdominal:      General: Abdomen is flat. Bowel sounds are normal.      Palpations: Abdomen is soft.   Musculoskeletal:         General: Normal range of motion.      Cervical back: Normal range of motion and neck supple.   Skin:     General: Skin is warm.      Capillary Refill: Capillary refill  takes less than 2 seconds.   Neurological:      General: No focal deficit present.      Mental Status: She is alert.   Psychiatric:         Mood and Affect: Mood normal.         Thought Content: Thought content normal.         Judgment: Judgment normal.       Assessment:       1. Essential hypertension    2. Osteoarthritis of multiple joints, unspecified osteoarthritis type    3. History of palpitations in adulthood    4. Iron deficiency anemia due to chronic blood loss    5. Seasonal allergies        Plan:         Patient Instructions   Continue current meds and f/u 6 months      Problem List Items Addressed This Visit          Oncology    Iron deficiency anemia due to chronic blood loss     Other Visit Diagnoses       Essential hypertension    -  Primary    Osteoarthritis of multiple joints, unspecified osteoarthritis type        History of palpitations in adulthood        Seasonal allergies

## 2024-01-04 ENCOUNTER — OFFICE VISIT (OUTPATIENT)
Dept: FAMILY MEDICINE | Facility: CLINIC | Age: 49
End: 2024-01-04
Payer: MEDICAID

## 2024-01-04 VITALS
HEIGHT: 67 IN | DIASTOLIC BLOOD PRESSURE: 80 MMHG | BODY MASS INDEX: 26.21 KG/M2 | RESPIRATION RATE: 18 BRPM | HEART RATE: 82 BPM | WEIGHT: 167 LBS | OXYGEN SATURATION: 100 % | SYSTOLIC BLOOD PRESSURE: 160 MMHG | TEMPERATURE: 98 F

## 2024-01-04 DIAGNOSIS — Z11.59 ENCOUNTER FOR SCREENING FOR VIRAL DISEASE: ICD-10-CM

## 2024-01-04 DIAGNOSIS — R05.1 ACUTE COUGH: ICD-10-CM

## 2024-01-04 DIAGNOSIS — J20.9 ACUTE BRONCHITIS, UNSPECIFIED ORGANISM: ICD-10-CM

## 2024-01-04 DIAGNOSIS — J10.1 INFLUENZA A: Primary | ICD-10-CM

## 2024-01-04 LAB
CTP QC/QA: YES
CTP QC/QA: YES
FLUAV AG NPH QL: POSITIVE
FLUBV AG NPH QL: NEGATIVE
SARS-COV-2 AG RESP QL IA.RAPID: NEGATIVE

## 2024-01-04 PROCEDURE — 3079F DIAST BP 80-89 MM HG: CPT | Mod: CPTII,,, | Performed by: NURSE PRACTITIONER

## 2024-01-04 PROCEDURE — 87804 INFLUENZA ASSAY W/OPTIC: CPT | Mod: 59,QW,RHCUB | Performed by: NURSE PRACTITIONER

## 2024-01-04 PROCEDURE — 3008F BODY MASS INDEX DOCD: CPT | Mod: CPTII,,, | Performed by: NURSE PRACTITIONER

## 2024-01-04 PROCEDURE — 1159F MED LIST DOCD IN RCRD: CPT | Mod: CPTII,,, | Performed by: NURSE PRACTITIONER

## 2024-01-04 PROCEDURE — 87426 SARSCOV CORONAVIRUS AG IA: CPT | Mod: RHCUB | Performed by: NURSE PRACTITIONER

## 2024-01-04 PROCEDURE — 99213 OFFICE O/P EST LOW 20 MIN: CPT | Mod: ,,, | Performed by: NURSE PRACTITIONER

## 2024-01-04 PROCEDURE — 3077F SYST BP >= 140 MM HG: CPT | Mod: CPTII,,, | Performed by: NURSE PRACTITIONER

## 2024-01-04 RX ORDER — ALBUTEROL SULFATE 90 UG/1
2 AEROSOL, METERED RESPIRATORY (INHALATION) EVERY 6 HOURS PRN
Qty: 8.5 G | Refills: 0 | Status: SHIPPED | OUTPATIENT
Start: 2024-01-04 | End: 2025-01-03

## 2024-01-04 RX ORDER — OSELTAMIVIR PHOSPHATE 75 MG/1
75 CAPSULE ORAL 2 TIMES DAILY
Qty: 10 CAPSULE | Refills: 0 | Status: SHIPPED | OUTPATIENT
Start: 2024-01-04 | End: 2024-01-09

## 2024-01-04 RX ORDER — PROMETHAZINE HYDROCHLORIDE AND DEXTROMETHORPHAN HYDROBROMIDE 6.25; 15 MG/5ML; MG/5ML
5 SYRUP ORAL EVERY 6 HOURS PRN
Qty: 240 ML | Refills: 0 | Status: SHIPPED | OUTPATIENT
Start: 2024-01-04 | End: 2024-01-14

## 2024-01-04 NOTE — PROGRESS NOTES
Subjective:       Patient ID: Josefina Hines is a 48 y.o. female.    Chief Complaint: Fever (X 2 days), Cough (X 2 days), and Generalized Body Aches (X 2 days)    Fever (X 2 days), Cough (X 2 days), and Generalized Body Aches (X 2 days)      Fever   Associated symptoms include coughing. Pertinent negatives include no abdominal pain, chest pain, congestion, ear pain, headaches, nausea, rash, sore throat or vomiting.   Cough  Associated symptoms include a fever and myalgias. Pertinent negatives include no chest pain, ear pain, headaches, rash, sore throat or shortness of breath.     Review of Systems   Constitutional:  Positive for fever. Negative for appetite change and fatigue.   HENT:  Negative for nasal congestion, ear pain and sore throat.    Eyes:  Negative for pain, discharge and itching.   Respiratory:  Positive for cough. Negative for shortness of breath.    Cardiovascular:  Negative for chest pain and leg swelling.   Gastrointestinal:  Negative for abdominal pain, change in bowel habit, nausea and vomiting.   Musculoskeletal:  Positive for myalgias. Negative for back pain, gait problem and neck pain.   Integumentary:  Negative for rash and wound.   Allergic/Immunologic: Negative for immunocompromised state.   Neurological:  Negative for dizziness, weakness and headaches.   All other systems reviewed and are negative.        Objective:      Physical Exam  Vitals and nursing note reviewed.   Constitutional:       General: She is not in acute distress.     Appearance: Normal appearance. She is not ill-appearing, toxic-appearing or diaphoretic.   HENT:      Head: Normocephalic.      Right Ear: Tympanic membrane, ear canal and external ear normal.      Left Ear: Tympanic membrane, ear canal and external ear normal.      Nose: Congestion and rhinorrhea present.      Mouth/Throat:      Mouth: Mucous membranes are moist.      Pharynx: Posterior oropharyngeal erythema present. No oropharyngeal exudate.   Eyes:       General: No scleral icterus.        Right eye: No discharge.         Left eye: No discharge.      Extraocular Movements: Extraocular movements intact.      Conjunctiva/sclera: Conjunctivae normal.      Pupils: Pupils are equal, round, and reactive to light.   Cardiovascular:      Rate and Rhythm: Normal rate and regular rhythm.      Pulses: Normal pulses.      Heart sounds: Normal heart sounds. No murmur heard.  Pulmonary:      Effort: Pulmonary effort is normal. No respiratory distress.      Breath sounds: Wheezing (mild expiratory) present. No rhonchi or rales.   Musculoskeletal:         General: Normal range of motion.      Cervical back: Neck supple. No tenderness.   Lymphadenopathy:      Cervical: No cervical adenopathy.   Skin:     General: Skin is warm and dry.      Capillary Refill: Capillary refill takes less than 2 seconds.      Findings: No rash.   Neurological:      Mental Status: She is alert and oriented to person, place, and time.   Psychiatric:         Mood and Affect: Mood normal.         Behavior: Behavior normal.         Thought Content: Thought content normal.         Judgment: Judgment normal.          Assessment:       1. Influenza A    2. Encounter for screening for viral disease    3. Acute cough    4. Acute bronchitis, unspecified organism        Plan:   Influenza A  -     oseltamivir (TAMIFLU) 75 MG capsule; Take 1 capsule (75 mg total) by mouth 2 (two) times daily. for 5 days  Dispense: 10 capsule; Refill: 0    Encounter for screening for viral disease  -     SARS Coronavirus 2 Antigen, POCT  -     POCT Influenza A/B    Acute cough  -     promethazine-dextromethorphan (PROMETHAZINE-DM) 6.25-15 mg/5 mL Syrp; Take 5 mLs by mouth every 6 (six) hours as needed (cough).  Dispense: 240 mL; Refill: 0    Acute bronchitis, unspecified organism  -     albuterol (VENTOLIN HFA) 90 mcg/actuation inhaler; Inhale 2 puffs into the lungs every 6 (six) hours as needed for Wheezing or Shortness of Breath.  Rescue  Dispense: 8.5 g; Refill: 0         Risks, benefits, and side effects were discussed with the patient. All questions were answered to the fullest satisfaction of the patient, and pt verbalized understanding and agreement to treatment plan. Pt was to call with any new or worsening symptoms, or present to the ER

## 2024-01-26 ENCOUNTER — OFFICE VISIT (OUTPATIENT)
Dept: FAMILY MEDICINE | Facility: CLINIC | Age: 49
End: 2024-01-26
Payer: MEDICAID

## 2024-01-26 VITALS
HEIGHT: 67 IN | HEART RATE: 77 BPM | DIASTOLIC BLOOD PRESSURE: 81 MMHG | SYSTOLIC BLOOD PRESSURE: 132 MMHG | WEIGHT: 167 LBS | BODY MASS INDEX: 26.21 KG/M2

## 2024-01-26 DIAGNOSIS — K59.09 OTHER CONSTIPATION: Chronic | ICD-10-CM

## 2024-01-26 DIAGNOSIS — D50.0 IRON DEFICIENCY ANEMIA DUE TO CHRONIC BLOOD LOSS: Chronic | ICD-10-CM

## 2024-01-26 DIAGNOSIS — R63.5 WEIGHT GAIN: Chronic | ICD-10-CM

## 2024-01-26 DIAGNOSIS — B35.1 ONYCHOMYCOSIS OF GREAT TOE: Primary | ICD-10-CM

## 2024-01-26 DIAGNOSIS — R53.83 FATIGUE, UNSPECIFIED TYPE: Chronic | ICD-10-CM

## 2024-01-26 PROCEDURE — 84443 ASSAY THYROID STIM HORMONE: CPT | Mod: ,,, | Performed by: CLINICAL MEDICAL LABORATORY

## 2024-01-26 PROCEDURE — 85025 COMPLETE CBC W/AUTO DIFF WBC: CPT | Mod: ,,, | Performed by: CLINICAL MEDICAL LABORATORY

## 2024-01-26 PROCEDURE — 3079F DIAST BP 80-89 MM HG: CPT | Mod: CPTII,,, | Performed by: FAMILY MEDICINE

## 2024-01-26 PROCEDURE — 1160F RVW MEDS BY RX/DR IN RCRD: CPT | Mod: CPTII,,, | Performed by: FAMILY MEDICINE

## 2024-01-26 PROCEDURE — 4010F ACE/ARB THERAPY RXD/TAKEN: CPT | Mod: CPTII,,, | Performed by: FAMILY MEDICINE

## 2024-01-26 PROCEDURE — 80053 COMPREHEN METABOLIC PANEL: CPT | Mod: ,,, | Performed by: CLINICAL MEDICAL LABORATORY

## 2024-01-26 PROCEDURE — 3008F BODY MASS INDEX DOCD: CPT | Mod: CPTII,,, | Performed by: FAMILY MEDICINE

## 2024-01-26 PROCEDURE — 1159F MED LIST DOCD IN RCRD: CPT | Mod: CPTII,,, | Performed by: FAMILY MEDICINE

## 2024-01-26 PROCEDURE — 3075F SYST BP GE 130 - 139MM HG: CPT | Mod: CPTII,,, | Performed by: FAMILY MEDICINE

## 2024-01-26 PROCEDURE — 99214 OFFICE O/P EST MOD 30 MIN: CPT | Mod: ,,, | Performed by: FAMILY MEDICINE

## 2024-01-26 RX ORDER — TERBINAFINE HYDROCHLORIDE 250 MG/1
250 TABLET ORAL DAILY
Qty: 30 TABLET | Refills: 1 | Status: SHIPPED | OUTPATIENT
Start: 2024-01-26 | End: 2024-04-01

## 2024-01-26 NOTE — PROGRESS NOTES
Truong Stringer MD   [facility]  [facility] [address] [address]  [phone]      PATIENT NAME: Josefina Hines  : 1975  DATE: 24  MRN: [id number]      Billing Provider: Truong Stringer MD  Level of Service:   Patient PCP Information       Provider PCP Type    Truong Stringer MD General            Reason for Visit / Chief Complaint: Foot Pain (Right foot pain and toenail coming off. )       Update PCP  Update Chief Complaint         History of Present Illness / Problem Focused Workflow     Josefina Hines presents to the clinic with Foot Pain (Right foot pain and toenail coming off. )       Symptoms started after getting a pedicure.  Says that she did have some initial erythema and swelling around nailbed.  Later nolan became thickened, discolored and brittle.  Majority of nail has chipped and broken off.  Has pain in toe and foot.     Is also concerned because she has had unexpected weight gain since having a partial hysterectomy 2 years ago.  Still has one ovary.  Since then, she has also had problems with chronic constipation and is taking Linzess for that.  Does not have any known thyroid disease, but also complains of some depressed mood and cold intolerance.  Does have h/o anemia.  Denies any sore throat, hoarseness or difficulty swallowing.      Foot Pain  Associated symptoms include fatigue. Pertinent negatives include no abdominal pain, chest pain, chills, coughing, fever, sore throat or weakness.       Review of Systems     Review of Systems   Constitutional:  Positive for fatigue and unexpected weight change. Negative for activity change, chills and fever.   HENT:  Negative for sore throat, trouble swallowing and voice change.    Eyes:  Negative for pain.   Respiratory:  Negative for cough, chest tightness and shortness of breath.    Cardiovascular:  Negative for chest pain and palpitations.   Gastrointestinal:   Negative for abdominal pain.   Endocrine: Positive for cold intolerance.   Neurological:  Negative for dizziness, syncope and weakness.   Psychiatric/Behavioral:  Positive for dysphoric mood. Negative for confusion.         Medical / Social / Family History     Past Medical History:   Diagnosis Date    H/O atrioventricular jocy ablation     Hypertension     Small bowel obstruction 2022       Past Surgical History:   Procedure Laterality Date    AUGMENTATION OF BREAST      CARDIAC ELECTROPHYSIOLOGY MAPPING AND ABLATION       SECTION      DIAGNOSTIC LAPAROSCOPY N/A 2022    Procedure: LAPAROSCOPY, DIAGNOSTIC;  Surgeon: Alexis Calabrese MD;  Location: [facility];  Service: General;  Laterality: N/A;  LYSIS OF ADHESIONS    LEFT ARM SURGERY      LEFT LEG SURGERY      JOSE AND SCREWS    ROBOT-ASSISTED LAPAROSCOPIC HYSTERECTOMY N/A 2021    Procedure: ROBOTIC HYSTERECTOMY,POSS BSO,CYSTO ;  Surgeon: Young Jackson MD;  Location: [facility];  Service: OB/GYN;  Laterality: N/A;    ROBOT-ASSISTED LAPAROSCOPIC SALPINGO-OOPHORECTOMY Left 2021    Procedure: ROBOTIC SALPINGO-OOPHORECTOMY;  Surgeon: Young Jackson MD;  Location: [facility];  Service: OB/GYN;  Laterality: Left;    TUBAL LIGATION      VAGINAL DELIVERY      X 2       Social History    reports that she has never smoked. She has never been exposed to tobacco smoke. She has never used smokeless tobacco. She reports that she does not currently use alcohol. She reports that she does not use drugs.   Social History     Tobacco Use    Smoking status: Never     Passive exposure: Never    Smokeless tobacco: Never   Substance Use Topics    Alcohol use: Not Currently    Drug use: Never       Family History  Family History   Problem Relation Age of Onset    Colon cancer Mother     Lung cancer Mother     Heart disease Paternal Grandmother     Hypertension Father     Heart disease Father        Medications and Allergies      Medications  Outpatient Medications Marked as Taking for the 1/26/24 encounter (Office Visit) with Truong Stringer MD   Medication Sig Dispense Refill    albuterol (VENTOLIN HFA) 90 mcg/actuation inhaler Inhale 2 puffs into the lungs every 6 (six) hours as needed for Wheezing or Shortness of Breath. Rescue 8.5 g 0    bisoprolol (ZEBETA) 5 MG tablet Take 1 tablet (5 mg total) by mouth once daily. 90 tablet 31    LINZESS 145 mcg Cap capsule Take 145 mcg by mouth.      lisinopriL (PRINIVIL,ZESTRIL) 20 MG tablet Take 1 tablet (20 mg total) by mouth 2 (two) times daily. 90 tablet 3    lubiprostone (AMITIZA) 24 MCG Cap Take 24 mcg by mouth 2 (two) times daily.         Allergies  Review of patient's allergies indicates:  No Known Allergies    Physical Examination     Vitals:    01/26/24 1418   BP: 132/81   Pulse: 77     Physical Exam  Vitals reviewed.   Constitutional:       Appearance: Normal appearance.   HENT:      Head: Normocephalic and atraumatic.   Eyes:      Extraocular Movements: Extraocular movements intact.      Conjunctiva/sclera: Conjunctivae normal.      Pupils: Pupils are equal, round, and reactive to light.   Cardiovascular:      Rate and Rhythm: Normal rate and regular rhythm.      Heart sounds: Normal heart sounds.   Pulmonary:      Effort: Pulmonary effort is normal.      Breath sounds: Normal breath sounds.   Musculoskeletal:         General: Normal range of motion.      Cervical back: Normal range of motion.   Skin:     General: Skin is warm and dry.      Comments: She has dry skin on feet and LE.  There is thickening and brownish discoloration of nail on right great toe with with significant pitting of nail    Neurological:      General: No focal deficit present.      Mental Status: She is alert and oriented to person, place, and time.   Psychiatric:         Mood and Affect: Mood normal.         Behavior: Behavior normal.        Component Ref Range & Units 7 mo ago  (6/6/23) 2 yr ago  (1/12/22)  2 yr ago  (1/11/22) 2 yr ago  (1/10/22) 2 yr ago  (1/9/22) 2 yr ago  (1/8/22) 2 yr ago  (1/7/22)   Sodium 136 - 145 mmol/L 138 137 137 137 139 141 140   Potassium 3.5 - 5.1 mmol/L 3.4 Low  3.8 3.6 3.3 Low  3.4 Low  3.7 3.1 Low    Chloride 98 - 107 mmol/L 110 High  106 106 107 107 108 High  108 High    CO2 21 - 32 mmol/L 28 22 22 22 22 24 22   Anion Gap 7 - 16 mmol/L 3 Low  13 13 11 13 13 13   Glucose 74 - 106 mg/dL 89 84 105 119 High  114 High  104 135 High    BUN 7 - 18 mg/dL 12 4 Low  5 Low  6 Low  6 Low  5 Low  3 Low    Creatinine 0.55 - 1.02 mg/dL 0.81 0.63 0.65 0.67 0.67 0.61 0.61   BUN/Creatinine Ratio 6 - 20 15 6 8 9 9 8 5 Low    Calcium 8.5 - 10.1 mg/dL 9.0 8.0 Low  7.8 Low  7.9 Low  8.0 Low  7.7 Low  7.6 Low    Total Protein 6.4 - 8.2 g/dL 7.0  5.6 Low        Albumin 3.5 - 5.0 g/dL 3.6  2.5 Low        Globulin 2.0 - 4.0 g/dL 3.4  3.1       A/G Ratio  1.1  0.8       Bilirubin, Total >0.0 - 1.2 mg/dL 0.9  0.3 R       Alk Phos 39 - 100 U/L 66  79       ALT 13 - 56 U/L 39  37       AST 15 - 37 U/L 37  24       eGFR >=60 mL/min/1.73m2 90         Resulting Agency  [facility] OUTREACH [facility] RF[facility] RF[facility] RF[facility] RF[facility]ORE RF[facility]              Specimen Collected: 06/06/23 10:04 CDT Last Resulted: 06/06/23 12:30 CDT           Component Ref Range & Units 7 mo ago  (6/6/23) 2 yr ago  (1/12/22) 2 yr ago  (1/11/22) 2 yr ago  (1/11/22) 2 yr ago  (1/10/22) 2 yr ago  (1/10/22) 2 yr ago  (1/9/22) 2 yr ago  (1/9/22)   WBC 4.50 - 11.00 K/uL 5.79 5.13  5.00  3.93 Low   2.90 Low    RBC 4.20 - 5.40 M/uL 4.42 3.83 Low   3.68 Low   3.54 Low   3.62 Low    Hemoglobin 12.0 - 16.0 g/dL 13.2 9.3 Low   9.1 Low   9.0 Low   8.9 Low    Hematocrit 38.0 - 47.0 % 39.9 28.9 Low   27.7 Low   28.3 Low   29.2 Low    MCV 80.0 - 96.0 fL 90.3 75.5 Low   75.3 Low   79.9 Low   80.7   MCH 27.0 - 31.0 pg 29.9 24.3 Low   24.7 Low   25.4 Low   24.6 Low    MCHC 32.0 - 36.0 g/dL 33.1 32.2  32.9  31.8 Low   30.5 Low    RDW 11.5 - 14.5 % 13.3 16.1 High    16.2 High   16.4 High   16.2 High    Platelet Count 150 - 400 K/uL 225 210  254  267  218   MPV 9.4 - 12.4 fL 9.6 10.8  10.4  10.0  9.6   Neutrophils % 53.0 - 65.0 % 63.8 53.2  54.6  49.9 Low   53.8   Lymphocytes % 27.0 - 41.0 % 26.8 Low  28.7 25 Low  R 22.6 Low  25 Low  R 23.2 Low  26 Low  R 22.8 Low    Monocytes % 2.0 - 6.0 % 7.3 High  13.6 High  18 High  R 17.8 High  15 High  R 20.4 High  14 High  R 18.6 High    Eosinophils % 1.0 - 4.0 % 1.7 4.1 High  3 R 4.6 High  7 High  R 5.9 High  3 R 4.5 High    Basophils % 0.0 - 1.0 % 0.2 0.2  0.2  0.3  0.3   Immature Granulocytes % 0.0 - 0.4 % 0.2 0.2  0.2  0.3  0.0   nRBC, Auto <=0.0 % 0.0 0.0  0.0  0.0  0.0   Neutrophils, Abs 1.80 - 7.70 K/uL 3.70 2.73  2.73  1.97  1.56 Low    Lymphocytes, Absolute 1.00 - 4.80 K/uL 1.55 1.47  1.13  0.91 Low   0.66 Low    Monocytes, Absolute 0.00 - 0.80 K/uL 0.42 0.70  0.89 High   0.80  0.54   Eosinophils, Absolute 0.00 - 0.50 K/uL 0.10 0.21  0.23  0.23  0.13   Basophils, Absolute 0.00 - 0.20 K/uL 0.01 0.01  0.01  0.01  0.01   Immature Granulocytes, Absolute 0.00 - 0.04 K/uL 0.01 0.01  0.01  0.01  0.00   nRBC, Absolute <=0.00 x10e3/uL 0.00 0.00  0.00  0.00  0.00   Diff Type  Auto Auto  Manual  Manual  Manual   Resulting Agency  [facility] OUTREACH [facility] RF[facility] RF[facility]ORE RF[facility]ORE RF[facility] RF[facility]ORE RF[facility]ORE              Specimen Collected: 06/06/23 10:04 CDT Last Resulted: 06/06/23 11:03 CDT             Assessment and Plan (including Health Maintenance)      Problem List  Smart Sets  Document Outside HM   :    Plan: will get some labs today to screen for thyroid disease and anemia.  Also check liver enzymes since will be starting on terbinafine.          Health Maintenance Due   Topic Date Due    Hepatitis C Screening  Never done    Lipid Panel  Never done    HIV Screening  Never done    TETANUS VACCINE  Never done    Mammogram  Never done    Hemoglobin A1c (Diabetic Prevention Screening)  Never done    Influenza Vaccine (1) Never done     COVID-19 Vaccine (3 - 2023-24 season) 09/01/2023       Problem List Items Addressed This Visit          Oncology    Iron deficiency anemia due to chronic blood loss    Relevant Orders    CBC Auto Differential       GI    Other constipation (Chronic)    Overview     since bowel obstruction in Dec 2021          Other Visit Diagnoses       Onychomycosis of great toe    -  Primary    right foot    Relevant Medications    terbinafine HCL (LAMISIL) 250 mg tablet    Weight gain  (Chronic)       Relevant Orders    TSH    CBC Auto Differential    Comprehensive Metabolic Panel    Fatigue, unspecified type  (Chronic)       Relevant Orders    TSH    CBC Auto Differential    Comprehensive Metabolic Panel            Health Maintenance Topics with due status: Not Due       Topic Last Completion Date    Colorectal Cancer Screening 04/12/2022       Future Appointments   Date Time Provider Department Center   5/13/2024  8:45 AM Mark Dean MD [facility]            Signature:  MD ANGELO Ochoa [facility]  [facility] [place] [facility]  [address] [place] [address] [place] [address]  [phone]    Date of encounter: 1/26/24

## 2024-01-27 LAB
ALBUMIN SERPL BCP-MCNC: 3.8 G/DL (ref 3.5–5)
ALBUMIN/GLOB SERPL: 1.2 {RATIO}
ALP SERPL-CCNC: 62 U/L (ref 39–100)
ALT SERPL W P-5'-P-CCNC: 24 U/L (ref 13–56)
ANION GAP SERPL CALCULATED.3IONS-SCNC: 11 MMOL/L (ref 7–16)
AST SERPL W P-5'-P-CCNC: 18 U/L (ref 15–37)
BASOPHILS # BLD AUTO: 0.01 K/UL (ref 0–0.2)
BASOPHILS NFR BLD AUTO: 0.2 % (ref 0–1)
BILIRUB SERPL-MCNC: 0.5 MG/DL (ref ?–1.2)
BUN SERPL-MCNC: 15 MG/DL (ref 7–18)
BUN/CREAT SERPL: 18 (ref 6–20)
CALCIUM SERPL-MCNC: 9.6 MG/DL (ref 8.5–10.1)
CHLORIDE SERPL-SCNC: 110 MMOL/L (ref 98–107)
CO2 SERPL-SCNC: 22 MMOL/L (ref 21–32)
CREAT SERPL-MCNC: 0.85 MG/DL (ref 0.55–1.02)
DIFFERENTIAL METHOD BLD: ABNORMAL
EGFR (NO RACE VARIABLE) (RUSH/TITUS): 84 ML/MIN/1.73M2
EOSINOPHIL # BLD AUTO: 0.05 K/UL (ref 0–0.5)
EOSINOPHIL NFR BLD AUTO: 0.8 % (ref 1–4)
ERYTHROCYTE [DISTWIDTH] IN BLOOD BY AUTOMATED COUNT: 12.5 % (ref 11.5–14.5)
GLOBULIN SER-MCNC: 3.3 G/DL (ref 2–4)
GLUCOSE SERPL-MCNC: 84 MG/DL (ref 74–106)
HCT VFR BLD AUTO: 40.6 % (ref 38–47)
HGB BLD-MCNC: 13.7 G/DL (ref 12–16)
IMM GRANULOCYTES # BLD AUTO: 0.01 K/UL (ref 0–0.04)
IMM GRANULOCYTES NFR BLD: 0.2 % (ref 0–0.4)
LYMPHOCYTES # BLD AUTO: 1.69 K/UL (ref 1–4.8)
LYMPHOCYTES NFR BLD AUTO: 28.3 % (ref 27–41)
MCH RBC QN AUTO: 30.7 PG (ref 27–31)
MCHC RBC AUTO-ENTMCNC: 33.7 G/DL (ref 32–36)
MCV RBC AUTO: 91 FL (ref 80–96)
MONOCYTES # BLD AUTO: 0.41 K/UL (ref 0–0.8)
MONOCYTES NFR BLD AUTO: 6.9 % (ref 2–6)
MPC BLD CALC-MCNC: 10.6 FL (ref 9.4–12.4)
NEUTROPHILS # BLD AUTO: 3.81 K/UL (ref 1.8–7.7)
NEUTROPHILS NFR BLD AUTO: 63.6 % (ref 53–65)
NRBC # BLD AUTO: 0 X10E3/UL
NRBC, AUTO (.00): 0 %
PLATELET # BLD AUTO: 258 K/UL (ref 150–400)
POTASSIUM SERPL-SCNC: 4.1 MMOL/L (ref 3.5–5.1)
PROT SERPL-MCNC: 7.1 G/DL (ref 6.4–8.2)
RBC # BLD AUTO: 4.46 M/UL (ref 4.2–5.4)
SODIUM SERPL-SCNC: 139 MMOL/L (ref 136–145)
TSH SERPL DL<=0.005 MIU/L-ACNC: 1.29 UIU/ML (ref 0.36–3.74)
WBC # BLD AUTO: 5.98 K/UL (ref 4.5–11)

## 2024-04-01 ENCOUNTER — OFFICE VISIT (OUTPATIENT)
Dept: FAMILY MEDICINE | Facility: CLINIC | Age: 49
End: 2024-04-01
Payer: COMMERCIAL

## 2024-04-01 VITALS
OXYGEN SATURATION: 95 % | TEMPERATURE: 97 F | WEIGHT: 158 LBS | SYSTOLIC BLOOD PRESSURE: 116 MMHG | DIASTOLIC BLOOD PRESSURE: 80 MMHG | RESPIRATION RATE: 20 BRPM | HEIGHT: 67 IN | HEART RATE: 60 BPM | BODY MASS INDEX: 24.8 KG/M2

## 2024-04-01 DIAGNOSIS — R09.81 NASAL CONGESTION: Primary | ICD-10-CM

## 2024-04-01 DIAGNOSIS — J02.9 SORE THROAT: ICD-10-CM

## 2024-04-01 DIAGNOSIS — Z20.818 STREPTOCOCCUS EXPOSURE: ICD-10-CM

## 2024-04-01 PROBLEM — J20.9 ACUTE BRONCHITIS: Status: RESOLVED | Noted: 2024-01-04 | Resolved: 2024-04-01

## 2024-04-01 PROBLEM — Z11.59 ENCOUNTER FOR SCREENING FOR VIRAL DISEASE: Status: RESOLVED | Noted: 2024-01-04 | Resolved: 2024-04-01

## 2024-04-01 PROBLEM — R05.1 ACUTE COUGH: Status: RESOLVED | Noted: 2024-01-04 | Resolved: 2024-04-01

## 2024-04-01 PROBLEM — J10.1 INFLUENZA A: Status: RESOLVED | Noted: 2024-01-04 | Resolved: 2024-04-01

## 2024-04-01 PROBLEM — N32.81 OVERACTIVE BLADDER: Status: ACTIVE | Noted: 2023-09-26

## 2024-04-01 LAB
CTP QC/QA: YES
MOLECULAR STREP A: NEGATIVE
POC MOLECULAR INFLUENZA A AGN: NEGATIVE
POC MOLECULAR INFLUENZA B AGN: NEGATIVE
SARS-COV-2 RDRP RESP QL NAA+PROBE: NEGATIVE

## 2024-04-01 PROCEDURE — 87651 STREP A DNA AMP PROBE: CPT | Mod: QW,,, | Performed by: NURSE PRACTITIONER

## 2024-04-01 PROCEDURE — 99214 OFFICE O/P EST MOD 30 MIN: CPT | Mod: 25,,, | Performed by: NURSE PRACTITIONER

## 2024-04-01 PROCEDURE — 3079F DIAST BP 80-89 MM HG: CPT | Mod: ,,, | Performed by: NURSE PRACTITIONER

## 2024-04-01 PROCEDURE — 87635 SARS-COV-2 COVID-19 AMP PRB: CPT | Mod: QW,,, | Performed by: NURSE PRACTITIONER

## 2024-04-01 PROCEDURE — 1159F MED LIST DOCD IN RCRD: CPT | Mod: ,,, | Performed by: NURSE PRACTITIONER

## 2024-04-01 PROCEDURE — 87502 INFLUENZA DNA AMP PROBE: CPT | Mod: QW,,, | Performed by: NURSE PRACTITIONER

## 2024-04-01 PROCEDURE — 4010F ACE/ARB THERAPY RXD/TAKEN: CPT | Mod: ,,, | Performed by: NURSE PRACTITIONER

## 2024-04-01 PROCEDURE — 3074F SYST BP LT 130 MM HG: CPT | Mod: ,,, | Performed by: NURSE PRACTITIONER

## 2024-04-01 PROCEDURE — 96372 THER/PROPH/DIAG INJ SC/IM: CPT | Mod: ,,, | Performed by: NURSE PRACTITIONER

## 2024-04-01 PROCEDURE — 3008F BODY MASS INDEX DOCD: CPT | Mod: ,,, | Performed by: NURSE PRACTITIONER

## 2024-04-01 PROCEDURE — 1160F RVW MEDS BY RX/DR IN RCRD: CPT | Mod: ,,, | Performed by: NURSE PRACTITIONER

## 2024-04-01 NOTE — PROGRESS NOTES
NEREIDA Gupta        PATIENT NAME: Josefina Angelo  : 1975  DATE: 24  MRN: [id number]      Patient PCP Information       Provider PCP Type    Primary Doctor No General            Reason for Visit / Chief Complaint: Otalgia, Sore Throat, and Epistaxis (Would like shot. )       History of Present Illness / Problem Focused Workflow     Josefina Angelo presents to the clinic with Otalgia, Sore Throat, and Epistaxis (Would like shot. )     Otalgia   Associated symptoms include a sore throat. Pertinent negatives include no abdominal pain, coughing, diarrhea, headaches, hearing loss or rash.   Sore Throat   Associated symptoms include ear pain. Pertinent negatives include no abdominal pain, congestion, coughing, diarrhea, headaches or shortness of breath.   Epistaxis       Ms Angelo presents to clinic with ear pain, sore throat and nasal congestion.   States grandchild was positive for strep and was exposed over the weekend.   Began feeling unwell last night.     Review of Systems     Review of Systems   Constitutional:  Negative for activity change, appetite change, chills, diaphoresis, fatigue, fever and unexpected weight change.   HENT:  Positive for ear pain, nosebleeds and sore throat. Negative for congestion, facial swelling and hearing loss.    Eyes: Negative.    Respiratory:  Negative for apnea, cough, shortness of breath and wheezing.    Cardiovascular:  Negative for chest pain, palpitations and leg swelling.   Gastrointestinal:  Negative for abdominal distention, abdominal pain, blood in stool, constipation, diarrhea and nausea.   Endocrine: Negative for cold intolerance, heat intolerance, polydipsia, polyphagia and polyuria.   Genitourinary:  Negative for decreased urine volume, difficulty urinating, dysuria, flank pain, frequency, hematuria and urgency.   Musculoskeletal:  Negative for arthralgias, joint swelling and myalgias.   Skin:  Negative for color change and rash.    Allergic/Immunologic: Negative.    Neurological:  Negative for dizziness, tremors, seizures, syncope, facial asymmetry, speech difficulty, weakness, light-headedness, numbness and headaches.   Hematological:  Negative for adenopathy. Does not bruise/bleed easily.   Psychiatric/Behavioral:  Negative for behavioral problems and confusion.        Medical / Social / Family History     Past Medical History:   Diagnosis Date    H/O atrioventricular jocy ablation     Hypertension     Paroxysmal A-fib     Small bowel obstruction 2022   Dr Hansen cardiologist    Past Surgical History:   Procedure Laterality Date    AUGMENTATION OF BREAST      CARDIAC ELECTROPHYSIOLOGY MAPPING AND ABLATION       SECTION      DIAGNOSTIC LAPAROSCOPY N/A 2022    Procedure: LAPAROSCOPY, DIAGNOSTIC;  Surgeon: Alexis Calabrese MD;  Location: [facility];  Service: General;  Laterality: N/A;  LYSIS OF ADHESIONS    LEFT ARM SURGERY      LEFT LEG SURGERY      JOSE AND SCREWS    ROBOT-ASSISTED LAPAROSCOPIC HYSTERECTOMY N/A 2021    Procedure: ROBOTIC HYSTERECTOMY,POSS BSO,CYSTO ;  Surgeon: Young Jackson MD;  Location: [facility];  Service: OB/GYN;  Laterality: N/A;    ROBOT-ASSISTED LAPAROSCOPIC SALPINGO-OOPHORECTOMY Left 2021    Procedure: ROBOTIC SALPINGO-OOPHORECTOMY;  Surgeon: Young Jackson MD;  Location: [facility];  Service: OB/GYN;  Laterality: Left;    TUBAL LIGATION      VAGINAL DELIVERY      X 2       Social History  Ms.  reports that she has never smoked. She has never been exposed to tobacco smoke. She has never used smokeless tobacco. She reports that she does not currently use alcohol. She reports that she does not use drugs.    Family History  Ms.'s family history includes Colon cancer in her mother; Heart disease in her father and paternal grandmother; Hypertension in her father; Lung cancer in her mother; Lung disease in her mother.    Medications and Allergies  "    Medications  Outpatient Medications Marked as Taking for the 4/1/24 encounter (Office Visit) with Maggie Pruett FNP   Medication Sig Dispense Refill    LINZESS 145 mcg Cap capsule Take 145 mcg by mouth.      lisinopriL (PRINIVIL,ZESTRIL) 20 MG tablet Take 1 tablet (20 mg total) by mouth 2 (two) times daily. 90 tablet 3     Current Facility-Administered Medications for the 4/1/24 encounter (Office Visit) with Maggie Pruett FNP   Medication Dose Route Frequency Provider Last Rate Last Admin    [COMPLETED] penicillin G procaine-penicillin G benzathine (BICILLIN-CR) injection 1.2 Million Units  1.2 Million Units Intramuscular Once Maggie Pruett FNP   1.2 Million Units at 04/01/24 0746       Allergies  Review of patient's allergies indicates:  No Known Allergies    Physical Examination     Vitals:    04/01/24 0719   BP: 116/80   BP Location: Left arm   Patient Position: Sitting   BP Method: Medium (Automatic)   Pulse: 60   Resp: 20   Temp: 97.4 °F (36.3 °C)   TempSrc: Oral   SpO2: 95%   Weight: 71.7 kg (158 lb)   Height: 5' 7" (1.702 m)       Physical Exam  Vitals reviewed.   Constitutional:       Appearance: Normal appearance.   HENT:      Head: Normocephalic.      Right Ear: Tympanic membrane, ear canal and external ear normal.      Left Ear: Tympanic membrane, ear canal and external ear normal.      Nose: Congestion present.      Right Turbinates: Swollen.      Left Turbinates: Swollen.      Mouth/Throat:      Mouth: Mucous membranes are moist.      Pharynx: No oropharyngeal exudate or posterior oropharyngeal erythema.   Eyes:      Extraocular Movements: Extraocular movements intact.   Cardiovascular:      Rate and Rhythm: Normal rate and regular rhythm.      Pulses: Normal pulses.      Heart sounds: Normal heart sounds.   Pulmonary:      Effort: Pulmonary effort is normal. No respiratory distress.      Breath sounds: Normal breath sounds. No stridor. No wheezing, rhonchi or rales.   Chest:      " Chest wall: No tenderness.   Musculoskeletal:         General: Normal range of motion.      Cervical back: Normal range of motion.   Skin:     General: Skin is warm and dry.      Capillary Refill: Capillary refill takes less than 2 seconds.   Neurological:      General: No focal deficit present.      Mental Status: She is alert and oriented to person, place, and time.   Psychiatric:         Mood and Affect: Mood normal.         Behavior: Behavior normal.         Thought Content: Thought content normal.         Judgment: Judgment normal.           Office Visit on 04/01/2024   Component Date Value Ref Range Status    POC Rapid COVID 04/01/2024 Negative  Negative Final     Acceptable 04/01/2024 Yes   Final    POC Molecular Influenza A Ag 04/01/2024 Negative  Negative, Not Reported Final    POC Molecular Influenza B Ag 04/01/2024 Negative  Negative, Not Reported Final     Acceptable 04/01/2024 Yes   Final    Molecular Strep A, POC 04/01/2024 Negative  Negative Final     Acceptable 04/01/2024 Yes   Final             Assessment and Plan (including Health Maintenance)      Problem List  Smart Sets  Document Outside HM   :    Plan:   Nasal congestion  -     POCT COVID-19 Rapid Screening  -     POCT Influenza A/B Molecular    Sore throat  -     POCT Strep A, Molecular    Streptococcus exposure  -     penicillin G procaine-penicillin G benzathine (BICILLIN-CR) injection 1.2 Million Units     OTC zyrtec and flonase as needed for seasonal allergies   There are no Patient Instructions on file for this visit.       Health Maintenance Due   Topic Date Due    Hepatitis C Screening  Never done    Lipid Panel  Never done    HIV Screening  Never done    TETANUS VACCINE  Never done    Mammogram  Never done    Influenza Vaccine (1) Never done    COVID-19 Vaccine (3 - 2023-24 season) 09/01/2023       Most Recent Immunizations   Administered Date(s) Administered    COVID-19, MRNA, LN-S, PF  (MODERNA FULL 0.5 ML DOSE) 08/30/2021        Problem List Items Addressed This Visit    None  Visit Diagnoses       Nasal congestion    -  Primary    Relevant Orders    POCT COVID-19 Rapid Screening (Completed)    POCT Influenza A/B Molecular (Completed)    Sore throat        Relevant Orders    POCT Strep A, Molecular (Completed)    Streptococcus exposure        Relevant Medications    penicillin G procaine-penicillin G benzathine (BICILLIN-CR) injection 1.2 Million Units (Completed) (Start on 4/1/2024  8:45 AM)            Health Maintenance Topics with due status: Not Due       Topic Last Completion Date    Colorectal Cancer Screening 04/12/2022       Future Appointments   Date Time Provider Department Center   4/1/2024  8:30 AM Maggie Pruett FNP [facility] MARILEE Keen   5/13/2024  8:45 AM Mark Dean MD [facility]            Signature:  NEREIDA Gupta  [facility]     Date of encounter: 4/1/24

## 2024-05-13 ENCOUNTER — OFFICE VISIT (OUTPATIENT)
Dept: INTERNAL MEDICINE | Facility: CLINIC | Age: 49
End: 2024-05-13
Payer: COMMERCIAL

## 2024-05-13 VITALS
RESPIRATION RATE: 16 BRPM | TEMPERATURE: 98 F | DIASTOLIC BLOOD PRESSURE: 80 MMHG | HEART RATE: 55 BPM | WEIGHT: 156 LBS | HEIGHT: 67 IN | OXYGEN SATURATION: 99 % | BODY MASS INDEX: 24.48 KG/M2 | SYSTOLIC BLOOD PRESSURE: 122 MMHG

## 2024-05-13 DIAGNOSIS — J30.2 SEASONAL ALLERGIES: ICD-10-CM

## 2024-05-13 DIAGNOSIS — M15.9 OSTEOARTHRITIS OF MULTIPLE JOINTS, UNSPECIFIED OSTEOARTHRITIS TYPE: ICD-10-CM

## 2024-05-13 DIAGNOSIS — D50.0 IRON DEFICIENCY ANEMIA DUE TO CHRONIC BLOOD LOSS: ICD-10-CM

## 2024-05-13 DIAGNOSIS — I10 ESSENTIAL HYPERTENSION: Primary | ICD-10-CM

## 2024-05-13 DIAGNOSIS — Z86.79 HISTORY OF PALPITATIONS IN ADULTHOOD: ICD-10-CM

## 2024-05-13 PROCEDURE — 4010F ACE/ARB THERAPY RXD/TAKEN: CPT | Mod: S$GLB,,, | Performed by: INTERNAL MEDICINE

## 2024-05-13 PROCEDURE — 3008F BODY MASS INDEX DOCD: CPT | Mod: S$GLB,,, | Performed by: INTERNAL MEDICINE

## 2024-05-13 PROCEDURE — 99214 OFFICE O/P EST MOD 30 MIN: CPT | Mod: PBBFAC | Performed by: INTERNAL MEDICINE

## 2024-05-13 PROCEDURE — 1159F MED LIST DOCD IN RCRD: CPT | Mod: S$GLB,,, | Performed by: INTERNAL MEDICINE

## 2024-05-13 PROCEDURE — 3074F SYST BP LT 130 MM HG: CPT | Mod: S$GLB,,, | Performed by: INTERNAL MEDICINE

## 2024-05-13 PROCEDURE — 3079F DIAST BP 80-89 MM HG: CPT | Mod: S$GLB,,, | Performed by: INTERNAL MEDICINE

## 2024-05-13 PROCEDURE — 99214 OFFICE O/P EST MOD 30 MIN: CPT | Mod: S$GLB,,, | Performed by: INTERNAL MEDICINE

## 2024-05-13 PROCEDURE — 1160F RVW MEDS BY RX/DR IN RCRD: CPT | Mod: S$GLB,,, | Performed by: INTERNAL MEDICINE

## 2024-05-13 RX ORDER — LISINOPRIL 20 MG/1
20 TABLET ORAL 2 TIMES DAILY
Qty: 90 TABLET | Refills: 3 | Status: SHIPPED | OUTPATIENT
Start: 2024-05-13

## 2024-05-13 RX ORDER — LINACLOTIDE 145 UG/1
145 CAPSULE, GELATIN COATED ORAL
Qty: 90 CAPSULE | Refills: 3 | Status: SHIPPED | OUTPATIENT
Start: 2024-05-13

## 2024-05-13 NOTE — PROGRESS NOTES
Subjective:       Patient ID: Josefina Angelo is a 48 y.o. female.    Chief Complaint: Follow-up and Fatigue (Difficulty staying asleep)    No complaints except tiredness  and difficulty staying asleep    Follow-up  Associated symptoms include fatigue. Pertinent negatives include no abdominal pain, chest pain, rash or weakness.   Fatigue  Associated symptoms include fatigue. Pertinent negatives include no abdominal pain, chest pain, rash or weakness.   Review of Systems   Constitutional:  Positive for fatigue. Negative for unexpected weight change.   HENT:  Negative for ear pain and goiter.    Respiratory:  Negative for chest tightness and shortness of breath.    Cardiovascular:  Negative for chest pain, palpitations and leg swelling.   Gastrointestinal:  Negative for abdominal pain and reflux.   Integumentary:  Negative for rash and breast tenderness.   Neurological:  Negative for dizziness and weakness.   Hematological:  Negative for adenopathy.   Psychiatric/Behavioral:  Negative for behavioral problems.    Breast: Negative for tenderness      Objective:      Physical Exam  Constitutional:       Appearance: Normal appearance.   HENT:      Head: Normocephalic and atraumatic.      Right Ear: External ear normal.      Left Ear: External ear normal.      Mouth/Throat:      Pharynx: Oropharynx is clear.   Eyes:      Extraocular Movements: Extraocular movements intact.      Pupils: Pupils are equal, round, and reactive to light.   Cardiovascular:      Rate and Rhythm: Normal rate and regular rhythm.      Pulses: Normal pulses.      Heart sounds: Normal heart sounds.   Pulmonary:      Effort: Pulmonary effort is normal.      Breath sounds: Normal breath sounds.   Abdominal:      General: Abdomen is flat. Bowel sounds are normal.      Palpations: Abdomen is soft.   Musculoskeletal:         General: Normal range of motion.      Cervical back: Normal range of motion and neck supple.   Skin:     General: Skin is warm.       Capillary Refill: Capillary refill takes less than 2 seconds.   Neurological:      General: No focal deficit present.      Mental Status: She is alert.   Psychiatric:         Mood and Affect: Mood normal.         Thought Content: Thought content normal.         Judgment: Judgment normal.       Assessment:       1. Essential hypertension    2. Osteoarthritis of multiple joints, unspecified osteoarthritis type    3. History of palpitations in adulthood    4. Iron deficiency anemia due to chronic blood loss    5. Seasonal allergies        Plan:         Patient Instructions   Continue current tx and f/u 6 months      Problem List Items Addressed This Visit          Oncology    Iron deficiency anemia due to chronic blood loss     Other Visit Diagnoses       Essential hypertension    -  Primary    Osteoarthritis of multiple joints, unspecified osteoarthritis type        History of palpitations in adulthood        Seasonal allergies

## 2024-06-11 ENCOUNTER — OFFICE VISIT (OUTPATIENT)
Dept: FAMILY MEDICINE | Facility: CLINIC | Age: 49
End: 2024-06-11
Payer: COMMERCIAL

## 2024-06-11 VITALS
SYSTOLIC BLOOD PRESSURE: 144 MMHG | TEMPERATURE: 98 F | HEIGHT: 67 IN | OXYGEN SATURATION: 98 % | BODY MASS INDEX: 24.48 KG/M2 | DIASTOLIC BLOOD PRESSURE: 90 MMHG | HEART RATE: 69 BPM | WEIGHT: 156 LBS | RESPIRATION RATE: 20 BRPM

## 2024-06-11 DIAGNOSIS — B35.1 ONYCHOMYCOSIS OF GREAT TOE: Primary | ICD-10-CM

## 2024-06-11 PROBLEM — R10.31 RIGHT LOWER QUADRANT ABDOMINAL PAIN: Status: RESOLVED | Noted: 2022-05-11 | Resolved: 2024-06-11

## 2024-06-11 LAB
ALBUMIN SERPL BCP-MCNC: 3.8 G/DL (ref 3.5–5)
ALBUMIN/GLOB SERPL: 1.2 {RATIO}
ALP SERPL-CCNC: 64 U/L (ref 39–100)
ALT SERPL W P-5'-P-CCNC: 37 U/L (ref 13–56)
ANION GAP SERPL CALCULATED.3IONS-SCNC: 10 MMOL/L (ref 7–16)
AST SERPL W P-5'-P-CCNC: 26 U/L (ref 15–37)
BILIRUB SERPL-MCNC: 0.5 MG/DL (ref ?–1.2)
BUN SERPL-MCNC: 11 MG/DL (ref 7–18)
BUN/CREAT SERPL: 14 (ref 6–20)
CALCIUM SERPL-MCNC: 9.1 MG/DL (ref 8.5–10.1)
CHLORIDE SERPL-SCNC: 107 MMOL/L (ref 98–107)
CO2 SERPL-SCNC: 30 MMOL/L (ref 21–32)
CREAT SERPL-MCNC: 0.77 MG/DL (ref 0.55–1.02)
EGFR (NO RACE VARIABLE) (RUSH/TITUS): 95 ML/MIN/1.73M2
GLOBULIN SER-MCNC: 3.3 G/DL (ref 2–4)
GLUCOSE SERPL-MCNC: 83 MG/DL (ref 74–106)
POTASSIUM SERPL-SCNC: 4.6 MMOL/L (ref 3.5–5.1)
PROT SERPL-MCNC: 7.1 G/DL (ref 6.4–8.2)
SODIUM SERPL-SCNC: 142 MMOL/L (ref 136–145)

## 2024-06-11 PROCEDURE — 4010F ACE/ARB THERAPY RXD/TAKEN: CPT | Mod: ,,, | Performed by: NURSE PRACTITIONER

## 2024-06-11 PROCEDURE — 99213 OFFICE O/P EST LOW 20 MIN: CPT | Mod: ,,, | Performed by: NURSE PRACTITIONER

## 2024-06-11 PROCEDURE — 3077F SYST BP >= 140 MM HG: CPT | Mod: ,,, | Performed by: NURSE PRACTITIONER

## 2024-06-11 PROCEDURE — 1159F MED LIST DOCD IN RCRD: CPT | Mod: ,,, | Performed by: NURSE PRACTITIONER

## 2024-06-11 PROCEDURE — 80053 COMPREHEN METABOLIC PANEL: CPT | Mod: ,,, | Performed by: CLINICAL MEDICAL LABORATORY

## 2024-06-11 PROCEDURE — 3080F DIAST BP >= 90 MM HG: CPT | Mod: ,,, | Performed by: NURSE PRACTITIONER

## 2024-06-11 PROCEDURE — 3008F BODY MASS INDEX DOCD: CPT | Mod: ,,, | Performed by: NURSE PRACTITIONER

## 2024-06-11 PROCEDURE — 1160F RVW MEDS BY RX/DR IN RCRD: CPT | Mod: ,,, | Performed by: NURSE PRACTITIONER

## 2024-06-11 RX ORDER — TERBINAFINE HYDROCHLORIDE 250 MG/1
250 TABLET ORAL DAILY
Qty: 42 TABLET | Refills: 0 | Status: SHIPPED | OUTPATIENT
Start: 2024-06-11 | End: 2024-07-23

## 2024-06-11 RX ORDER — PROMETHAZINE HYDROCHLORIDE 25 MG/1
25 TABLET ORAL
COMMUNITY
Start: 2024-04-04

## 2024-06-11 NOTE — PROGRESS NOTES
NEREIDA Gupta        PATIENT NAME: Josefina Angelo  : 1975  DATE: 24  MRN: [id number]      Patient PCP Information       Provider PCP Type    Primary Doctor No General            Reason for Visit / Chief Complaint: Toe Pain (Patient presents to the clinic for toe nail pain (fungus) on big right toe went and got her feet done about 2 months ago)       Update PCP  Update Chief Complaint         History of Present Illness / Problem Focused Workflow     Josefina Angelo presents to the clinic with Toe Pain (Patient presents to the clinic for toe nail pain (fungus) on big right toe went and got her feet done about 2 months ago)     Toe Pain       Patient presents to clinic with left great toenail infection. Patient states toenail turned black and fell off this week. Patient was seen in  of this year following pedicure and treated for fungal infection, patient states she took lamisil. Patient states nail started to grow out however following completion of medicine nail has changed color and now fallen off.   Nail bed wo drainage or swelling. Fungal nail change noted to base.   Patient denies injury.       Medical / Social / Family History     Past Medical History:   Diagnosis Date    H/O atrioventricular jocy ablation     Hypertension     Paroxysmal A-fib     Small bowel obstruction 2022       Past Surgical History:   Procedure Laterality Date    AUGMENTATION OF BREAST Bilateral     2x    CARDIAC ELECTROPHYSIOLOGY MAPPING AND ABLATION  2012     SECTION      DIAGNOSTIC LAPAROSCOPY N/A 2022    Procedure: LAPAROSCOPY, DIAGNOSTIC;  Surgeon: Alexis Calabrese MD;  Location: [facility] OR;  Service: General;  Laterality: N/A;  LYSIS OF ADHESIONS    LEFT ARM SURGERY      LEFT LEG SURGERY      JOSE AND SCREWS    ROBOT-ASSISTED LAPAROSCOPIC HYSTERECTOMY N/A 2021    Procedure: ROBOTIC HYSTERECTOMY,POSS BSO,CYSTO ;  Surgeon: Young Jackson MD;  Location: [facility] OR;  Service:  "OB/GYN;  Laterality: N/A;    ROBOT-ASSISTED LAPAROSCOPIC SALPINGO-OOPHORECTOMY Left 12/08/2021    Procedure: ROBOTIC SALPINGO-OOPHORECTOMY;  Surgeon: Young Jackson MD;  Location: [facility];  Service: OB/GYN;  Laterality: Left;    TUBAL LIGATION      tummy tuck      VAGINAL DELIVERY      X 2       Social History  Ms.  reports that she has never smoked. She has never been exposed to tobacco smoke. She has never used smokeless tobacco. She reports that she does not currently use alcohol. She reports that she does not use drugs.    Family History  Ms.'s family history includes Colon cancer in her mother; Heart disease in her father and paternal grandmother; Hypertension in her father; Lung cancer in her mother; Lung disease in her mother.    Medications and Allergies     Medications  Outpatient Medications Marked as Taking for the 6/11/24 encounter (Office Visit) with Maggie Pruett FNP   Medication Sig Dispense Refill    albuterol (VENTOLIN HFA) 90 mcg/actuation inhaler Inhale 2 puffs into the lungs every 6 (six) hours as needed for Wheezing or Shortness of Breath. Rescue 8.5 g 0    LINZESS 145 mcg Cap capsule Take 1 capsule (145 mcg total) by mouth before breakfast. 90 capsule 3    lisinopriL (PRINIVIL,ZESTRIL) 20 MG tablet Take 1 tablet (20 mg total) by mouth 2 (two) times daily. 90 tablet 3    promethazine (PHENERGAN) 25 MG tablet Take 25 mg by mouth.         Allergies  Review of patient's allergies indicates:  No Known Allergies    Physical Examination     Vitals:    06/11/24 0728 06/11/24 0735   BP: (!) 147/91 (!) 144/90   BP Location: Right arm Right arm   Patient Position: Sitting Sitting   BP Method: Small (Automatic) Medium (Automatic)   Pulse: 69    Resp: 20    Temp: 97.8 °F (36.6 °C)    TempSrc: Oral    SpO2: 98%    Weight: 70.8 kg (156 lb)    Height: 5' 7" (1.702 m)        Physical Exam  Vitals reviewed.   Constitutional:       Appearance: Normal appearance.   HENT:      Head: Normocephalic.      " Right Ear: External ear normal.      Left Ear: External ear normal.      Nose: Nose normal.      Mouth/Throat:      Mouth: Mucous membranes are moist.   Eyes:      Extraocular Movements: Extraocular movements intact.   Cardiovascular:      Rate and Rhythm: Normal rate.   Musculoskeletal:         General: Normal range of motion.      Cervical back: Normal range of motion.   Feet:      Left foot:      Toenail Condition: Fungal disease present.     Comments: Toenail off great toe, nail bed dry   Skin:     Capillary Refill: Capillary refill takes less than 2 seconds.   Neurological:      General: No focal deficit present.      Mental Status: She is alert and oriented to person, place, and time.           No visits with results within 14 Day(s) from this visit.   Latest known visit with results is:   Office Visit on 04/01/2024   Component Date Value Ref Range Status    POC Rapid COVID 04/01/2024 Negative  Negative Final     Acceptable 04/01/2024 Yes   Final    POC Molecular Influenza A Ag 04/01/2024 Negative  Negative, Not Reported Final    POC Molecular Influenza B Ag 04/01/2024 Negative  Negative, Not Reported Final     Acceptable 04/01/2024 Yes   Final    Molecular Strep A, POC 04/01/2024 Negative  Negative Final     Acceptable 04/01/2024 Yes   Final             Assessment and Plan (including Health Maintenance)      Problem List  Smart Sets  Document Outside HM   :    Plan:     1. Onychomycosis of great toe  -     terbinafine HCL (LAMISIL) 250 mg tablet; Take 1 tablet (250 mg total) by mouth once daily.  Dispense: 42 tablet; Refill: 0  -     Comprehensive Metabolic Panel; Future; Expected date: 06/11/2024    RTC in 6 weeks     There are no Patient Instructions on file for this visit.       Health Maintenance Due   Topic Date Due    Hepatitis C Screening  Never done    Lipid Panel  Never done    HIV Screening  Never done    TETANUS VACCINE  Never done    Mammogram  Never  done    COVID-19 Vaccine (3 - 2023-24 season) 09/01/2023       Most Recent Immunizations   Administered Date(s) Administered    COVID-19, MRNA, LN-S, PF (MODERNA FULL 0.5 ML DOSE) 08/30/2021            Health Maintenance Topics with due status: Not Due       Topic Last Completion Date    Colorectal Cancer Screening 04/12/2022    Influenza Vaccine Not Due       Future Appointments   Date Time Provider Department Center   6/11/2024  8:00 AM Maggie Pruett FNP [facility] Amauri Leonila   11/13/2024  8:30 AM Mark Dean MD [facility]            Signature:  NEREIDA Gupta  [facility]     Date of encounter: 6/11/24

## 2024-07-31 ENCOUNTER — PATIENT OUTREACH (OUTPATIENT)
Facility: HOSPITAL | Age: 49
End: 2024-07-31
Payer: COMMERCIAL

## 2024-07-31 NOTE — PROGRESS NOTES
Population Health Chart Review & Patient Outreach Details      Further Action Needed If Patient Returns Outreach:            Updates Requested / Reviewed:     []  Care Everywhere    []     []  External Sources ([facility], [facility], [facility], etc.)    [] [facility]   [] [facility]   [] Other:    []  Care Team Updated   []  Removed  or Duplicate Orders   []  Immunization Reconciliation Completed / Queried    [] [place]   [] [place]   [] [place]   [] [place]      Health Maintenance Topics Addressed and Outreach Outcomes / Actions Taken:             Breast Cancer Screening []  Mammogram Order Placed    []  Mammogram Screening Scheduled    []  External Records Requested & Care Team Updated if Applicable    []  External Records Uploaded & Care Team Updated if Applicable    []  Pt Declined Scheduling Mammogram    []  Pt Will Schedule with External Provider / Order Routed & Care Team Updated if Applicable              Cervical Cancer Screening []  Pap Smear Scheduled in Primary Care or OBGYN    []  External Records Requested & Care Team Updated if Applicable       []  External Records Uploaded, Care Team Updated, & History Updated if Applicable    []  Patient Declined Scheduling Pap Smear    []  Patient Will Schedule with External Provider & Care Team Updated if Applicable                  Colorectal Cancer Screening []  Colonoscopy Case Request / Referral / Home Test Order Placed    []  External Records Requested & Care Team Updated if Applicable    []  External Records Uploaded, Care Team Updated, & History Updated if Applicable    []  Patient Declined Completing Colon Cancer Screening    []  Patient Will Schedule with External Provider & Care Team Updated if Applicable    []  Fit Kit Mailed (add the SmartPhrase under additional notes)    []  Reminded Patient to Complete Home Test                Diabetic Eye Exam []  Eye Exam Screening Order Placed    []  Eye Camera Scheduled or Optometry/Ophthalmology Referral  Placed    []  External Records Requested & Care Team Updated if Applicable    []  External Records Uploaded, Care Team Updated, & History Updated if Applicable    []  Patient Declined Scheduling Eye Exam    []  Patient Will Schedule with External Provider & Care Team Updated if Applicable             Blood Pressure Control []  Primary Care Follow Up Visit Scheduled     []  Remote Blood Pressure Reading Captured    []  Patient Declined Remote Reading or Scheduling Appt - Escalated to PCP    []  Patient Will Call Back or Send Portal Message with Reading                 HbA1c & Other Labs []  Overdue Lab(s) Ordered    []  Overdue Lab(s) Scheduled    []  External Records Uploaded & Care Team Updated if Applicable    []  Primary Care Follow Up Visit Scheduled     []  Reminded Patient to Complete A1c Home Test    []  Patient Declined Scheduling Labs or Will Call Back to Schedule    []  Patient Will Schedule with External Provider / Order Routed, & Care Team Updated if Applicable           Primary Care Appointment []  Primary Care Appt Scheduled    []  Patient Declined Scheduling or Will Call Back to Schedule    []  Pt Established with External Provider, Updated Care Team, & Informed Pt to Notify Payor if Applicable           Medication Adherence /    Statin Use []  Primary Care Appointment Scheduled    []  Patient Reminded to  Prescription    []  Patient Declined, Provider Notified if Needed    []  Sent Provider Message to Review to Evaluate Pt for Statin, Add Exclusion Dx Codes, Document   Exclusion in Problem List, Change Statin Intensity Level to Moderate or High Intensity if Applicable                Osteoporosis Screening []  Dexa Order Placed    []  Dexa Appointment Scheduled    []  External Records Requested & Care Team Updated    []  External Records Uploaded, Care Team Updated, & History Updated if Applicable    []  Patient Declined Scheduling Dexa or Will Call Back to Schedule    []  Patient Will Schedule  with External Provider / Order Routed & Care Team Updated if Applicable       Additional Notes:.  Pt needs appt for missed HY 7/30/24 sent to PES to schedule via staff message.

## 2024-08-29 ENCOUNTER — OFFICE VISIT (OUTPATIENT)
Dept: FAMILY MEDICINE | Facility: CLINIC | Age: 49
End: 2024-08-29
Payer: COMMERCIAL

## 2024-08-29 VITALS
SYSTOLIC BLOOD PRESSURE: 147 MMHG | BODY MASS INDEX: 24.75 KG/M2 | OXYGEN SATURATION: 98 % | DIASTOLIC BLOOD PRESSURE: 93 MMHG | RESPIRATION RATE: 20 BRPM | TEMPERATURE: 98 F | HEART RATE: 67 BPM | WEIGHT: 154 LBS | HEIGHT: 66 IN

## 2024-08-29 DIAGNOSIS — Z20.828 CONTACT WITH OR EXPOSURE TO VIRAL DISEASE: ICD-10-CM

## 2024-08-29 DIAGNOSIS — H92.01 ACUTE OTALGIA, RIGHT: ICD-10-CM

## 2024-08-29 DIAGNOSIS — J01.90 ACUTE NON-RECURRENT SINUSITIS, UNSPECIFIED LOCATION: Primary | ICD-10-CM

## 2024-08-29 LAB
CTP QC/QA: YES
SARS-COV-2 RDRP RESP QL NAA+PROBE: NEGATIVE

## 2024-08-29 RX ORDER — ESTRADIOL 10 UG/1
INSERT VAGINAL
COMMUNITY
Start: 2024-07-17

## 2024-08-29 RX ORDER — PHENTERMINE HYDROCHLORIDE 37.5 MG/1
37.5 TABLET ORAL EVERY MORNING
COMMUNITY
Start: 2024-08-16

## 2024-08-29 RX ORDER — NALTREXONE HYDROCHLORIDE 50 MG/1
25 TABLET, FILM COATED ORAL NIGHTLY
COMMUNITY
Start: 2024-08-16

## 2024-08-29 RX ORDER — DOXYLAMINE SUCCINATE AND PHENYLEPHRINE HYDROCHLORIDE 10.5; 1 MG/1; MG/1
1 TABLET ORAL 4 TIMES DAILY PRN
Qty: 20 TABLET | Refills: 0 | Status: SHIPPED | OUTPATIENT
Start: 2024-08-29

## 2024-08-29 RX ORDER — DEXAMETHASONE SODIUM PHOSPHATE 4 MG/ML
8 INJECTION, SOLUTION INTRA-ARTICULAR; INTRALESIONAL; INTRAMUSCULAR; INTRAVENOUS; SOFT TISSUE ONCE
Status: DISCONTINUED | OUTPATIENT
Start: 2024-08-29 | End: 2024-08-29

## 2024-08-29 RX ORDER — CEFDINIR 300 MG/1
300 CAPSULE ORAL 2 TIMES DAILY
Qty: 20 CAPSULE | Refills: 0 | Status: SHIPPED | OUTPATIENT
Start: 2024-08-29 | End: 2024-09-08

## 2024-08-29 RX ORDER — SULFAMETHOXAZOLE AND TRIMETHOPRIM 800; 160 MG/1; MG/1
60 TABLET ORAL
COMMUNITY
Start: 2024-07-17

## 2024-08-29 NOTE — PROGRESS NOTES
Subjective:       Patient ID: Josefina Angelo is a 48 y.o. female.    Chief Complaint: COVID-19 Concerns (Right ear discomfort, chills, headache, sinus congestion, sore throat)    COVID-19 Concerns (Right ear discomfort, chills, headache, sinus congestion, sore throat)      Sore Throat   There has been no fever. The fever has been present for 1 to 2 days. The pain is moderate. Associated symptoms include congestion, ear pain, headaches and a hoarse voice. Pertinent negatives include no abdominal pain, coughing, diarrhea, drooling, ear discharge, plugged ear sensation, neck pain, shortness of breath, stridor, swollen glands or vomiting. She has tried NSAIDs and cool liquids for the symptoms. The treatment provided no relief.     Review of Systems   Constitutional:  Negative for appetite change, fatigue and fever.   HENT:  Positive for nasal congestion, ear pain, hoarse voice and sore throat. Negative for drooling and ear discharge.    Eyes:  Negative for pain, discharge and itching.   Respiratory:  Negative for cough, shortness of breath and stridor.    Cardiovascular:  Negative for chest pain and leg swelling.   Gastrointestinal:  Negative for abdominal pain, change in bowel habit, diarrhea, nausea and vomiting.   Musculoskeletal:  Negative for back pain, gait problem and neck pain.   Integumentary:  Negative for rash and wound.   Allergic/Immunologic: Negative for immunocompromised state.   Neurological:  Positive for headaches. Negative for dizziness and weakness.   All other systems reviewed and are negative.        Objective:      Physical Exam  Vitals and nursing note reviewed.   Constitutional:       General: She is not in acute distress.     Appearance: Normal appearance. She is not ill-appearing, toxic-appearing or diaphoretic.   HENT:      Head: Normocephalic.      Right Ear: Tympanic membrane, ear canal and external ear normal.      Left Ear: Tympanic membrane, ear canal and external ear normal.      Nose:  Mucosal edema, congestion and rhinorrhea present.      Right Turbinates: Swollen.      Left Turbinates: Swollen.      Right Sinus: Maxillary sinus tenderness present.      Left Sinus: Maxillary sinus tenderness present.      Mouth/Throat:      Mouth: Mucous membranes are moist.      Pharynx: Posterior oropharyngeal erythema present. No oropharyngeal exudate.   Eyes:      General: No scleral icterus.        Right eye: No discharge.         Left eye: No discharge.      Extraocular Movements: Extraocular movements intact.      Conjunctiva/sclera: Conjunctivae normal.      Pupils: Pupils are equal, round, and reactive to light.   Cardiovascular:      Rate and Rhythm: Normal rate and regular rhythm.      Pulses: Normal pulses.      Heart sounds: Normal heart sounds. No murmur heard.  Pulmonary:      Effort: Pulmonary effort is normal. No respiratory distress.      Breath sounds: Normal breath sounds. No wheezing, rhonchi or rales.   Musculoskeletal:         General: Normal range of motion.      Cervical back: Neck supple. No tenderness.   Lymphadenopathy:      Cervical: No cervical adenopathy.   Skin:     General: Skin is warm and dry.      Capillary Refill: Capillary refill takes less than 2 seconds.      Findings: No rash.   Neurological:      Mental Status: She is alert and oriented to person, place, and time.   Psychiatric:         Mood and Affect: Mood normal.         Behavior: Behavior normal.         Thought Content: Thought content normal.         Judgment: Judgment normal.            Assessment:       1. Acute non-recurrent sinusitis, unspecified location    2. Contact with or exposure to viral disease    3. Acute otalgia, right        Plan:   Acute non-recurrent sinusitis, unspecified location  -     Discontinue: dexAMETHasone injection 8 mg  -     cefdinir (OMNICEF) 300 MG capsule; Take 1 capsule (300 mg total) by mouth 2 (two) times daily. for 10 days  Dispense: 20 capsule; Refill: 0  -      doxylamine-phenylephrine (POLY HIST FORTE) 10.5-10 mg Tab; Take 1 tablet by mouth 4 (four) times daily as needed (congestion).  Dispense: 20 tablet; Refill: 0    Contact with or exposure to viral disease  -     POCT COVID-19 Rapid Screening    Acute otalgia, right           Risks, benefits, and side effects were discussed with the patient. All questions were answered to the fullest satisfaction of the patient, and pt verbalized understanding and agreement to treatment plan. Pt was to call with any new or worsening symptoms, or present to the ER

## 2024-10-08 ENCOUNTER — OFFICE VISIT (OUTPATIENT)
Dept: FAMILY MEDICINE | Facility: CLINIC | Age: 49
End: 2024-10-08
Payer: COMMERCIAL

## 2024-10-08 VITALS
DIASTOLIC BLOOD PRESSURE: 81 MMHG | WEIGHT: 158 LBS | RESPIRATION RATE: 20 BRPM | TEMPERATURE: 98 F | OXYGEN SATURATION: 99 % | HEIGHT: 66 IN | BODY MASS INDEX: 25.39 KG/M2 | SYSTOLIC BLOOD PRESSURE: 128 MMHG | HEART RATE: 60 BPM

## 2024-10-08 DIAGNOSIS — K58.2 IRRITABLE BOWEL SYNDROME WITH BOTH CONSTIPATION AND DIARRHEA: Primary | ICD-10-CM

## 2024-10-08 DIAGNOSIS — R10.9 ABDOMINAL PAIN, UNSPECIFIED ABDOMINAL LOCATION: ICD-10-CM

## 2024-10-08 PROBLEM — H92.01 ACUTE OTALGIA, RIGHT: Status: RESOLVED | Noted: 2024-08-29 | Resolved: 2024-10-08

## 2024-10-08 PROBLEM — R63.5 WEIGHT GAIN: Chronic | Status: RESOLVED | Noted: 2024-01-26 | Resolved: 2024-10-08

## 2024-10-08 PROBLEM — E07.9 THYROID DISORDER: Status: RESOLVED | Noted: 2024-08-16 | Resolved: 2024-10-08

## 2024-10-08 PROBLEM — Z20.828 CONTACT WITH OR EXPOSURE TO VIRAL DISEASE: Status: RESOLVED | Noted: 2024-08-29 | Resolved: 2024-10-08

## 2024-10-08 PROBLEM — J01.90 ACUTE NON-RECURRENT SINUSITIS: Status: RESOLVED | Noted: 2024-08-29 | Resolved: 2024-10-08

## 2024-10-08 PROBLEM — E07.9 THYROID DISORDER: Status: ACTIVE | Noted: 2024-08-16

## 2024-10-08 PROCEDURE — 4010F ACE/ARB THERAPY RXD/TAKEN: CPT | Mod: ,,, | Performed by: NURSE PRACTITIONER

## 2024-10-08 PROCEDURE — 3079F DIAST BP 80-89 MM HG: CPT | Mod: ,,, | Performed by: NURSE PRACTITIONER

## 2024-10-08 PROCEDURE — 3074F SYST BP LT 130 MM HG: CPT | Mod: ,,, | Performed by: NURSE PRACTITIONER

## 2024-10-08 PROCEDURE — 1159F MED LIST DOCD IN RCRD: CPT | Mod: ,,, | Performed by: NURSE PRACTITIONER

## 2024-10-08 PROCEDURE — 99212 OFFICE O/P EST SF 10 MIN: CPT | Mod: ,,, | Performed by: NURSE PRACTITIONER

## 2024-10-08 PROCEDURE — 3008F BODY MASS INDEX DOCD: CPT | Mod: ,,, | Performed by: NURSE PRACTITIONER

## 2024-10-08 PROCEDURE — 1160F RVW MEDS BY RX/DR IN RCRD: CPT | Mod: ,,, | Performed by: NURSE PRACTITIONER

## 2024-10-08 RX ORDER — LACTULOSE 10 G/15ML
10 SOLUTION ORAL DAILY
COMMUNITY
Start: 2024-09-12 | End: 2024-10-08

## 2024-10-08 NOTE — PROGRESS NOTES
NEREIDA Gupta        PATIENT NAME: Josefina Angelo  : 1975  DATE: 10/8/24  MRN: [id number]      Patient PCP Information       Provider PCP Type    Primary Doctor No General            Reason for Visit / Chief Complaint: Abdominal Pain (Pt presents to the clinic for stomach issues)       Update PCP  Update Chief Complaint         History of Present Illness / Problem Focused Workflow     Josefina Angelo presents to the clinic with Abdominal Pain (Pt presents to the clinic for stomach issues)     HPI  Ms Angelo presents to clinic with request for referral to GI. Patient has experienced issues with GI symptoms in past and recently seem to be worsening. Patient reports upper abdominal discomfort, bloating, diarrhea and constipation at times. Prior Cscope per Dr Zhao in . Patient states she has taken linzess in past for constipation however symptoms remain irregular.       Medical / Social / Family History     Past Medical History:   Diagnosis Date    H/O atrioventricular jocy ablation     Hypertension     Paroxysmal A-fib     Small bowel obstruction 2022       Past Surgical History:   Procedure Laterality Date    AUGMENTATION OF BREAST Bilateral     2x    CARDIAC ELECTROPHYSIOLOGY MAPPING AND ABLATION       SECTION      DIAGNOSTIC LAPAROSCOPY N/A 2022    Procedure: LAPAROSCOPY, DIAGNOSTIC;  Surgeon: Alexis Calabrese MD;  [facility];  Service: General;  Laterality: N/A;  LYSIS OF ADHESIONS    LEFT ARM SURGERY      LEFT LEG SURGERY      JOSE AND SCREWS    ROBOT-ASSISTED LAPAROSCOPIC HYSTERECTOMY N/A 2021    Procedure: ROBOTIC HYSTERECTOMY,POSS BSO,CYSTO ;  Surgeon: Young Jackson MD;  [facility];  Service: OB/GYN;  Laterality: N/A;    ROBOT-ASSISTED LAPAROSCOPIC SALPINGO-OOPHORECTOMY Left 2021    Procedure: ROBOTIC SALPINGO-OOPHORECTOMY;  Surgeon: Young Jackson MD;  [facility];  Service: OB/GYN;  Laterality: Left;    TUBAL  "LIGATION      tummy tuck      VAGINAL DELIVERY      X 2       Social History  Ms.  reports that she has never smoked. She has never been exposed to tobacco smoke. She has never used smokeless tobacco. She reports that she does not currently use alcohol. She reports that she does not use drugs.    Family History  Ms.'s family history includes Colon cancer in her mother; Heart disease in her father and paternal grandmother; Hypertension in her father; Lung cancer in her mother; Lung disease in her mother.    Medications and Allergies     Medications  Outpatient Medications Marked as Taking for the 10/8/24 encounter (Office Visit) with Maggie Pruett FNP   Medication Sig Dispense Refill    LINZESS 145 mcg Cap capsule Take 1 capsule (145 mcg total) by mouth before breakfast. 90 capsule 3    lisinopriL (PRINIVIL,ZESTRIL) 20 MG tablet Take 1 tablet (20 mg total) by mouth 2 (two) times daily. 90 tablet 3       Allergies  Review of patient's allergies indicates:  No Known Allergies    Physical Examination     Vitals:    10/08/24 1001   BP: 128/81   BP Location: Right arm   Patient Position: Sitting   Pulse: 60   Resp: 20   Temp: 97.6 °F (36.4 °C)   TempSrc: Oral   SpO2: 99%   Weight: 71.7 kg (158 lb)   Height: 5' 6" (1.676 m)       Physical Exam  Vitals reviewed.   HENT:      Head: Normocephalic.      Right Ear: External ear normal.      Left Ear: External ear normal.      Nose: Nose normal.      Mouth/Throat:      Mouth: Mucous membranes are moist.   Eyes:      Extraocular Movements: Extraocular movements intact.   Cardiovascular:      Rate and Rhythm: Normal rate.      Pulses: Normal pulses.   Pulmonary:      Effort: Pulmonary effort is normal.      Breath sounds: Normal breath sounds.   Abdominal:      Tenderness: There is abdominal tenderness.      Comments: Upper quad tenderness generalized, soft   Musculoskeletal:         General: Normal range of motion.      Cervical back: Normal range of motion.   Skin:     " General: Skin is warm and dry.      Capillary Refill: Capillary refill takes less than 2 seconds.   Neurological:      General: No focal deficit present.      Mental Status: She is alert and oriented to person, place, and time.   Psychiatric:         Mood and Affect: Mood normal.         Behavior: Behavior normal.         Thought Content: Thought content normal.         Judgment: Judgment normal.           No visits with results within 14 Day(s) from this visit.   Latest known visit with results is:   Office Visit on 08/29/2024   Component Date Value Ref Range Status    POC Rapid COVID 08/29/2024 Negative  Negative Final     Acceptable 08/29/2024 Yes   Final             Assessment and Plan (including Health Maintenance)      Problem List  Smart Sets  Document Outside HM   :    Plan:     1. Irritable bowel syndrome with both constipation and diarrhea  -     Ambulatory referral/consult to Gastroenterology; Future; Expected date: 10/15/2024    2. Abdominal pain, unspecified abdominal location  -     US Abdomen Limited_Gallbladder; Future; Expected date: 10/08/2024      RTC prn  There are no Patient Instructions on file for this visit.       Health Maintenance Due   Topic Date Due    Hepatitis C Screening  Never done    Lipid Panel  Never done    HIV Screening  Never done    TETANUS VACCINE  Never done    Mammogram  Never done    Hemoglobin A1c (Diabetic Prevention Screening)  Never done    Influenza Vaccine (1) Never done    COVID-19 Vaccine (3 - 2024-25 season) 09/01/2024       Most Recent Immunizations   Administered Date(s) Administered    COVID-19, MRNA, LN-S, PF (MODERNA FULL 0.5 ML DOSE) 08/30/2021            Health Maintenance Topics with due status: Not Due       Topic Last Completion Date    Colorectal Cancer Screening 04/12/2022    RSV Vaccine (Age 60+ and Pregnant patients) Not Due       Future Appointments   Date Time Provider Department Center   10/21/2024 10:00 AM [facility] US1 [facility] USIC  [place] JULIA Asya   11/13/2024  8:30 AM Mark Dean MD [facility]            Signature:  NEREIDA Gupta  [facility]     Date of encounter: 10/8/24

## 2024-10-10 ENCOUNTER — OFFICE VISIT (OUTPATIENT)
Dept: GASTROENTEROLOGY | Facility: CLINIC | Age: 49
End: 2024-10-10
Payer: COMMERCIAL

## 2024-10-10 VITALS
HEIGHT: 66 IN | SYSTOLIC BLOOD PRESSURE: 155 MMHG | HEART RATE: 77 BPM | OXYGEN SATURATION: 99 % | WEIGHT: 158.81 LBS | DIASTOLIC BLOOD PRESSURE: 96 MMHG | BODY MASS INDEX: 25.52 KG/M2

## 2024-10-10 DIAGNOSIS — K58.2 IRRITABLE BOWEL SYNDROME WITH BOTH CONSTIPATION AND DIARRHEA: ICD-10-CM

## 2024-10-10 DIAGNOSIS — R10.11 RUQ PAIN: Primary | ICD-10-CM

## 2024-10-10 DIAGNOSIS — R11.2 NAUSEA AND VOMITING, UNSPECIFIED VOMITING TYPE: ICD-10-CM

## 2024-10-10 PROCEDURE — 4010F ACE/ARB THERAPY RXD/TAKEN: CPT | Mod: S$GLB,,,

## 2024-10-10 PROCEDURE — 3080F DIAST BP >= 90 MM HG: CPT | Mod: S$GLB,,,

## 2024-10-10 PROCEDURE — 1159F MED LIST DOCD IN RCRD: CPT | Mod: S$GLB,,,

## 2024-10-10 PROCEDURE — 99214 OFFICE O/P EST MOD 30 MIN: CPT | Mod: S$GLB,,,

## 2024-10-10 PROCEDURE — 3077F SYST BP >= 140 MM HG: CPT | Mod: S$GLB,,,

## 2024-10-10 PROCEDURE — 3008F BODY MASS INDEX DOCD: CPT | Mod: S$GLB,,,

## 2024-10-10 PROCEDURE — 99999 PR PBB SHADOW E&M-EST. PATIENT-LVL IV: CPT | Mod: PBBFAC,,,

## 2024-10-10 RX ORDER — OMEPRAZOLE 40 MG/1
40 CAPSULE, DELAYED RELEASE ORAL DAILY
Qty: 30 CAPSULE | Refills: 11 | Status: SHIPPED | OUTPATIENT
Start: 2024-10-10 | End: 2025-10-10

## 2024-10-10 NOTE — PROGRESS NOTES
Gastroenterology Clinic Note    Patient ID: [id number]   Referring MD: Maggie Pruett FNP   Chief Complaint:   Chief Complaint   Patient presents with    Abdominal Pain     Right upper and lower and lower center    Constipation    Diarrhea    Nausea       History of Present Illness   Josefina Angelo is an 48 y.o. WF who is referred for abdominal pain.  Patient reports severe right upper quadrant abdominal pain.  Pain is postprandial.  She also experiences nausea and vomiting when she tries to eat.  She was seen at [facility] emergency department 9/12/24 with chest pain, constipation, and hypertension.  CT of the abdomen at that time did not reveal any acute findings.  She did have mild fecal stasis/constipation changes.  She has a history of gastric ulcers.  Her last EGD was many years ago.  She is not on PPI therapy.  She also reports issues with constipation.  Surgical history includes small-bowel obstruction requiring surgical intervention following a hysterectomy.  She has since experienced both bladder and rectal prolapse.  She require splinting to have a bowel movement.  She currently takes Linzess 145 mcg every morning but may still go days between bowel movements with this.  She has gone through pelvic floor PT in the past without any improvement.  She is scheduled for office visit with Uro-Gyn at [facility] 12/09/24.      Previous workup:  CT abdomen pelvis with IV contrast    Last colonoscopy was 06/19/2023 with moderate amount of semi-liquid stool in the entire colon precluding visualization.  There was no endoscopic evidence of bleeding, diverticula, inflammation or mass in the colon.  Internal hemorrhoids were found.    Review of Systems   Constitutional:  Negative for weight loss.   Gastrointestinal:  Positive for abdominal pain, constipation, diarrhea, nausea and vomiting. Negative for blood in stool, heartburn and melena.       Past Medical History      Past Medical History:   Diagnosis Date    H/O  atrioventricular jocy ablation     Hypertension     Paroxysmal A-fib     Small bowel obstruction 2022       Past Surgical History     Past Surgical History:   Procedure Laterality Date    AUGMENTATION OF BREAST Bilateral     2x    CARDIAC ELECTROPHYSIOLOGY MAPPING AND ABLATION  2012     SECTION      DIAGNOSTIC LAPAROSCOPY N/A 2022    Procedure: LAPAROSCOPY, DIAGNOSTIC;  Surgeon: Alexis Calabrese MD;  Location: [facility];  Service: General;  Laterality: N/A;  LYSIS OF ADHESIONS    LEFT ARM SURGERY      LEFT LEG SURGERY      JOSE AND SCREWS    ROBOT-ASSISTED LAPAROSCOPIC HYSTERECTOMY N/A 2021    Procedure: ROBOTIC HYSTERECTOMY,POSS BSO,CYSTO ;  Surgeon: Young Jackson MD;  Location: [facility];  Service: OB/GYN;  Laterality: N/A;    ROBOT-ASSISTED LAPAROSCOPIC SALPINGO-OOPHORECTOMY Left 2021    Procedure: ROBOTIC SALPINGO-OOPHORECTOMY;  Surgeon: Young Jackson MD;  Location: [facility];  Service: OB/GYN;  Laterality: Left;    TUBAL LIGATION      tummy tuck      VAGINAL DELIVERY      X 2       Allergies   Review of patient's allergies indicates:  No Known Allergies    Immunization History     Immunization History   Administered Date(s) Administered    COVID-19, MRNA, LN-S, PF (MODERNA FULL 0.5 ML DOSE) 2021, 2021       Past Family History      Family History   Problem Relation Name Age of Onset    Colon cancer Mother      Lung cancer Mother      Lung disease Mother      Hypertension Father      Heart disease Father      Heart disease Paternal Grandmother         Past Social History      Social History     Socioeconomic History    Marital status: Other   Tobacco Use    Smoking status: Never     Passive exposure: Never    Smokeless tobacco: Never   Substance and Sexual Activity    Alcohol use: Not Currently    Drug use: Never    Sexual activity: Yes     Partners: Male     Birth control/protection: None     Social Drivers of Health     Financial Resource Strain: Low  "Risk  (5/12/2024)    Overall Financial Resource Strain (CARDIA)     Difficulty of Paying Living Expenses: Not hard at all   Food Insecurity: No Food Insecurity (5/12/2024)    Hunger Vital Sign     Worried About Running Out of Food in the Last Year: Never true     Ran Out of Food in the Last Year: Never true   Transportation Needs: No Transportation Needs (5/12/2024)    PRAPARE - Transportation     Lack of Transportation (Medical): No     Lack of Transportation (Non-Medical): No   Physical Activity: Insufficiently Active (5/12/2024)    Exercise Vital Sign     Days of Exercise per Week: 3 days     Minutes of Exercise per Session: 30 min   Stress: Stress Concern Present (5/12/2024)    [employer] - Occupational Stress Questionnaire     Feeling of Stress : To some extent   Housing Stability: Unknown (5/12/2024)    Housing Stability Vital Sign     Unable to Pay for Housing in the Last Year: No       Current Medications     Outpatient Medications Marked as Taking for the 10/10/24 encounter (Office Visit) with Di Lewis FNP   Medication Sig Dispense Refill    LINZESS 145 mcg Cap capsule Take 1 capsule (145 mcg total) by mouth before breakfast. 90 capsule 3    lisinopriL (PRINIVIL,ZESTRIL) 20 MG tablet Take 1 tablet (20 mg total) by mouth 2 (two) times daily. 90 tablet 3        I have reviewed the current medications, allergies, vital signs, past medical and surgical history, family medical history, and social history for this encounter and agree with all findings.    OBJECTIVE    Physical Exam    BP (!) 155/96   Pulse 77   Ht 5' 6" (1.676 m)   Wt 72 kg (158 lb 12.8 oz)   LMP 11/22/2021   SpO2 99%   BMI 25.63 kg/m²   GEN: Well appearing, cooperative, NAD  NECK: Supple, no LAD  CV: Normal rate  RESP: Unlabored  ABD: ND, no guarding  EXT: No clubbing, cyanosis, or edema  SKIN: Warm and dry  NEURO: AAO x4.     LABS    CBC (with or without Differential):   Lab Results   Component Value " Date    WBC 5.98 01/26/2024    HGB 13.7 01/26/2024    HCT 40.6 01/26/2024    MCV 91.0 01/26/2024    MCH 30.7 01/26/2024    MCHC 33.7 01/26/2024    RDW 12.5 01/26/2024     01/26/2024    MPV 10.6 01/26/2024    NEUTOPHILPCT 63.6 01/26/2024    DIFFTYPE Auto 01/26/2024     BMP/CMP:   Lab Results   Component Value Date     06/11/2024    K 4.6 06/11/2024     06/11/2024    CO2 30 06/11/2024    BUN 11 06/11/2024    CREATININE 0.77 06/11/2024    GLU 83 06/11/2024    CALCIUM 9.1 06/11/2024    ALBUMIN 3.8 06/11/2024    AST 26 06/11/2024    ALT 37 06/11/2024    ALKPHOS 64 06/11/2024    MG 2.1 01/11/2022        IMAGING  CT abdomen pelvis with IV contrast 09/2024  - No acute abnormality within the abdomen or pelvis to explain   patient's symptoms.   - Mild fecal stasis/constipation changes are suggested.     ASSESSMENT  Josefina Angelo is a 48 y.o. WF with history of hypertension, AFib, pelvic floor dysfunction, and chronic constipation who is referred for abdominal pain.    1. RUQ pain    2. Irritable bowel syndrome with both constipation and diarrhea    3. Nausea and vomiting, unspecified vomiting type           PLAN    - schedule EGD for severe RUQ abdominal pain, postprandial with nausea and vomiting; hx of gastric ulcers   - scheduled for upcoming GB US; consider HIDA if unremarkable   - change Linzess to Trulance for chronic constipation   - keep appt with Uro-Gyn 12/09/24  Patient Instructions   Go to the Emergency Department for any change/worsening of your symptoms.       No orders of the defined types were placed in this encounter.        The risks and benefits of my recommendations, as well as other treatment options were discussed with the patient today. All questions were answered.    45 minutes of total time spent on the encounter, which includes face to face time and non-face to face time preparing to see the patient (eg, review of tests), obtaining and/or reviewing separately obtained history,  documenting clinical information in the electronic or other health record, Independently interpreting results (not separately reported) and communicating results to the patient/family/caregiver, or care coordination (not separately reported).        KAYLEY RodriguezP/ACNP  [facility]

## 2024-10-16 ENCOUNTER — HOSPITAL ENCOUNTER (OUTPATIENT)
Dept: GASTROENTEROLOGY | Facility: HOSPITAL | Age: 49
Discharge: HOME | End: 2024-10-16
Admitting: INTERNAL MEDICINE
Payer: COMMERCIAL

## 2024-10-16 ENCOUNTER — ANESTHESIA EVENT (OUTPATIENT)
Dept: GASTROENTEROLOGY | Facility: HOSPITAL | Age: 49
End: 2024-10-16
Payer: COMMERCIAL

## 2024-10-16 ENCOUNTER — ANESTHESIA (OUTPATIENT)
Dept: GASTROENTEROLOGY | Facility: HOSPITAL | Age: 49
End: 2024-10-16
Payer: COMMERCIAL

## 2024-10-16 VITALS
HEART RATE: 57 BPM | HEIGHT: 66 IN | BODY MASS INDEX: 24.43 KG/M2 | SYSTOLIC BLOOD PRESSURE: 149 MMHG | OXYGEN SATURATION: 100 % | WEIGHT: 152 LBS | TEMPERATURE: 98 F | RESPIRATION RATE: 12 BRPM | DIASTOLIC BLOOD PRESSURE: 89 MMHG

## 2024-10-16 DIAGNOSIS — R10.11 RUQ PAIN: ICD-10-CM

## 2024-10-16 DIAGNOSIS — R11.2 NAUSEA AND VOMITING, UNSPECIFIED VOMITING TYPE: ICD-10-CM

## 2024-10-16 PROCEDURE — 63600175 PHARM REV CODE 636 W HCPCS: Performed by: NURSE ANESTHETIST, CERTIFIED REGISTERED

## 2024-10-16 PROCEDURE — 88305 TISSUE EXAM BY PATHOLOGIST: CPT | Mod: TC,SUR | Performed by: INTERNAL MEDICINE

## 2024-10-16 PROCEDURE — 27201423 OPTIME MED/SURG SUP & DEVICES STERILE SUPPLY

## 2024-10-16 PROCEDURE — 37000008 HC ANESTHESIA 1ST 15 MINUTES

## 2024-10-16 RX ORDER — SODIUM CHLORIDE 0.9 % (FLUSH) 0.9 %
10 SYRINGE (ML) INJECTION
Status: DISCONTINUED | OUTPATIENT
Start: 2024-10-16 | End: 2024-10-17 | Stop reason: HOSPADM

## 2024-10-16 RX ORDER — LIDOCAINE HYDROCHLORIDE 20 MG/ML
INJECTION, SOLUTION EPIDURAL; INFILTRATION; INTRACAUDAL; PERINEURAL
Status: DISCONTINUED | OUTPATIENT
Start: 2024-10-16 | End: 2024-10-16

## 2024-10-16 RX ORDER — FENTANYL CITRATE 50 UG/ML
INJECTION, SOLUTION INTRAMUSCULAR; INTRAVENOUS
Status: DISCONTINUED | OUTPATIENT
Start: 2024-10-16 | End: 2024-10-16

## 2024-10-16 RX ORDER — PROPOFOL 10 MG/ML
VIAL (ML) INTRAVENOUS
Status: DISCONTINUED | OUTPATIENT
Start: 2024-10-16 | End: 2024-10-16

## 2024-10-16 RX ADMIN — FENTANYL CITRATE 100 MCG: 50 INJECTION INTRAMUSCULAR; INTRAVENOUS at 08:10

## 2024-10-16 RX ADMIN — PROPOFOL 80 MG: 10 INJECTION, EMULSION INTRAVENOUS at 08:10

## 2024-10-16 RX ADMIN — PROPOFOL 40 MG: 10 INJECTION, EMULSION INTRAVENOUS at 08:10

## 2024-10-16 RX ADMIN — LIDOCAINE HYDROCHLORIDE 80 MG: 20 INJECTION, SOLUTION INTRAVENOUS at 08:10

## 2024-10-16 NOTE — DISCHARGE INSTRUCTIONS
Procedure Date  10/16/24     Impression  Overall Impression:   Grade B esophagitis in the GE junction  Moderate erythematous mucosa with erosion, consistent with gastritis in the antrum; performed cold forceps biopsies to rule out H. pylori  The upper third of the esophagus, middle third of the esophagus, lower third of the esophagus, cardia, fundus of the stomach, body of the stomach, incisura, duodenal bulb, 1st part of the duodenum and 2nd part of the duodenum appeared normal.        Recommendation  Await pathology results  Agree with Prilosec - this may help symptoms  Agree with Abd US - already scheduled  If no improvement on PPI and US unremarkable, can consider HIDA at follow up  If above is negative and symptoms persist, may benefit from low dose Elavil at night for IBS symptoms, though it could worsen your constipation - can discuss at follow up if needed  Keep appt with Dr. Black (Urogyn) in [place]   Colonoscopy UTD - repeat in 2026 for screening per Dr. Mclean   Follow up in clinic as scheduled       Outcome of procedure: successful EGD  Disposition: patient to recovery following procedure; discharge to home when appropriate parameters met  Provisions for follow up: please call my office for any unexpected symptoms like chest or abdominal pain or bleeding following your procedure.  Final Diagnosis: gastritis     NO DRIVING, OPERATING EQUIPMENT, OR SIGNING LEGAL DOCUMENTS FOR 24 HOURS.THE NURSE WILL CALL YOU WITH YOUR BIOPSY RESULTS IN A FEW DAYS. IF YOU HAVE  [facility] MYCHART YOUR RESULTS WILL APPEAR THERE AS WELL.Please call the GI Lab if you have any nausea, vomiting, or abdominal pain.

## 2024-10-16 NOTE — TRANSFER OF CARE
"Anesthesia Transfer of Care Note    Patient: Josefina Angelo    Procedure(s) Performed: * No procedures listed *    Patient location: GI    Anesthesia Type: MAC    Transport from OR: Transported from OR on room air with adequate spontaneous ventilation. Continuous ECG monitoring in transport. Continuous SpO2 monitoring in transport    Post pain: adequate analgesia    Post assessment: no apparent anesthetic complications    Post vital signs: stable    Level of consciousness: sedated and responds to stimulation    Nausea/Vomiting: no nausea/vomiting    Complications: none    Transfer of care protocol was followedComments: Good SV continue, NAD, VSS, RTRN    Last vitals: Visit Vitals  BP (!) 144/78   Pulse 61   Temp 36.4 °C (97.5 °F)   Resp 14   Ht 5' 6" (1.676 m)   Wt 68.9 kg (152 lb)   LMP 11/22/2021   SpO2 96%   Breastfeeding No   BMI 24.53 kg/m²     "

## 2024-10-16 NOTE — ANESTHESIA POSTPROCEDURE EVALUATION
Anesthesia Post Evaluation    Patient: Josefina Angelo    Procedure(s) Performed: * No procedures listed *    Final Anesthesia Type: MAC      Patient location during evaluation: GI PACU  Patient participation: Yes- Able to Participate  Level of consciousness: awake and alert  Post-procedure vital signs: reviewed and stable  Pain management: adequate  Airway patency: patent    PONV status at discharge: No PONV  Anesthetic complications: no      Cardiovascular status: blood pressure returned to baseline and hemodynamically stable  Respiratory status: spontaneous ventilation  Hydration status: euvolemic  Follow-up not needed.  Comments: Refer to nursing notes for pain/ann score upon discharge from recovery.              Vitals Value Taken Time   /83 10/16/24 0837   Temp 36.4 °C (97.5 °F) 10/16/24 0820   Pulse 52 10/16/24 0838   Resp 10 10/16/24 0838   SpO2 97 % 10/16/24 0832   Vitals shown include unfiled device data.      No case tracking events are documented in the log.      Pain/Ann Score: Ann Score: 10 (10/16/2024  8:31 AM)

## 2024-10-16 NOTE — ANESTHESIA PREPROCEDURE EVALUATION
10/16/2024  Josefina Angelo is a 49 y.o., female.    Past Medical History:   Diagnosis Date    H/O atrioventricular jocy ablation     Hypertension     Paroxysmal A-fib     Small bowel obstruction 2022       Past Surgical History:   Procedure Laterality Date    AUGMENTATION OF BREAST Bilateral     2x    CARDIAC ELECTROPHYSIOLOGY MAPPING AND ABLATION  2012     SECTION      DIAGNOSTIC LAPAROSCOPY N/A 2022    Procedure: LAPAROSCOPY, DIAGNOSTIC;  Surgeon: Alexis Calabrese MD;  Location: [facility];  Service: General;  Laterality: N/A;  LYSIS OF ADHESIONS    LEFT ARM SURGERY      LEFT LEG SURGERY      JOSE AND SCREWS    ROBOT-ASSISTED LAPAROSCOPIC HYSTERECTOMY N/A 2021    Procedure: ROBOTIC HYSTERECTOMY,POSS BSO,CYSTO ;  Surgeon: Young Jackson MD;  Location: [facility];  Service: OB/GYN;  Laterality: N/A;    ROBOT-ASSISTED LAPAROSCOPIC SALPINGO-OOPHORECTOMY Left 2021    Procedure: ROBOTIC SALPINGO-OOPHORECTOMY;  Surgeon: Young Jackson MD;  Location: [facility];  Service: OB/GYN;  Laterality: Left;    TUBAL LIGATION      tummy tuck      VAGINAL DELIVERY      X 2       Family History   Problem Relation Name Age of Onset    Colon cancer Mother      Lung cancer Mother      Lung disease Mother      Hypertension Father      Heart disease Father      Heart disease Paternal Grandmother         Social History     Socioeconomic History    Marital status: Other   Tobacco Use    Smoking status: Never     Passive exposure: Never    Smokeless tobacco: Never   Substance and Sexual Activity    Alcohol use: Not Currently    Drug use: Never    Sexual activity: Yes     Partners: Male     Birth control/protection: None     Social Drivers of Health     Financial Resource Strain: Low Risk  (2024)    Overall Financial Resource Strain (CARDIA)     Difficulty of Paying Living Expenses: Not  hard at all   Food Insecurity: No Food Insecurity (5/12/2024)    Hunger Vital Sign     Worried About Running Out of Food in the Last Year: Never true     Ran Out of Food in the Last Year: Never true   Transportation Needs: No Transportation Needs (5/12/2024)    PRAPARE - Transportation     Lack of Transportation (Medical): No     Lack of Transportation (Non-Medical): No   Physical Activity: Insufficiently Active (5/12/2024)    Exercise Vital Sign     Days of Exercise per Week: 3 days     Minutes of Exercise per Session: 30 min   Stress: Stress Concern Present (5/12/2024)    [employer] - Occupational Stress Questionnaire     Feeling of Stress : To some extent   Housing Stability: Unknown (5/12/2024)    Housing Stability Vital Sign     Unable to Pay for Housing in the Last Year: No       Current Outpatient Medications   Medication Sig Dispense Refill    LINZESS 145 mcg Cap capsule Take 1 capsule (145 mcg total) by mouth before breakfast. 90 capsule 3    lisinopriL (PRINIVIL,ZESTRIL) 20 MG tablet Take 1 tablet (20 mg total) by mouth 2 (two) times daily. 90 tablet 3    omeprazole (PRILOSEC) 40 MG capsule Take 1 capsule (40 mg total) by mouth once daily. 30 capsule 11    phentermine (ADIPEX-P) 37.5 mg tablet Take 37.5 mg by mouth every morning.      plecanatide (TRULANCE) 3 mg Tab Take 3 mg by mouth once daily. 90 tablet 3     No current facility-administered medications for this encounter.       Review of patient's allergies indicates:  No Known Allergies     Pre-op Assessment    I have reviewed the Patient Summary Reports.     I have reviewed the Nursing Notes. I have reviewed the NPO Status.   I have reviewed the Medications.     Review of Systems  Anesthesia Hx:  No problems with previous Anesthesia                Social:  Non-Smoker       Cardiovascular:     Hypertension           hyperlipidemia                         Hypertension     Atrial Fibrillation     Hepatic/GI:     GERD                 Neurological:    Neuromuscular Disease,                                 Neuromuscular Disease       Physical Exam  General: Well nourished        Anesthesia Plan  Type of Anesthesia, risks & benefits discussed:    Anesthesia Type: MAC  Intra-op Monitoring Plan: Standard ASA Monitors  Post Op Pain Control Plan: IV/PO Opioids PRN  Induction:  IV  Informed Consent: Informed consent signed with the Patient and all parties understand the risks and agree with anesthesia plan.  All questions answered. Patient consented to blood products? Yes  ASA Score: 2  Day of Surgery Review of History & Physical: H&P Update referred to the surgeon/provider.I have interviewed and examined the patient. I have reviewed the patient's H&P dated: There are no significant changes.     Ready For Surgery From Anesthesia Perspective.     .

## 2024-10-16 NOTE — H&P
Gastroenterology Pre-procedure H&P    History of Present Illness    Josefina Angelo is a 49 y.o. female that  has a past medical history of H/O atrioventricular jocy ablation, Hypertension, Paroxysmal A-fib, and Small bowel obstruction (2022).     Patient with abdominal pain, h/o PUD, nausea here for EGD. 2 prior colonoscopies -  ([facility]) and  ([facility])      Past Medical History:   Diagnosis Date    H/O atrioventricular jocy ablation     Hypertension     Paroxysmal A-fib     Small bowel obstruction 2022       Past Surgical History:   Procedure Laterality Date    AUGMENTATION OF BREAST Bilateral     2x    CARDIAC ELECTROPHYSIOLOGY MAPPING AND ABLATION       SECTION      DIAGNOSTIC LAPAROSCOPY N/A 2022    Procedure: LAPAROSCOPY, DIAGNOSTIC;  Surgeon: Alexis Calabrese MD;  Location: [facility];  Service: General;  Laterality: N/A;  LYSIS OF ADHESIONS    LEFT ARM SURGERY      LEFT LEG SURGERY      JOSE AND SCREWS    ROBOT-ASSISTED LAPAROSCOPIC HYSTERECTOMY N/A 2021    Procedure: ROBOTIC HYSTERECTOMY,POSS BSO,CYSTO ;  Surgeon: Young Jackson MD;  Location: [facility];  Service: OB/GYN;  Laterality: N/A;    ROBOT-ASSISTED LAPAROSCOPIC SALPINGO-OOPHORECTOMY Left 2021    Procedure: ROBOTIC SALPINGO-OOPHORECTOMY;  Surgeon: Young Jackson MD;  Location: [facility];  Service: OB/GYN;  Laterality: Left;    TUBAL LIGATION      tummy tuck      VAGINAL DELIVERY      X 2       Family History   Problem Relation Name Age of Onset    Colon cancer Mother      Lung cancer Mother      Lung disease Mother      Hypertension Father      Heart disease Father      Heart disease Paternal Grandmother         Social History     Socioeconomic History    Marital status: Other   Tobacco Use    Smoking status: Never     Passive exposure: Never    Smokeless tobacco: Never   Substance and Sexual Activity    Alcohol use: Not Currently    Drug use: Never    Sexual activity: Yes     Partners: Male      Birth control/protection: None     Social Drivers of Health     Financial Resource Strain: Low Risk  (5/12/2024)    Overall Financial Resource Strain (CARDIA)     Difficulty of Paying Living Expenses: Not hard at all   Food Insecurity: No Food Insecurity (5/12/2024)    Hunger Vital Sign     Worried About Running Out of Food in the Last Year: Never true     Ran Out of Food in the Last Year: Never true   Transportation Needs: No Transportation Needs (5/12/2024)    PRAPARE - Transportation     Lack of Transportation (Medical): No     Lack of Transportation (Non-Medical): No   Physical Activity: Insufficiently Active (5/12/2024)    Exercise Vital Sign     Days of Exercise per Week: 3 days     Minutes of Exercise per Session: 30 min   Stress: Stress Concern Present (5/12/2024)    [employer] - Occupational Stress Questionnaire     Feeling of Stress : To some extent   Housing Stability: Unknown (5/12/2024)    Housing Stability Vital Sign     Unable to Pay for Housing in the Last Year: No       Current Outpatient Medications   Medication Sig Dispense Refill    LINZESS 145 mcg Cap capsule Take 1 capsule (145 mcg total) by mouth before breakfast. 90 capsule 3    lisinopriL (PRINIVIL,ZESTRIL) 20 MG tablet Take 1 tablet (20 mg total) by mouth 2 (two) times daily. 90 tablet 3    omeprazole (PRILOSEC) 40 MG capsule Take 1 capsule (40 mg total) by mouth once daily. 30 capsule 11    phentermine (ADIPEX-P) 37.5 mg tablet Take 37.5 mg by mouth every morning.      plecanatide (TRULANCE) 3 mg Tab Take 3 mg by mouth once daily. 90 tablet 3     No current facility-administered medications for this encounter.       Review of patient's allergies indicates:  No Known Allergies    Objective:  There were no vitals filed for this visit.     GEN: normal appearing, NAD, AAO x3  HENT: NCAT, anicteric, OP benign  CV: normal rate, regular rhythm  RESP: CTA, symmetric rise, unlabored  ABD: soft, ND, no guarding or  TTP  SKIN: warm and dry  NEURO: grossly afocal    Assessment and Plan:    Proceed with:    EGD for chronic postprandial abdominal pain       Bruno Beyer MD  Gastroenterology

## 2024-10-17 LAB
ESTROGEN SERPL-MCNC: NORMAL PG/ML
INSULIN SERPL-ACNC: NORMAL U[IU]/ML
LAB AP GROSS DESCRIPTION: NORMAL
LAB AP LABORATORY NOTES: NORMAL
T3RU NFR SERPL: NORMAL %

## 2024-10-21 ENCOUNTER — HOSPITAL ENCOUNTER (OUTPATIENT)
Dept: RADIOLOGY | Facility: HOSPITAL | Age: 49
Discharge: HOME | End: 2024-10-21
Attending: NURSE PRACTITIONER
Payer: COMMERCIAL

## 2024-10-21 DIAGNOSIS — R10.9 ABDOMINAL PAIN, UNSPECIFIED ABDOMINAL LOCATION: ICD-10-CM

## 2024-10-21 PROCEDURE — 76705 ECHO EXAM OF ABDOMEN: CPT | Mod: TC

## 2024-10-21 PROCEDURE — 76705 ECHO EXAM OF ABDOMEN: CPT | Mod: 26,,, | Performed by: RADIOLOGY

## 2024-10-29 ENCOUNTER — PATIENT MESSAGE (OUTPATIENT)
Dept: GASTROENTEROLOGY | Facility: CLINIC | Age: 49
End: 2024-10-29
Payer: COMMERCIAL

## 2024-10-29 DIAGNOSIS — R10.10 UPPER ABDOMINAL PAIN, UNSPECIFIED: Primary | ICD-10-CM

## 2024-11-14 ENCOUNTER — HOSPITAL ENCOUNTER (OUTPATIENT)
Dept: RADIOLOGY | Facility: HOSPITAL | Age: 49
Discharge: HOME | End: 2024-11-14
Payer: COMMERCIAL

## 2024-11-14 DIAGNOSIS — R10.10 UPPER ABDOMINAL PAIN, UNSPECIFIED: ICD-10-CM

## 2024-11-14 PROCEDURE — 78227 HEPATOBIL SYST IMAGE W/DRUG: CPT | Mod: TC

## 2024-11-14 PROCEDURE — 78227 HEPATOBIL SYST IMAGE W/DRUG: CPT | Mod: 26,,, | Performed by: NUCLEAR MEDICINE

## 2024-11-14 PROCEDURE — A9537 TC99M MEBROFENIN: HCPCS

## 2024-11-14 RX ORDER — KIT FOR THE PREPARATION OF TECHNETIUM TC 99M MEBROFENIN 45 MG/10ML
5 INJECTION, POWDER, LYOPHILIZED, FOR SOLUTION INTRAVENOUS
Status: COMPLETED | OUTPATIENT
Start: 2024-11-14 | End: 2024-11-14

## 2024-11-14 RX ADMIN — MEBROFENIN 5 MILLICURIE: 45 INJECTION, POWDER, LYOPHILIZED, FOR SOLUTION INTRAVENOUS at 09:11

## 2024-11-15 ENCOUNTER — PATIENT MESSAGE (OUTPATIENT)
Dept: GASTROENTEROLOGY | Facility: CLINIC | Age: 49
End: 2024-11-15
Payer: COMMERCIAL

## 2024-11-18 ENCOUNTER — TELEPHONE (OUTPATIENT)
Dept: GASTROENTEROLOGY | Facility: CLINIC | Age: 49
End: 2024-11-18
Payer: COMMERCIAL

## 2024-11-18 DIAGNOSIS — R10.11 RUQ PAIN: Primary | ICD-10-CM

## 2024-11-18 NOTE — TELEPHONE ENCOUNTER
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  I will place a referral to General surgery.  She can keep her scheduled appointment with me.

## 2024-11-20 ENCOUNTER — PATIENT MESSAGE (OUTPATIENT)
Dept: ADMINISTRATIVE | Facility: HOSPITAL | Age: 49
End: 2024-11-20

## 2024-11-25 ENCOUNTER — CLINICAL SUPPORT (OUTPATIENT)
Dept: CARDIOLOGY | Facility: CLINIC | Age: 49
End: 2024-11-25
Attending: SURGERY
Payer: COMMERCIAL

## 2024-11-25 ENCOUNTER — OFFICE VISIT (OUTPATIENT)
Dept: SURGERY | Facility: CLINIC | Age: 49
End: 2024-11-25
Attending: SURGERY
Payer: COMMERCIAL

## 2024-11-25 VITALS — WEIGHT: 155 LBS | BODY MASS INDEX: 24.91 KG/M2 | HEIGHT: 66 IN

## 2024-11-25 DIAGNOSIS — Z01.818 PRE-PROCEDURAL EXAMINATION: ICD-10-CM

## 2024-11-25 DIAGNOSIS — R10.11 RUQ PAIN: Primary | ICD-10-CM

## 2024-11-25 PROCEDURE — 3008F BODY MASS INDEX DOCD: CPT | Mod: S$GLB,,, | Performed by: SURGERY

## 2024-11-25 PROCEDURE — 1159F MED LIST DOCD IN RCRD: CPT | Mod: S$GLB,,, | Performed by: SURGERY

## 2024-11-25 PROCEDURE — 99999 PR PBB SHADOW E&M-EST. PATIENT-LVL V: CPT | Mod: PBBFAC,,, | Performed by: SURGERY

## 2024-11-25 PROCEDURE — 99999 PR PBB SHADOW E&M-EST. PATIENT-LVL II: CPT | Mod: PBBFAC,,,

## 2024-11-25 PROCEDURE — 99203 OFFICE O/P NEW LOW 30 MIN: CPT | Mod: S$GLB,,, | Performed by: SURGERY

## 2024-11-25 PROCEDURE — 4010F ACE/ARB THERAPY RXD/TAKEN: CPT | Mod: S$GLB,,, | Performed by: SURGERY

## 2024-11-25 RX ORDER — SODIUM CHLORIDE 9 MG/ML
INJECTION, SOLUTION INTRAVENOUS CONTINUOUS
Status: CANCELLED | OUTPATIENT
Start: 2024-11-25

## 2024-11-25 RX ORDER — CEFAZOLIN SODIUM 2 G/50ML
2 SOLUTION INTRAVENOUS
Status: CANCELLED | OUTPATIENT
Start: 2024-11-25

## 2024-11-25 NOTE — PROGRESS NOTES
General Surgery History and Physical      Patient ID: Josefina Angelo is a 49 y.o. female.    Chief Complaint: Abdominal Pain (gallbladder)      HPI:  Year old female comes in with a few month history of just severe postprandial nausea vomiting bloating and pain.  Told he has been in the epigastric area.  He has been getting progressively worse over the last couple weeks went to the ER last week.  They would do a HIDA scan by her GI doctor ordered 1 and it showed a hyperkinetic gallbladder.  Currently Hector anything more than grits.  Getting dehydrated.  Still in pain in my office today.    Review of Systems   Constitutional:  Negative for activity change, appetite change, fatigue and fever.   HENT:  Negative for trouble swallowing.    Respiratory:  Negative for cough and shortness of breath.    Cardiovascular:  Negative for chest pain and palpitations.   Gastrointestinal:  Positive for abdominal pain, nausea and vomiting. Negative for abdominal distention, blood in stool, constipation and diarrhea.   Genitourinary:  Negative for flank pain.   Musculoskeletal:  Negative for neck pain and neck stiffness.   Neurological:  Negative for weakness.       Current Outpatient Medications   Medication Sig Dispense Refill    lisinopriL (PRINIVIL,ZESTRIL) 20 MG tablet Take 1 tablet (20 mg total) by mouth 2 (two) times daily. 90 tablet 3    omeprazole (PRILOSEC) 40 MG capsule Take 1 capsule (40 mg total) by mouth once daily. 30 capsule 11    plecanatide (TRULANCE) 3 mg Tab Take 3 mg by mouth once daily. 90 tablet 3    LINZESS 145 mcg Cap capsule Take 1 capsule (145 mcg total) by mouth before breakfast. 90 capsule 3    phentermine (ADIPEX-P) 37.5 mg tablet Take 37.5 mg by mouth every morning.       No current facility-administered medications for this visit.       Review of patient's allergies indicates:  No Known Allergies    Past Medical  History:   Diagnosis Date    H/O atrioventricular jocy ablation     Hypertension     Paroxysmal A-fib     Small bowel obstruction 2022       Past Surgical History:   Procedure Laterality Date    AUGMENTATION OF BREAST Bilateral     2x    CARDIAC ELECTROPHYSIOLOGY MAPPING AND ABLATION  2012     SECTION      DIAGNOSTIC LAPAROSCOPY N/A 2022    Procedure: LAPAROSCOPY, DIAGNOSTIC;  Surgeon: Alexis Calabrese MD;  Location: [facility];  Service: General;  Laterality: N/A;  LYSIS OF ADHESIONS    LEFT ARM SURGERY      LEFT LEG SURGERY      JOSE AND SCREWS    ROBOT-ASSISTED LAPAROSCOPIC HYSTERECTOMY N/A 2021    Procedure: ROBOTIC HYSTERECTOMY,POSS BSO,CYSTO ;  Surgeon: Young Jackson MD;  Location: [facility];  Service: OB/GYN;  Laterality: N/A;    ROBOT-ASSISTED LAPAROSCOPIC SALPINGO-OOPHORECTOMY Left 2021    Procedure: ROBOTIC SALPINGO-OOPHORECTOMY;  Surgeon: Young Jackson MD;  Location: [facility];  Service: OB/GYN;  Laterality: Left;    TUBAL LIGATION      tummy tuck      VAGINAL DELIVERY      X 2       Family History   Problem Relation Name Age of Onset    Colon cancer Mother      Lung cancer Mother      Lung disease Mother      Hypertension Father      Heart disease Father      Heart disease Paternal Grandmother         Social History     Socioeconomic History    Marital status: Other   Tobacco Use    Smoking status: Never     Passive exposure: Never    Smokeless tobacco: Never   Substance and Sexual Activity    Alcohol use: Not Currently    Drug use: Never    Sexual activity: Yes     Partners: Male     Birth control/protection: None     Social Drivers of Health     Financial Resource Strain: Low Risk  (2024)    Overall Financial Resource Strain (CARDIA)     Difficulty of Paying Living Expenses: Not hard at all   Food Insecurity: No Food Insecurity (2024)    Hunger Vital Sign     Worried About Running Out of Food in the Last Year: Never true     Ran Out of Food in  the Last Year: Never true   Transportation Needs: No Transportation Needs (5/12/2024)    PRAPARE - Transportation     Lack of Transportation (Medical): No     Lack of Transportation (Non-Medical): No   Physical Activity: Insufficiently Active (5/12/2024)    Exercise Vital Sign     Days of Exercise per Week: 3 days     Minutes of Exercise per Session: 30 min   Stress: Stress Concern Present (5/12/2024)    [employer] - Occupational Stress Questionnaire     Feeling of Stress : To some extent   Housing Stability: Unknown (5/12/2024)    Housing Stability Vital Sign     Unable to Pay for Housing in the Last Year: No       There were no vitals filed for this visit.    Physical Exam  Constitutional:       General: She is not in acute distress.  HENT:      Head: Normocephalic.   Cardiovascular:      Rate and Rhythm: Normal rate and regular rhythm.      Pulses: Normal pulses.   Pulmonary:      Effort: Pulmonary effort is normal. No respiratory distress.      Breath sounds: Normal breath sounds.   Abdominal:      General: Abdomen is flat. There is no distension.      Palpations: Abdomen is soft.      Tenderness: There is abdominal tenderness.   Musculoskeletal:         General: Normal range of motion.   Skin:     General: Skin is warm.   Neurological:      General: No focal deficit present.      Mental Status: She is oriented to person, place, and time.     Narrative & Impression  EXAMINATION:  NM HEPATOBILIARY(HIDA) WITH PHARM AND EF WHEN PERFORMED     CLINICAL HISTORY:  Abdominal pain, upper, chronic, assess gallbladder motility;Upper abdominal pain, unspecified     TECHNIQUE:  Following the IV administration of 5 mCi of Tc-99m-mebrofenin, sequential dynamic anterior images of the abdomen were obtained for 60 minutes followed by a right lateral view at 60 minutes.     Subsequently, the patient received 8 ounces Boost orally  and additional imaging was performed for 30 minutes. Time activity  curves signed up gallbladder ejection fraction was calculated     COMPARISON:  10/21/24 US     FINDINGS:  The early images demonstrate homogeneous uptake of the radiopharmaceutical by the liver with no focal hepatic abnormalities.     Normal activity is present in the common bile duct, gallbladder, and small bowel.     Following the oral  administration of Boost and delayed imaging, there is normal contraction of gallbladder with GBEF of 89%(normal less than normal 36%     Impression:     The cystic and common jaqui ducts are patent.     Normal  contraction of the gallbladder with  GBEF of 89% (normal less than normal 36%) after boost oral stimulation        Electronically signed by:Melinda Ramirez  Date:                                            11/14/2024  Time:                                           11:38    Assessment & Plan:    RUQ pain  -     Ambulatory referral/consult to General Surgery  -     Case Request Operating Room: CHOLECYSTECTOMY, LAPAROSCOPIC    Pre-procedural examination  -     CBC Auto Differential; Future; Expected date: 11/25/2024  -     Comprehensive Metabolic Panel; Future; Expected date: 11/25/2024  -     EKG 12-lead; Future    Other orders  -     Place in Outpatient; Standing  -     Vital signs; Standing  -     Insert peripheral IV; Standing  -     Diet NPO; Standing  -     0.9% NaCl infusion  -     IP VTE LOW RISK PATIENT; Standing  -     Place SOUMYA hose; Standing  -     cefazolin (ANCEF) 2 gram in dextrose 5% 50 mL IVPB (premix)  -     Pulse Oximetry Q4H; Standing  -     Full code; Standing        Patient will go to the operating room tomorrow for urgent laparoscopic cholecystectomy.  She was not eating well and having constant pain even now in my office.  Risks and benefits were explained to the patient clear risk of bleeding, infection, open operation, injury to surrounding organs, possible retained infection or pain after the operation.  All questions were answered.

## 2024-11-25 NOTE — H&P (VIEW-ONLY)
General Surgery History and Physical      Patient ID: Josefina Angelo is a 49 y.o. female.    Chief Complaint: Abdominal Pain (gallbladder)      HPI:  Year old female comes in with a few month history of just severe postprandial nausea vomiting bloating and pain.  Told he has been in the epigastric area.  He has been getting progressively worse over the last couple weeks went to the ER last week.  They would do a HIDA scan by her GI doctor ordered 1 and it showed a hyperkinetic gallbladder.  Currently Hector anything more than grits.  Getting dehydrated.  Still in pain in my office today.    Review of Systems   Constitutional:  Negative for activity change, appetite change, fatigue and fever.   HENT:  Negative for trouble swallowing.    Respiratory:  Negative for cough and shortness of breath.    Cardiovascular:  Negative for chest pain and palpitations.   Gastrointestinal:  Positive for abdominal pain, nausea and vomiting. Negative for abdominal distention, blood in stool, constipation and diarrhea.   Genitourinary:  Negative for flank pain.   Musculoskeletal:  Negative for neck pain and neck stiffness.   Neurological:  Negative for weakness.       Current Outpatient Medications   Medication Sig Dispense Refill    lisinopriL (PRINIVIL,ZESTRIL) 20 MG tablet Take 1 tablet (20 mg total) by mouth 2 (two) times daily. 90 tablet 3    omeprazole (PRILOSEC) 40 MG capsule Take 1 capsule (40 mg total) by mouth once daily. 30 capsule 11    plecanatide (TRULANCE) 3 mg Tab Take 3 mg by mouth once daily. 90 tablet 3    LINZESS 145 mcg Cap capsule Take 1 capsule (145 mcg total) by mouth before breakfast. 90 capsule 3    phentermine (ADIPEX-P) 37.5 mg tablet Take 37.5 mg by mouth every morning.       No current facility-administered medications for this visit.       Review of patient's allergies indicates:  No Known Allergies    Past Medical  History:   Diagnosis Date    H/O atrioventricular jocy ablation     Hypertension     Paroxysmal A-fib     Small bowel obstruction 2022       Past Surgical History:   Procedure Laterality Date    AUGMENTATION OF BREAST Bilateral     2x    CARDIAC ELECTROPHYSIOLOGY MAPPING AND ABLATION  2012     SECTION      DIAGNOSTIC LAPAROSCOPY N/A 2022    Procedure: LAPAROSCOPY, DIAGNOSTIC;  Surgeon: Alexis Calabrese MD;  Location: [facility];  Service: General;  Laterality: N/A;  LYSIS OF ADHESIONS    LEFT ARM SURGERY      LEFT LEG SURGERY      JOSE AND SCREWS    ROBOT-ASSISTED LAPAROSCOPIC HYSTERECTOMY N/A 2021    Procedure: ROBOTIC HYSTERECTOMY,POSS BSO,CYSTO ;  Surgeon: Young Jackson MD;  Location: [facility];  Service: OB/GYN;  Laterality: N/A;    ROBOT-ASSISTED LAPAROSCOPIC SALPINGO-OOPHORECTOMY Left 2021    Procedure: ROBOTIC SALPINGO-OOPHORECTOMY;  Surgeon: Young Jackson MD;  Location: [facility];  Service: OB/GYN;  Laterality: Left;    TUBAL LIGATION      tummy tuck      VAGINAL DELIVERY      X 2       Family History   Problem Relation Name Age of Onset    Colon cancer Mother      Lung cancer Mother      Lung disease Mother      Hypertension Father      Heart disease Father      Heart disease Paternal Grandmother         Social History     Socioeconomic History    Marital status: Other   Tobacco Use    Smoking status: Never     Passive exposure: Never    Smokeless tobacco: Never   Substance and Sexual Activity    Alcohol use: Not Currently    Drug use: Never    Sexual activity: Yes     Partners: Male     Birth control/protection: None     Social Drivers of Health     Financial Resource Strain: Low Risk  (2024)    Overall Financial Resource Strain (CARDIA)     Difficulty of Paying Living Expenses: Not hard at all   Food Insecurity: No Food Insecurity (2024)    Hunger Vital Sign     Worried About Running Out of Food in the Last Year: Never true     Ran Out of Food in  the Last Year: Never true   Transportation Needs: No Transportation Needs (5/12/2024)    PRAPARE - Transportation     Lack of Transportation (Medical): No     Lack of Transportation (Non-Medical): No   Physical Activity: Insufficiently Active (5/12/2024)    Exercise Vital Sign     Days of Exercise per Week: 3 days     Minutes of Exercise per Session: 30 min   Stress: Stress Concern Present (5/12/2024)    [employer] [place] [employer] - Occupational Stress Questionnaire     Feeling of Stress : To some extent   Housing Stability: Unknown (5/12/2024)    Housing Stability Vital Sign     Unable to Pay for Housing in the Last Year: No       There were no vitals filed for this visit.    Physical Exam  Constitutional:       General: She is not in acute distress.  HENT:      Head: Normocephalic.   Cardiovascular:      Rate and Rhythm: Normal rate and regular rhythm.      Pulses: Normal pulses.   Pulmonary:      Effort: Pulmonary effort is normal. No respiratory distress.      Breath sounds: Normal breath sounds.   Abdominal:      General: Abdomen is flat. There is no distension.      Palpations: Abdomen is soft.      Tenderness: There is abdominal tenderness.   Musculoskeletal:         General: Normal range of motion.   Skin:     General: Skin is warm.   Neurological:      General: No focal deficit present.      Mental Status: She is oriented to person, place, and time.     Narrative & Impression  EXAMINATION:  NM HEPATOBILIARY(HIDA) WITH PHARM AND EF WHEN PERFORMED     CLINICAL HISTORY:  Abdominal pain, upper, chronic, assess gallbladder motility;Upper abdominal pain, unspecified     TECHNIQUE:  Following the IV administration of 5 mCi of Tc-99m-mebrofenin, sequential dynamic anterior images of the abdomen were obtained for 60 minutes followed by a right lateral view at 60 minutes.     Subsequently, the patient received 8 ounces Boost orally  and additional imaging was performed for 30 minutes. Time activity  curves signed up gallbladder ejection fraction was calculated     COMPARISON:  10/21/24 US     FINDINGS:  The early images demonstrate homogeneous uptake of the radiopharmaceutical by the liver with no focal hepatic abnormalities.     Normal activity is present in the common bile duct, gallbladder, and small bowel.     Following the oral  administration of Boost and delayed imaging, there is normal contraction of gallbladder with GBEF of 89%(normal less than normal 36%     Impression:     The cystic and common jaqui ducts are patent.     Normal  contraction of the gallbladder with  GBEF of 89% (normal less than normal 36%) after boost oral stimulation        Electronically signed by:Melinda Ramirez  Date:                                            11/14/2024  Time:                                           11:38    Assessment & Plan:    RUQ pain  -     Ambulatory referral/consult to General Surgery  -     Case Request Operating Room: CHOLECYSTECTOMY, LAPAROSCOPIC    Pre-procedural examination  -     CBC Auto Differential; Future; Expected date: 11/25/2024  -     Comprehensive Metabolic Panel; Future; Expected date: 11/25/2024  -     EKG 12-lead; Future    Other orders  -     Place in Outpatient; Standing  -     Vital signs; Standing  -     Insert peripheral IV; Standing  -     Diet NPO; Standing  -     0.9% NaCl infusion  -     IP VTE LOW RISK PATIENT; Standing  -     Place SOUMYA hose; Standing  -     cefazolin (ANCEF) 2 gram in dextrose 5% 50 mL IVPB (premix)  -     Pulse Oximetry Q4H; Standing  -     Full code; Standing        Patient will go to the operating room tomorrow for urgent laparoscopic cholecystectomy.  She was not eating well and having constant pain even now in my office.  Risks and benefits were explained to the patient clear risk of bleeding, infection, open operation, injury to surrounding organs, possible retained infection or pain after the operation.  All questions were answered.

## 2024-11-25 NOTE — PATIENT INSTRUCTIONS
[facility]  Instructions for surgery        Your surgery is scheduled for 11/26/2024 at [facility] on the 1st floor of the [facility] building.Your arrival time is 9 a.m   You will be notified the day before  surgery verifying your arrival time for surgery.    Your preop lab work will be done today. Lab is on the 1st floor of the clinic. EKG is on 2nd floor of the clinic.                                                                                                                                                                                                                                                                                                                                                                           Day of Surgery Instructions      Bring a list of all your medications with you the day of your surgery. You can also give the list to your doctor or nurse during your final clinic appointment before surgery.      Do not eat any solid foods or drink any liquids after 12:00 AM (midnight). This includes gum, hard candy, mints, and chewing tobacco.  Medications: Take any medications specified with a small sip of water the morning of your surgery.  Brush your teeth: You may brush your teeth and rinse your mouth. Do not swallow any water or toothpaste.  Clothing: A button front shirt and loose-fitting clothes are the most comfortable before and after surgery. We also recommend low-heeled shoes.  Hair: Avoid buns, ponytails, or hairpieces at the back of the head. Remove or avoid any clips, pins or bands that bind hair. Do not use hairspray. Before going to surgery, you will need to remove any wigs or hairpieces.  We will cover your hair during surgery. Your privacy regarding personal appearance will be respected.  Fingernails: Please be sure to remove all nail polish before you arrive for surgery. We understand that tips, wraps, gels, etc., are  expensive; however, we ask these products to be removed from at least one finger on each hand. Your fingertips are used to accurately monitor your oxygen level during surgery by a device called an oximeter.  Glasses and Contact Lenses: Wear glasses when possible. If contact lenses must be worn, bring a lens case and solution. If glasses are worn, bring a case for them.  Hearing Aids: If you rely on a hearing aid, wear it to the hospital on the day of surgery. This will ensure you can hear and understand everything we need to communicate with you.  Valuables: Jewelry, including body piercings, Dentures, money, and credit cards should be left at home. [facility] is not responsible for valuables that are not secured in our surgery center.  Makeup, Perfume, Creams, Lotions and Deodorants: Do not use any of these products on the day of surgery. Remove false eyelashes prior to surgery.  Implanted Medical Devices: If you have an implanted device, such as a pacemaker or AICD, bring the device information card (if you have it) with you.  Medical Equipment: If you have been fitted for a brace to wear after surgery or you have been given crutches, bring those with you to the surgery center.  Shower: Take a shower with Hibiclens® (chlorhexidine) (available over the counter). This reduces the chance of infection. PLEASE USE CHLORHEXIDINE WASH THE NIGHT BEFORE SURGERY AND THE MORNING OF SURGERY.  ONLY 2 VISITORS ARE ALLOWED WITH YOU ON DAY OF SURGERY.        Medication instructions:  You may take blood pressure medication with a small drink of water the morning of surgery.    Stop your blood thinner  days before surgery    IF YOU ARE ON ANY OF THESE BLOOD THINNERS, MAKE SURE YOUR PHYSICIAN IS AWARE.  Eliquis/Apixaban            Wafarin/Coumadin,Jantoven  Xarelto/Rivaroxaban      Pletal/Cilostazol  Plavix/Clopidogrel          Pradaxa/Dibigatran      If you are diabetic      Follow the diabetic medicine instructions you received  during your pre-operative visit.  DO NOT take your insulin or diabetic medications the morning of surgery.  When you arrive at the surgical center, be sure to tell the nurse you are diabetic.    The following blood sugar medications have to be stopped prior to surgery:    Hold 24 hours prior to surgery:    Libraglutide - Saxenda, Victoza  Lixisenatide --Adlxyin  Exenatide  --  Byetta  Empaglifozin--Jardiance  Sitaglitin--Januvia    Hold 1 week prior to surgery:    Semaglutide - Ozempic, Wegouy, Rybelsus  Dulaglutide - Trulicity  Tirzepatide - Mounjaro  Exenatide (extended release inj)-- Bydureon BCise      Hold 48 hours prior to surgery:    Metformin, Glucovance, Metaglip, Fortamet, Glucophage, Riomet, Avandamet, Glimepiride            Other Items to bring with you and know    Insurance card  Identification card such as 's license, passport, or other picture ID  Copy of your advance directives  List of medications and allergies, if not already provided  Name and phone number of person to contact if your condition changes significantly. YOU CANNOT DRIVE YOURSELF HOME FROM THE HOSPITAL THE DAY OF SURGERY.  PLEASE UNDERSTAND THAT OUR OFFICE DOES NOT GIVE PATHOLOGY RESULTS OR TEST RESULTS OVER THE PHONE. THIS WILL BE DISCUSSED WITH YOU ON YOUR FOLLOW UP APPOINTMENT.  IF YOU SUBMIT FMLA FORMS, IT WILL TAKE 3-7 DAYS TO COMPLETE THESE          Alcohol and Surgery  We want to help you prepare for and recover from surgery as quickly and safely as possible. Be open and honest with your provider about how many drinks you have per day. Excessive alcohol use is defined as drinking more than three drinks per day. It can affect the outcome of your surgery. Binge drinking (consuming large amounts of alcohol infrequently, such as on weekends) can also affect the outcome of your surgery.  Alcohol withdrawal  If you drink more than three drinks a day, you could have a complication, called alcohol withdrawal, after  surgery.  Alcohol withdrawal is a set of symptoms that people have when they suddenly stop drinking after using alcohol  for a long time. During withdrawal, a person's central nervous system overreacts. This can cause mild symptoms such as shakiness, sweating or hallucinating. It can also cause other more serious side effects. If not treated properly, alcohol withdrawal can cause potentially life-threatening complications after surgery. This can include tremors, seizures, hallucinations, delirium tremors, and even death. Untreated alcohol withdrawal often leads to a longer stay in the hospital, potentially in the Intensive Care Unit.  Chronic heavy drinking also can interfere with several organ systems and biochemical processes in the body.  This interference can cause serious, even life-threatening complications.  Your care team can offer alcohol withdrawal treatment to help:  Decrease the risk of seizures and delirium tremors after surgery  Decrease the risk we will need to restrain you for your own safety or the safety of others  Decrease your risk of falling after surgery  Reduce the use of potent sedative medications  Reduce the time you stay in the hospital after surgery  Reduce the time you might spend on a mechanical ventilator to help you breathe  Lower incidence of organ failure and biochemical complications  Talk to a member of your care team or your primary care physician about your alcohol use if you feel you may be at risk of any of these complications.        Smoking and Surgery  Quitting smoking is extremely important for a successful surgery and recovery. Cigarette smoking compromises your immune system. This increases your risk of an infection after surgery. Quitting the habit before surgery will decrease the surgical risks associated with smoking.

## 2024-11-26 ENCOUNTER — HOSPITAL ENCOUNTER (OUTPATIENT)
Facility: HOSPITAL | Age: 49
Discharge: HOME | End: 2024-11-26
Attending: SURGERY | Admitting: SURGERY
Payer: COMMERCIAL

## 2024-11-26 ENCOUNTER — ANESTHESIA (OUTPATIENT)
Dept: SURGERY | Facility: HOSPITAL | Age: 49
End: 2024-11-26
Payer: COMMERCIAL

## 2024-11-26 ENCOUNTER — ANESTHESIA EVENT (OUTPATIENT)
Dept: SURGERY | Facility: HOSPITAL | Age: 49
End: 2024-11-26
Payer: COMMERCIAL

## 2024-11-26 VITALS
TEMPERATURE: 98 F | OXYGEN SATURATION: 97 % | RESPIRATION RATE: 16 BRPM | SYSTOLIC BLOOD PRESSURE: 121 MMHG | WEIGHT: 155 LBS | HEART RATE: 78 BPM | DIASTOLIC BLOOD PRESSURE: 73 MMHG | BODY MASS INDEX: 24.91 KG/M2 | HEIGHT: 66 IN

## 2024-11-26 DIAGNOSIS — Z01.818 PRE-PROCEDURAL EXAMINATION: ICD-10-CM

## 2024-11-26 DIAGNOSIS — R10.11 RUQ PAIN: Primary | ICD-10-CM

## 2024-11-26 PROCEDURE — 25000003 PHARM REV CODE 250: Performed by: SURGERY

## 2024-11-26 PROCEDURE — 71000015 HC POSTOP RECOV 1ST HR: Performed by: SURGERY

## 2024-11-26 PROCEDURE — 27201423 OPTIME MED/SURG SUP & DEVICES STERILE SUPPLY: Performed by: SURGERY

## 2024-11-26 PROCEDURE — 36000709 HC OR TIME LEV III EA ADD 15 MIN: Performed by: SURGERY

## 2024-11-26 PROCEDURE — 47562 LAPAROSCOPIC CHOLECYSTECTOMY: CPT | Mod: ,,, | Performed by: SURGERY

## 2024-11-26 PROCEDURE — D9220A PRA ANESTHESIA: Mod: CRNA,,, | Performed by: NURSE ANESTHETIST, CERTIFIED REGISTERED

## 2024-11-26 PROCEDURE — 25000003 PHARM REV CODE 250: Performed by: NURSE ANESTHETIST, CERTIFIED REGISTERED

## 2024-11-26 PROCEDURE — 36000708 HC OR TIME LEV III 1ST 15 MIN: Performed by: SURGERY

## 2024-11-26 PROCEDURE — 37000008 HC ANESTHESIA 1ST 15 MINUTES: Performed by: SURGERY

## 2024-11-26 PROCEDURE — 71000033 HC RECOVERY, INTIAL HOUR: Performed by: SURGERY

## 2024-11-26 PROCEDURE — 88304 TISSUE EXAM BY PATHOLOGIST: CPT | Mod: 26,,, | Performed by: PATHOLOGY

## 2024-11-26 PROCEDURE — 37000009 HC ANESTHESIA EA ADD 15 MINS: Performed by: SURGERY

## 2024-11-26 PROCEDURE — 25000003 PHARM REV CODE 250: Performed by: ANESTHESIOLOGY

## 2024-11-26 PROCEDURE — D9220A PRA ANESTHESIA: Mod: ANES,,, | Performed by: ANESTHESIOLOGY

## 2024-11-26 PROCEDURE — 63600175 PHARM REV CODE 636 W HCPCS: Performed by: NURSE ANESTHETIST, CERTIFIED REGISTERED

## 2024-11-26 PROCEDURE — 88304 TISSUE EXAM BY PATHOLOGIST: CPT | Mod: TC,SUR | Performed by: SURGERY

## 2024-11-26 PROCEDURE — 71000016 HC POSTOP RECOV ADDL HR: Performed by: SURGERY

## 2024-11-26 PROCEDURE — 63600175 PHARM REV CODE 636 W HCPCS: Performed by: ANESTHESIOLOGY

## 2024-11-26 RX ORDER — HYDROCODONE BITARTRATE AND ACETAMINOPHEN 7.5; 325 MG/1; MG/1
1 TABLET ORAL EVERY 6 HOURS PRN
Qty: 15 TABLET | Refills: 0 | Status: SHIPPED | OUTPATIENT
Start: 2024-11-26

## 2024-11-26 RX ORDER — ONDANSETRON HYDROCHLORIDE 2 MG/ML
INJECTION, SOLUTION INTRAVENOUS
Status: DISCONTINUED | OUTPATIENT
Start: 2024-11-26 | End: 2024-11-26

## 2024-11-26 RX ORDER — MORPHINE SULFATE 10 MG/ML
4 INJECTION INTRAMUSCULAR; INTRAVENOUS; SUBCUTANEOUS EVERY 5 MIN PRN
Status: DISCONTINUED | OUTPATIENT
Start: 2024-11-26 | End: 2024-11-26 | Stop reason: HOSPADM

## 2024-11-26 RX ORDER — ROCURONIUM BROMIDE 10 MG/ML
INJECTION, SOLUTION INTRAVENOUS
Status: DISCONTINUED | OUTPATIENT
Start: 2024-11-26 | End: 2024-11-26

## 2024-11-26 RX ORDER — SODIUM CHLORIDE 9 MG/ML
INJECTION, SOLUTION INTRAVENOUS CONTINUOUS
Status: DISCONTINUED | OUTPATIENT
Start: 2024-11-26 | End: 2024-11-26 | Stop reason: HOSPADM

## 2024-11-26 RX ORDER — HYDROMORPHONE HYDROCHLORIDE 2 MG/ML
0.5 INJECTION, SOLUTION INTRAMUSCULAR; INTRAVENOUS; SUBCUTANEOUS EVERY 5 MIN PRN
Status: COMPLETED | OUTPATIENT
Start: 2024-11-26 | End: 2024-11-26

## 2024-11-26 RX ORDER — FENTANYL CITRATE 50 UG/ML
INJECTION, SOLUTION INTRAMUSCULAR; INTRAVENOUS
Status: DISCONTINUED | OUTPATIENT
Start: 2024-11-26 | End: 2024-11-26

## 2024-11-26 RX ORDER — KETOROLAC TROMETHAMINE 30 MG/ML
30 INJECTION, SOLUTION INTRAMUSCULAR; INTRAVENOUS ONCE
Status: COMPLETED | OUTPATIENT
Start: 2024-11-26 | End: 2024-11-26

## 2024-11-26 RX ORDER — OXYCODONE HYDROCHLORIDE 5 MG/1
5 TABLET ORAL
Status: DISCONTINUED | OUTPATIENT
Start: 2024-11-26 | End: 2024-11-26 | Stop reason: HOSPADM

## 2024-11-26 RX ORDER — MIDAZOLAM HYDROCHLORIDE 1 MG/ML
INJECTION INTRAMUSCULAR; INTRAVENOUS
Status: DISCONTINUED | OUTPATIENT
Start: 2024-11-26 | End: 2024-11-26

## 2024-11-26 RX ORDER — PROPOFOL 10 MG/ML
VIAL (ML) INTRAVENOUS
Status: DISCONTINUED | OUTPATIENT
Start: 2024-11-26 | End: 2024-11-26

## 2024-11-26 RX ORDER — DIPHENHYDRAMINE HYDROCHLORIDE 50 MG/ML
25 INJECTION INTRAMUSCULAR; INTRAVENOUS EVERY 6 HOURS PRN
Status: DISCONTINUED | OUTPATIENT
Start: 2024-11-26 | End: 2024-11-26 | Stop reason: HOSPADM

## 2024-11-26 RX ORDER — DEXAMETHASONE SODIUM PHOSPHATE 4 MG/ML
INJECTION, SOLUTION INTRA-ARTICULAR; INTRALESIONAL; INTRAMUSCULAR; INTRAVENOUS; SOFT TISSUE
Status: DISCONTINUED | OUTPATIENT
Start: 2024-11-26 | End: 2024-11-26

## 2024-11-26 RX ORDER — CEFAZOLIN 2 G/1
2 INJECTION, POWDER, FOR SOLUTION INTRAMUSCULAR; INTRAVENOUS
Status: DISCONTINUED | OUTPATIENT
Start: 2024-11-26 | End: 2024-11-26 | Stop reason: HOSPADM

## 2024-11-26 RX ORDER — LIDOCAINE HYDROCHLORIDE 20 MG/ML
INJECTION, SOLUTION EPIDURAL; INFILTRATION; INTRACAUDAL; PERINEURAL
Status: DISCONTINUED | OUTPATIENT
Start: 2024-11-26 | End: 2024-11-26

## 2024-11-26 RX ORDER — CEFAZOLIN SODIUM 1 G/3ML
INJECTION, POWDER, FOR SOLUTION INTRAMUSCULAR; INTRAVENOUS
Status: DISCONTINUED | OUTPATIENT
Start: 2024-11-26 | End: 2024-11-26

## 2024-11-26 RX ORDER — IPRATROPIUM BROMIDE AND ALBUTEROL SULFATE 2.5; .5 MG/3ML; MG/3ML
3 SOLUTION RESPIRATORY (INHALATION)
Status: DISCONTINUED | OUTPATIENT
Start: 2024-11-26 | End: 2024-11-26 | Stop reason: HOSPADM

## 2024-11-26 RX ORDER — SODIUM CHLORIDE, SODIUM LACTATE, POTASSIUM CHLORIDE, CALCIUM CHLORIDE 600; 310; 30; 20 MG/100ML; MG/100ML; MG/100ML; MG/100ML
125 INJECTION, SOLUTION INTRAVENOUS CONTINUOUS
Status: DISCONTINUED | OUTPATIENT
Start: 2024-11-26 | End: 2024-11-26 | Stop reason: HOSPADM

## 2024-11-26 RX ORDER — ACETAMINOPHEN 10 MG/ML
1000 INJECTION, SOLUTION INTRAVENOUS ONCE
Status: COMPLETED | OUTPATIENT
Start: 2024-11-26 | End: 2024-11-26

## 2024-11-26 RX ORDER — LIDOCAINE HYDROCHLORIDE 40 MG/ML
SOLUTION TOPICAL
Status: DISCONTINUED | OUTPATIENT
Start: 2024-11-26 | End: 2024-11-26

## 2024-11-26 RX ORDER — MEPERIDINE HYDROCHLORIDE 25 MG/ML
25 INJECTION INTRAMUSCULAR; INTRAVENOUS; SUBCUTANEOUS EVERY 10 MIN PRN
Status: DISCONTINUED | OUTPATIENT
Start: 2024-11-26 | End: 2024-11-26 | Stop reason: HOSPADM

## 2024-11-26 RX ORDER — ONDANSETRON HYDROCHLORIDE 2 MG/ML
4 INJECTION, SOLUTION INTRAVENOUS DAILY PRN
Status: DISCONTINUED | OUTPATIENT
Start: 2024-11-26 | End: 2024-11-26 | Stop reason: HOSPADM

## 2024-11-26 RX ADMIN — PROPOFOL 150 MG: 10 INJECTION, EMULSION INTRAVENOUS at 12:11

## 2024-11-26 RX ADMIN — HYDROMORPHONE HYDROCHLORIDE 0.5 MG: 2 INJECTION INTRAMUSCULAR; INTRAVENOUS; SUBCUTANEOUS at 01:11

## 2024-11-26 RX ADMIN — ROCURONIUM BROMIDE 40 MG: 10 INJECTION, SOLUTION INTRAVENOUS at 12:11

## 2024-11-26 RX ADMIN — DEXAMETHASONE SODIUM PHOSPHATE 8 MG: 4 INJECTION, SOLUTION INTRA-ARTICULAR; INTRALESIONAL; INTRAMUSCULAR; INTRAVENOUS; SOFT TISSUE at 12:11

## 2024-11-26 RX ADMIN — ONDANSETRON 4 MG: 2 INJECTION INTRAMUSCULAR; INTRAVENOUS at 12:11

## 2024-11-26 RX ADMIN — LIDOCAINE HYDROCHLORIDE 80 MG: 20 INJECTION, SOLUTION EPIDURAL; INFILTRATION; INTRACAUDAL; PERINEURAL at 12:11

## 2024-11-26 RX ADMIN — LIDOCAINE HYDROCHLORIDE 120 MG: 40 SOLUTION TOPICAL at 12:11

## 2024-11-26 RX ADMIN — MIDAZOLAM HYDROCHLORIDE 2 MG: 1 INJECTION, SOLUTION INTRAMUSCULAR; INTRAVENOUS at 12:11

## 2024-11-26 RX ADMIN — ONDANSETRON 4 MG: 2 INJECTION INTRAMUSCULAR; INTRAVENOUS at 02:11

## 2024-11-26 RX ADMIN — CEFAZOLIN 2 G: 1 INJECTION, POWDER, FOR SOLUTION INTRAMUSCULAR; INTRAVENOUS; PARENTERAL at 12:11

## 2024-11-26 RX ADMIN — SODIUM CHLORIDE: 9 INJECTION, SOLUTION INTRAVENOUS at 02:11

## 2024-11-26 RX ADMIN — FENTANYL CITRATE 100 MCG: 50 INJECTION, SOLUTION INTRAMUSCULAR; INTRAVENOUS at 12:11

## 2024-11-26 RX ADMIN — ACETAMINOPHEN 1000 MG: 10 INJECTION, SOLUTION INTRAVENOUS at 01:11

## 2024-11-26 RX ADMIN — SUGAMMADEX 200 MG: 100 INJECTION, SOLUTION INTRAVENOUS at 01:11

## 2024-11-26 RX ADMIN — KETOROLAC TROMETHAMINE 30 MG: 30 INJECTION, SOLUTION INTRAMUSCULAR at 01:11

## 2024-11-26 RX ADMIN — OXYCODONE 5 MG: 5 TABLET ORAL at 02:11

## 2024-11-26 NOTE — OR NURSING
1317 REC'D TO PACU IN STABLE CONDITION. VSS. SATS 96% ON RA. WILL  C/O ABD PAIN RATED 7/10 ON PAIN SCALE. WILL ADM PRN PAIN MEDS & F/U. ABD DSG x4 C/D/I. WILL CONT TO MONITOR.    1335 UPDATE ON PT STATUS GIVEN TO FAMILY VIA TEXT MESSAGE.    1440 PAIN UNRELIEVED BY PRN PAIN MEDS. NOTIFIED DR CHOWDHURY. ORDERS REC'D.     1415 RETURNED TO [facility] AWAKE IN STABLE CONDITION. REPORT GIVEN TO AMINA GUEVARA RN. /80 HR 66 RR 16 SATS 98% ON RA. FAMILY AT BEDSIDE.

## 2024-11-26 NOTE — ANESTHESIA PROCEDURE NOTES
Intubation    Date/Time: 11/26/2024 12:34 PM    Performed by: Adam Arnett CRNA  Authorized by: Dimas Watson MD    Intubation:     Induction:  Intravenous    Intubated:  Postinduction    Mask Ventilation:  Easy mask    Attempts:  1    Attempted By:  CRNA    Method of Intubation:  Direct    Blade:  Sabrina 4    Difficult Airway Encountered?: No      Complications:  None    Airway Device:  Oral endotracheal tube    Airway Device Size:  7.0    Style/Cuff Inflation:  Cuffed    Inflation Amount (mL):  8    Tube secured:  20    Secured at:  The lips    Placement Verified By:  Capnometry    Complicating Factors:  None    Findings Post-Intubation:  BS equal bilateral and atraumatic/condition of teeth unchanged

## 2024-11-26 NOTE — OP NOTE
[facility]  Surgery Department  Operative Note    SUMMARY     Date of Procedure: 11/26/2024     Procedure: Procedure(s) (LRB):  CHOLECYSTECTOMY, LAPAROSCOPIC (N/A)     Surgeons and Role:     * Vicente Rivera MD - Primary    Assisting Surgeon: None    Pre-Operative Diagnosis: RUQ pain [R10.11]    Post-Operative Diagnosis: Post-Op Diagnosis Codes:     * RUQ pain [R10.11]    Anesthesia: General    Procedures Performed: laparoscopic cholecystectomy    Significant Findings of the Procedure: mild chronic cholecystitis    Procedure in Detail: After informed consent was obtained patient was brought to the OR and prepped and draped in the usual sterile fashion. We started off by optically inserting a 5 mm trocar at the umbilical area. Pneumoperitoneum was obtained up to 15 cm of mercury of pressure. We then under direct visualization placed an additional 11 mm port in the subxiphoid area and 2 additional 5 mm ports in the right upper quadrant. We then retracted the gallbladder over the liver and began dissecting out the cystic structures. The cystic duct and the artery were the only 2 structures entering the gallbladder and these were clipped twice distally once proximally and cut between. We then used the hook cautery to remove the gallbladder from the gallbladder fossa. No holes were made in the gallbladder nor the liver. We then used an Endo Catch bag to remove the gallbladder. We then inspected our bed and clips and they were all intact and hemostatic. We watched our ports come out.  We then discontinued pneumoperitoneum. The skin was closed with a 4-0 Monocryl in a subcuticular manner and patient tolerated the procedure well.      Complications: No    Estimated Blood Loss (EBL): 10 cc           Implants: * No implants in log *    Specimens:   Specimen (24h ago, onward)       Start     Ordered    11/26/24 2353  Surgical Pathology  RELEASE UPON ORDERING         11/26/24 3270                             Condition: Good    Disposition: PACU - hemodynamically stable.    Attestation: I was present and scrubbed for the entire procedure.

## 2024-11-26 NOTE — ANESTHESIA PREPROCEDURE EVALUATION
11/26/2024  Josefina Angelo is a 49 y.o., female.      Pre-op Assessment    I have reviewed the Patient Summary Reports.     I have reviewed the Nursing Notes. I have reviewed the NPO Status.   I have reviewed the Medications.     Review of Systems  Anesthesia Hx:  No problems with previous Anesthesia                Social:  Non-Smoker, No Alcohol Use       Hematology/Oncology:    Oncology Normal    -- Anemia:                                  EENT/Dental:  EENT/Dental Normal           Cardiovascular:     Hypertension                                          Pulmonary:  Pulmonary Normal                       Renal/:  Renal/ Normal                 Hepatic/GI:  Hepatic/GI Normal                    Musculoskeletal:  Musculoskeletal Normal                Neurological:  Neurology Normal                                      Endocrine:  Endocrine Normal            Dermatological:  Skin Normal    Psych:  Psychiatric Normal                    Physical Exam  General: Well nourished    Airway:  Mallampati: II / II  Mouth Opening: Normal  TM Distance: > 6 cm  Tongue: Normal  Neck ROM: Normal ROM    Chest/Lungs:  Clear to auscultation, Normal Respiratory Rate    Heart:  Rate: Normal  Rhythm: Regular Rhythm        Anesthesia Plan  Type of Anesthesia, risks & benefits discussed:    Anesthesia Type: Gen ETT  Intra-op Monitoring Plan: Standard ASA Monitors  Post Op Pain Control Plan: multimodal analgesia  Induction:  IV  Informed Consent: Informed consent signed with the Patient and all parties understand the risks and agree with anesthesia plan.  All questions answered. Patient consented to blood products? Yes  ASA Score: 2  Day of Surgery Review of History & Physical: H&P Update referred to the surgeon/provider.I have interviewed and examined the patient. I have reviewed the patient's H&P dated:     Ready For Surgery From  Anesthesia Perspective.     .

## 2024-11-26 NOTE — DISCHARGE SUMMARY
[facility]  Discharge Note  Short Stay    Procedure(s) (LRB):  CHOLECYSTECTOMY, LAPAROSCOPIC (N/A)      OUTCOME: Patient tolerated treatment/procedure well without complication and is now ready for discharge.    DISPOSITION: Home or Self Care    FINAL DIAGNOSIS: chronic cholecystitis      FOLLOWUP: In clinic    DISCHARGE INSTRUCTIONS:    Discharge Procedure Orders   Diet Adult Regular     Remove dressing in 24 hours     Notify your health care provider if you experience any of the following:  temperature >100.4     Notify your health care provider if you experience any of the following:  persistent nausea and vomiting or diarrhea     Notify your health care provider if you experience any of the following:  severe uncontrolled pain     Notify your health care provider if you experience any of the following:  redness, tenderness, or signs of infection (pain, swelling, redness, odor or green/yellow discharge around incision site)     Notify your health care provider if you experience any of the following:  difficulty breathing or increased cough     Notify your health care provider if you experience any of the following:  severe persistent headache     Notify your health care provider if you experience any of the following:  worsening rash     Notify your health care provider if you experience any of the following:  persistent dizziness, light-headedness, or visual disturbances     Notify your health care provider if you experience any of the following:  increased confusion or weakness     Notify your health care provider if you experience any of the following:     Shower on day dressing removed (No bath)        TIME SPENT ON DISCHARGE: 5 minutes

## 2024-11-26 NOTE — TRANSFER OF CARE
"Anesthesia Transfer of Care Note    Patient: Josefina Angelo    Procedure(s) Performed: Procedure(s) (LRB):  CHOLECYSTECTOMY, LAPAROSCOPIC (N/A)    Patient location: GI    Anesthesia Type: general    Transport from OR: Transported from OR on room air with adequate spontaneous ventilation    Post pain: adequate analgesia    Post assessment: no apparent anesthetic complications and tolerated procedure well    Post vital signs: stable    Level of consciousness: responds to stimulation and awake    Nausea/Vomiting: no nausea/vomiting    Complications: none    Transfer of care protocol was followed    Last vitals: Visit Vitals  /67   Pulse (!) 56   Temp 36.7 °C (98 °F) (Oral)   Resp 10   Ht 5' 6" (1.676 m)   Wt 70.3 kg (155 lb)   LMP 11/22/2021   SpO2 99%   Breastfeeding No   BMI 25.02 kg/m²     "

## 2024-11-27 NOTE — ANESTHESIA POSTPROCEDURE EVALUATION
Anesthesia Post Evaluation    Patient: Josefina Angelo    Procedure(s) Performed: Procedure(s) (LRB):  CHOLECYSTECTOMY, LAPAROSCOPIC (N/A)    Final Anesthesia Type: general      Patient location during evaluation: PACU  Patient participation: Yes- Able to Participate  Level of consciousness: awake and sedated  Post-procedure vital signs: reviewed and stable  Pain management: adequate  Airway patency: patent    PONV status at discharge: No PONV  Anesthetic complications: no      Cardiovascular status: blood pressure returned to baseline  Respiratory status: unassisted  Hydration status: euvolemic  Follow-up not needed.              Vitals Value Taken Time   /73 11/26/24 1546   Temp 36.7 °C (98 °F) 11/26/24 1321   Pulse 72 11/26/24 1552   Resp 16 11/26/24 1545   SpO2 97 % 11/26/24 1552   Vitals shown include unfiled device data.      Event Time   Out of Recovery 14:05:00         Pain/Soniya Score: Pain Rating Prior to Med Admin: 7 (11/26/2024  2:40 PM)  Pain Rating Post Med Admin: 4 (11/26/2024  2:05 PM)  Soniya Score: 10 (11/26/2024  2:15 PM)  Modified Soniya Score: 19 (11/26/2024  4:10 PM)

## 2024-12-05 ENCOUNTER — OFFICE VISIT (OUTPATIENT)
Dept: FAMILY MEDICINE | Facility: CLINIC | Age: 49
End: 2024-12-05
Payer: COMMERCIAL

## 2024-12-05 VITALS
HEART RATE: 60 BPM | RESPIRATION RATE: 20 BRPM | BODY MASS INDEX: 25.07 KG/M2 | TEMPERATURE: 98 F | WEIGHT: 156 LBS | HEIGHT: 66 IN | SYSTOLIC BLOOD PRESSURE: 124 MMHG | DIASTOLIC BLOOD PRESSURE: 78 MMHG | OXYGEN SATURATION: 100 %

## 2024-12-05 DIAGNOSIS — Z00.00 ROUTINE GENERAL MEDICAL EXAMINATION AT A HEALTH CARE FACILITY: Primary | ICD-10-CM

## 2024-12-05 DIAGNOSIS — Z13.220 SCREENING FOR LIPOID DISORDERS: ICD-10-CM

## 2024-12-05 DIAGNOSIS — Z11.59 NEED FOR HEPATITIS C SCREENING TEST: ICD-10-CM

## 2024-12-05 DIAGNOSIS — Z12.39 ENCOUNTER FOR SCREENING FOR MALIGNANT NEOPLASM OF BREAST, UNSPECIFIED SCREENING MODALITY: ICD-10-CM

## 2024-12-05 DIAGNOSIS — Z11.4 SCREENING FOR HIV (HUMAN IMMUNODEFICIENCY VIRUS): ICD-10-CM

## 2024-12-05 DIAGNOSIS — Z13.1 SCREENING FOR DIABETES MELLITUS: ICD-10-CM

## 2024-12-05 DIAGNOSIS — Z23 IMMUNIZATION DUE: ICD-10-CM

## 2024-12-05 LAB
CHOLEST SERPL-MCNC: 172 MG/DL
CHOLEST/HDLC SERPL: 3.3 {RATIO}
EST. AVERAGE GLUCOSE BLD GHB EST-MCNC: 103 MG/DL
HBA1C MFR BLD HPLC: 5.2 %
HCV AB SER QL: NORMAL
HDLC SERPL-MCNC: 52 MG/DL (ref 35–60)
HIV 1+O+2 AB SERPL QL: NORMAL
LDLC SERPL CALC-MCNC: 105 MG/DL
LDLC/HDLC SERPL: 2 {RATIO}
NONHDLC SERPL-MCNC: 120 MG/DL
TRIGL SERPL-MCNC: 76 MG/DL (ref 37–140)
VLDLC SERPL-MCNC: 15 MG/DL

## 2024-12-05 PROCEDURE — 3074F SYST BP LT 130 MM HG: CPT | Mod: ,,, | Performed by: NURSE PRACTITIONER

## 2024-12-05 PROCEDURE — 3078F DIAST BP <80 MM HG: CPT | Mod: ,,, | Performed by: NURSE PRACTITIONER

## 2024-12-05 PROCEDURE — 1159F MED LIST DOCD IN RCRD: CPT | Mod: ,,, | Performed by: NURSE PRACTITIONER

## 2024-12-05 PROCEDURE — 86803 HEPATITIS C AB TEST: CPT | Mod: ,,, | Performed by: CLINICAL MEDICAL LABORATORY

## 2024-12-05 PROCEDURE — 90715 TDAP VACCINE 7 YRS/> IM: CPT | Mod: ,,, | Performed by: NURSE PRACTITIONER

## 2024-12-05 PROCEDURE — 80061 LIPID PANEL: CPT | Mod: ,,, | Performed by: CLINICAL MEDICAL LABORATORY

## 2024-12-05 PROCEDURE — 99396 PREV VISIT EST AGE 40-64: CPT | Mod: 25,,, | Performed by: NURSE PRACTITIONER

## 2024-12-05 PROCEDURE — 4010F ACE/ARB THERAPY RXD/TAKEN: CPT | Mod: ,,, | Performed by: NURSE PRACTITIONER

## 2024-12-05 PROCEDURE — 87389 HIV-1 AG W/HIV-1&-2 AB AG IA: CPT | Mod: ,,, | Performed by: CLINICAL MEDICAL LABORATORY

## 2024-12-05 PROCEDURE — 1160F RVW MEDS BY RX/DR IN RCRD: CPT | Mod: ,,, | Performed by: NURSE PRACTITIONER

## 2024-12-05 PROCEDURE — 90472 IMMUNIZATION ADMIN EACH ADD: CPT | Mod: ,,, | Performed by: NURSE PRACTITIONER

## 2024-12-05 PROCEDURE — 90750 HZV VACC RECOMBINANT IM: CPT | Mod: ,,, | Performed by: NURSE PRACTITIONER

## 2024-12-05 PROCEDURE — 3008F BODY MASS INDEX DOCD: CPT | Mod: ,,, | Performed by: NURSE PRACTITIONER

## 2024-12-05 PROCEDURE — 83036 HEMOGLOBIN GLYCOSYLATED A1C: CPT | Mod: ,,, | Performed by: CLINICAL MEDICAL LABORATORY

## 2024-12-05 PROCEDURE — 90471 IMMUNIZATION ADMIN: CPT | Mod: ,,, | Performed by: NURSE PRACTITIONER

## 2024-12-05 NOTE — Clinical Note
Patient a cscope at [facility] in 2023 could you please try and locate record. Thanks remy

## 2024-12-05 NOTE — PROGRESS NOTES
Subjective     Patient ID: Josefina Angelo is a 49 y.o. female.    Chief Complaint: Healthy You    HPI    Josefina Angelo is a 49 y.o. female who presents for routine wellness visit. Pt states she is doing well with no acute complaints. Pt denies  FHx of breast, colon. Mother with cervical and lung cancer.  Last colonoscopy was 4/12/2022, done in [facility] 2023 following with Dr Beyer now.  Last mammogram was never ordered. Had hysterectomy left one ovary. Dr Jackson. Followed with Dr Simpson.  Nonsmoker. Working on improving diet and exercise. Decline flu shot.       Review of Systems   Constitutional: Negative.    HENT: Negative.     Eyes: Negative.    Respiratory: Negative.     Cardiovascular: Negative.    Gastrointestinal: Negative.    Endocrine: Negative.    Genitourinary: Negative.    Musculoskeletal: Negative.    Integumentary:  Negative.   Allergic/Immunologic: Negative.    Neurological: Negative.    Hematological: Negative.    Psychiatric/Behavioral: Negative.         Tobacco Use: Low Risk  (12/5/2024)    Patient History     Smoking Tobacco Use: Never     Smokeless Tobacco Use: Never     Passive Exposure: Never     Review of patient's allergies indicates:  No Known Allergies  Current Outpatient Medications   Medication Instructions    HYDROcodone-acetaminophen (NORCO) 7.5-325 mg per tablet 1 tablet, Oral, Every 6 hours PRN    lisinopriL (PRINIVIL,ZESTRIL) 20 mg, Oral, 2 times daily    omeprazole (PRILOSEC) 40 mg, Oral, Daily    plecanatide (TRULANCE) 3 mg, Oral, Daily     Medications Discontinued During This Encounter   Medication Reason    LINZESS 145 mcg Cap capsule Patient no longer taking    phentermine (ADIPEX-P) 37.5 mg tablet Patient no longer taking       Past Medical History:   Diagnosis Date    H/O atrioventricular jocy ablation     Hypertension     Paroxysmal A-fib     Small bowel obstruction 01/04/2022     Health Maintenance Topics with due status: Not Due       Topic Last Completion Date     "Colorectal Cancer Screening 04/12/2022    RSV Vaccine (Age 60+ and Pregnant patients) Not Due     Immunization History   Administered Date(s) Administered    COVID-19, MRNA, LN-S, PF (MODERNA FULL 0.5 ML DOSE) 08/02/2021, 08/30/2021       Objective     Body mass index is 25.18 kg/m².  Wt Readings from Last 3 Encounters:   12/05/24 70.8 kg (156 lb)   11/26/24 70.3 kg (155 lb)   11/25/24 70.3 kg (155 lb)     Ht Readings from Last 3 Encounters:   12/05/24 5' 6" (1.676 m)   11/26/24 5' 6" (1.676 m)   11/25/24 5' 6" (1.676 m)     BP Readings from Last 3 Encounters:   12/05/24 124/78   11/26/24 121/73   10/16/24 (!) 149/89     Temp Readings from Last 3 Encounters:   12/05/24 98.3 °F (36.8 °C) (Oral)   11/26/24 98 °F (36.7 °C) (Oral)   10/16/24 97.5 °F (36.4 °C)     Pulse Readings from Last 3 Encounters:   12/05/24 60   11/26/24 78   10/16/24 (!) 57     Resp Readings from Last 3 Encounters:   12/05/24 20   11/26/24 16   10/16/24 12     PF Readings from Last 3 Encounters:   No data found for PF       Physical Exam  Vitals reviewed.   Constitutional:       Appearance: Normal appearance.   HENT:      Head: Normocephalic.      Right Ear: External ear normal.      Left Ear: External ear normal.      Nose: Nose normal.      Mouth/Throat:      Mouth: Mucous membranes are moist.   Eyes:      Extraocular Movements: Extraocular movements intact.   Cardiovascular:      Rate and Rhythm: Normal rate and regular rhythm.      Pulses: Normal pulses.      Heart sounds: Normal heart sounds.   Pulmonary:      Effort: Pulmonary effort is normal. No respiratory distress.      Breath sounds: Normal breath sounds. No stridor. No wheezing, rhonchi or rales.   Chest:      Chest wall: No tenderness.   Abdominal:      Palpations: Abdomen is soft.   Musculoskeletal:         General: Normal range of motion.      Cervical back: Normal range of motion.   Skin:     General: Skin is warm and dry.      Capillary Refill: Capillary refill takes less than 2 " seconds.   Neurological:      General: No focal deficit present.      Mental Status: She is alert and oriented to person, place, and time.   Psychiatric:         Mood and Affect: Mood normal.         Behavior: Behavior normal.         Thought Content: Thought content normal.         Judgment: Judgment normal.         Assessment and Plan     Problem List Items Addressed This Visit    None  Visit Diagnoses       Routine general medical examination at a health care facility    -  Primary    Relevant Medications    Tdap (BOOSTRIX) vaccine injection 0.5 mL (Start on 12/5/2024 10:30 AM)    varicella zoster (Shingrix) IM vaccine (>/= 51 yo) (Start on 12/5/2024 11:15 AM)    Other Relevant Orders    Lipid Panel    HIV 1/2 Ag/Ab (4th Gen)    Hepatitis C Antibody    Hemoglobin A1C    Screening for diabetes mellitus        CMP recently obtained 11/25/24 glucose stable    Relevant Orders    Hemoglobin A1C    Screening for lipoid disorders        Relevant Orders    Lipid Panel    Screening for HIV (human immunodeficiency virus)        Relevant Orders    HIV 1/2 Ag/Ab (4th Gen)    Need for hepatitis C screening test        Relevant Orders    Hepatitis C Antibody    Immunization due        Relevant Medications    Tdap (BOOSTRIX) vaccine injection 0.5 mL (Start on 12/5/2024 10:30 AM)    varicella zoster (Shingrix) IM vaccine (>/= 51 yo) (Start on 12/5/2024 11:15 AM)    BMI 25.0-25.9,adult        Relevant Orders    Hemoglobin A1C              Plan: schedule mammogram      I have reviewed the medications, allergies, and problem list.     Goal Actions:    What type of visit is the patient here for today?: Healthy You  Does the patient consent to enroll in Color Me Healthy?: No  Is this a Wellness Follow Up?: No  What is your overall wellness goal? (select at least one): Improve overall health  Choose 3: Exercise, Lifestyle, Nutrition  Lifestyle Actions : Obtain yearly mammogram, Develop good support system  Nurtrition Actions: drink  8-10 glasses of water per day, Eat a well-balanced diet  Exercise Actions: Recommend physical activity 30 minutes per day 3-5 times/week      Total time spent face-to-face and non-face-to-face coordinating care for this encounter was: >30 min    Chronic conditions status updated as per HPI.  Other than changes above, cont current medications and maintain follow up with specialists.  Return to clinic 1 year next wellness.    Maggie GONZALES

## 2024-12-06 ENCOUNTER — PATIENT OUTREACH (OUTPATIENT)
Facility: HOSPITAL | Age: 49
End: 2024-12-06
Payer: COMMERCIAL

## 2024-12-06 NOTE — PROGRESS NOTES
Population Health Chart Review & Patient Outreach Details    Updates Requested / Reviewed:  [x]  Care Team Updated  [x]  Care Everywhere Updated & Reviewed  [x]   Reviewed    Health Maintenance Topics Addressed and Outreach Outcomes / Actions Taken:  Colon Cancer Screening [x]  Updated with June 2023 Colonoscopy (Dr. Mclean) abstracted from Care Everywhere. History Updated.

## 2024-12-06 NOTE — PROGRESS NOTES
Population Health Chart Review & Patient Outreach Details      Further Action Needed If Patient Returns Outreach:            Updates Requested / Reviewed:     []  Care Everywhere    []     []  External Sources ([facility], [facility], [facility], etc.)    [] [facility]   [] [facility]   [] Other:    []  Care Team Updated   []  Removed  or Duplicate Orders   []  Immunization Reconciliation Completed / Queried    [] [place]   [] [place]   [] [place]   [] [place]      Health Maintenance Topics Addressed and Outreach Outcomes / Actions Taken:             Breast Cancer Screening []  Mammogram Order Placed    []  Mammogram Screening Scheduled    []  External Records Requested & Care Team Updated if Applicable    []  External Records Uploaded & Care Team Updated if Applicable    []  Pt Declined Scheduling Mammogram    []  Pt Will Schedule with External Provider / Order Routed & Care Team Updated if Applicable              Cervical Cancer Screening []  Pap Smear Scheduled in Primary Care or OBGYN    []  External Records Requested & Care Team Updated if Applicable       []  External Records Uploaded, Care Team Updated, & History Updated if Applicable    []  Patient Declined Scheduling Pap Smear    []  Patient Will Schedule with External Provider & Care Team Updated if Applicable                  Colorectal Cancer Screening []  Colonoscopy Case Request / Referral / Home Test Order Placed    []  External Records Requested & Care Team Updated if Applicable    []  External Records Uploaded, Care Team Updated, & History Updated if Applicable    []  Patient Declined Completing Colon Cancer Screening    []  Patient Will Schedule with External Provider & Care Team Updated if Applicable    []  Fit Kit Mailed (add the SmartPhrase under additional notes)    []  Reminded Patient to Complete Home Test                Diabetic Eye Exam []  Eye Exam Screening Order Placed    []  Eye Camera Scheduled or Optometry/Ophthalmology Referral  Placed    []  External Records Requested & Care Team Updated if Applicable    []  External Records Uploaded, Care Team Updated, & History Updated if Applicable    []  Patient Declined Scheduling Eye Exam    []  Patient Will Schedule with External Provider & Care Team Updated if Applicable             Blood Pressure Control []  Primary Care Follow Up Visit Scheduled     []  Remote Blood Pressure Reading Captured    []  Patient Declined Remote Reading or Scheduling Appt - Escalated to PCP    []  Patient Will Call Back or Send Portal Message with Reading                 HbA1c & Other Labs []  Overdue Lab(s) Ordered    []  Overdue Lab(s) Scheduled    []  External Records Uploaded & Care Team Updated if Applicable    []  Primary Care Follow Up Visit Scheduled     []  Reminded Patient to Complete A1c Home Test    []  Patient Declined Scheduling Labs or Will Call Back to Schedule    []  Patient Will Schedule with External Provider / Order Routed, & Care Team Updated if Applicable           Primary Care Appointment []  Primary Care Appt Scheduled    []  Patient Declined Scheduling or Will Call Back to Schedule    []  Pt Established with External Provider, Updated Care Team, & Informed Pt to Notify Payor if Applicable           Medication Adherence /    Statin Use []  Primary Care Appointment Scheduled    []  Patient Reminded to  Prescription    []  Patient Declined, Provider Notified if Needed    []  Sent Provider Message to Review to Evaluate Pt for Statin, Add Exclusion Dx Codes, Document   Exclusion in Problem List, Change Statin Intensity Level to Moderate or High Intensity if Applicable                Osteoporosis Screening []  Dexa Order Placed    []  Dexa Appointment Scheduled    []  External Records Requested & Care Team Updated    []  External Records Uploaded, Care Team Updated, & History Updated if Applicable    []  Patient Declined Scheduling Dexa or Will Call Back to Schedule    []  Patient Will Schedule  with External Provider / Order Routed & Care Team Updated if Applicable       Additional Notes:.  Post visit Population Health review of encounter with date of service  12/5/24 with Flynn.  All required HY components in encounter. Provider did A1c instead of glucose.   Followup appt for: 12/9/25 HY

## 2024-12-12 ENCOUNTER — OFFICE VISIT (OUTPATIENT)
Dept: SURGERY | Facility: CLINIC | Age: 49
End: 2024-12-12
Attending: SURGERY
Payer: COMMERCIAL

## 2024-12-12 DIAGNOSIS — Z90.49 S/P LAPAROSCOPIC CHOLECYSTECTOMY: Primary | ICD-10-CM

## 2024-12-12 PROCEDURE — 99999 PR PBB SHADOW E&M-EST. PATIENT-LVL III: CPT | Mod: PBBFAC,,, | Performed by: NURSE PRACTITIONER

## 2024-12-13 NOTE — PROGRESS NOTES
Post-operative Note    HPI:  The patient is status post laparoscopic cholecystectomy.  Doing well from a gallbladder standpoint, but reports constipation.  Just resumed Linzess yesterday.  Also attempted enema at home with small bowel movement.  Constipation was in issue perioperatively, exacerbated after discontinuing Linzess, anesthesia, and narcotics.    PHYSICAL EXAM:    Abdomen soft, nontender, nondistended.  Laparoscopic incisions well approximated without erythema or drainage.     Recent Results (from the past 3 weeks)   EKG 12-lead    Collection Time: 11/25/24  2:00 PM   Result Value Ref Range    QRS Duration 86 ms    [facility] QTC Calculation 392 ms   Comprehensive Metabolic Panel    Collection Time: 11/25/24  2:09 PM   Result Value Ref Range    Sodium 140 136 - 145 mmol/L    Potassium 4.3 3.5 - 5.1 mmol/L    Chloride 108 (H) 98 - 107 mmol/L    CO2 27 22 - 29 mmol/L    Anion Gap 9 7 - 16 mmol/L    Glucose 81 74 - 100 mg/dL    BUN 10 10 - 20 mg/dL    Creatinine 0.74 0.55 - 1.02 mg/dL    BUN/Creatinine Ratio 14 6 - 20    Calcium 9.2 8.4 - 10.2 mg/dL    Total Protein 6.9 6.4 - 8.3 g/dL    Albumin 3.9 3.5 - 5.0 g/dL    Globulin 3.0 2.0 - 4.0 g/dL    A/G Ratio 1.3     Bilirubin, Total 0.4 <=1.5 mg/dL    Alk Phos 53 40 - 150 U/L    ALT 15 <=55 U/L    AST 26 5 - 34 U/L    eGFR 99 >=60 mL/min/1.73m2   CBC with Differential    Collection Time: 11/25/24  2:09 PM   Result Value Ref Range    WBC 7.13 4.50 - 11.00 K/uL    RBC 4.72 4.20 - 5.40 M/uL    Hemoglobin 14.3 12.0 - 16.0 g/dL    Hematocrit 42.9 38.0 - 47.0 %    MCV 90.9 80.0 - 96.0 fL    MCH 30.3 27.0 - 31.0 pg    MCHC 33.3 32.0 - 36.0 g/dL    RDW 12.6 11.5 - 14.5 %    Platelet Count 226 150 - 400 K/uL    MPV 11.2 9.4 - 12.4 fL    Neutrophils % 62.0 53.0 - 65.0 %    Lymphocytes % 30.7 27.0 - 41.0 %    Monocytes % 5.8 2.0 - 6.0 %    Eosinophils % 1.1 1.0 - 4.0 %    Basophils % 0.3 0.0 -  1.0 %    Immature Granulocytes % 0.1 0.0 - 0.4 %    nRBC, Auto 0.0 <=0.0 %    Neutrophils, Abs 4.42 1.80 - 7.70 K/uL    Lymphocytes, Absolute 2.19 1.00 - 4.80 K/uL    Monocytes, Absolute 0.41 0.00 - 0.80 K/uL    Eosinophils, Absolute 0.08 0.00 - 0.50 K/uL    Basophils, Absolute 0.02 0.00 - 0.20 K/uL    Immature Granulocytes, Absolute 0.01 0.00 - 0.04 K/uL    nRBC, Absolute 0.00 <=0.00 x10e3/uL    Diff Type Auto    Surgical Pathology    Collection Time: 11/26/24 12:53 PM   Result Value Ref Range    Case Report       Surgical Pathology                                Case: [id number]                                   Authorizing Provider:  Vicente Rivera MD       Collected:           11/26/2024 12:53 PM          Ordering Location:     [facility] -     Received:            11/26/2024 01:10 PM                                 [facility]                                                              Pathologist:           Haim Chicas MD                                                           Specimen:    Gallbladder                                                                                Final Diagnosis       A. Gallbladder, cholecystectomy:  Chronic cholecystitis and cholesterolosis/cholesterol polyp formation      Gross Description       A. Gallbladder:   The specimen is received in formalin as gallbladder and consists of a gallbladder measuring 7.9 x 3.6 cm.  The serosal surface is blue-gray and congested.  The attached portion of cystic duct measures 1 x 0.5 cm and is occluded by a metal clip.  There are no stones within the container or within the gallbladder.  The wall measures up to 0.3 cm in thickness.  The mucosal surface is brown and velvety with heavy yellow stippling.  No other lesions are seen.  Representative sections are submitted in block A1.    Grossing was completed by Patrick Lawson.      Microscopic Description       A microscopic examination was performed and the diagnosis  reflects the findings.          Laboratory Notes       If this report includes immunohistochemical (IHC) test results, please note the following: IHC studies were interpreted in conjunction with appropriate positive and negative controls which demonstrate the expected positive and negative reactivity. This laboratory is regulated under CLIA as qualified to perform high-complexity testing. IHC tests are used for clinical purposes. They should not be regarded as investigational or research.       Lipid Panel    Collection Time: 12/05/24 10:53 AM   Result Value Ref Range    Triglycerides 76 37 - 140 mg/dL    Cholesterol 172 <=200 mg/dL    HDL Cholesterol 52 35 - 60 mg/dL    Cholesterol/HDL Ratio (Risk Factor) 3.3     Non- mg/dL    LDL Calculated 105 mg/dL    LDL/HDL 2.0     VLDL 15 mg/dL   HIV 1/2 Ag/Ab (4th Gen)    Collection Time: 12/05/24 10:53 AM   Result Value Ref Range    HIV 1/2 Non-Reactive Non-Reactive   Hepatitis C Antibody    Collection Time: 12/05/24 10:53 AM   Result Value Ref Range    Hepatitis C Ab Non-Reactive Non-Reactive   Hemoglobin A1C    Collection Time: 12/05/24 10:53 AM   Result Value Ref Range    Hemoglobin A1C 5.2 <=7.0 %    Estimated Average Glucose 103 mg/dL        ASSESSMENT:      The patient is doing well after surgery, with the exception of constipation.  Advised Mag citrate today.  Can repeat in the a.m..  If no results by 10:00 a.m., advised to return call.       PLAN:      Follow up  if symptoms persist otherwise as needed .

## 2025-01-16 ENCOUNTER — OFFICE VISIT (OUTPATIENT)
Dept: GASTROENTEROLOGY | Facility: CLINIC | Age: 50
End: 2025-01-16
Payer: COMMERCIAL

## 2025-01-16 VITALS
DIASTOLIC BLOOD PRESSURE: 89 MMHG | SYSTOLIC BLOOD PRESSURE: 138 MMHG | OXYGEN SATURATION: 98 % | HEIGHT: 66 IN | HEART RATE: 66 BPM | WEIGHT: 164 LBS | BODY MASS INDEX: 26.36 KG/M2

## 2025-01-16 DIAGNOSIS — K59.04 CHRONIC IDIOPATHIC CONSTIPATION: Primary | ICD-10-CM

## 2025-01-16 PROCEDURE — 3075F SYST BP GE 130 - 139MM HG: CPT | Mod: S$GLB,,,

## 2025-01-16 PROCEDURE — 3079F DIAST BP 80-89 MM HG: CPT | Mod: S$GLB,,,

## 2025-01-16 PROCEDURE — 3008F BODY MASS INDEX DOCD: CPT | Mod: S$GLB,,,

## 2025-01-16 PROCEDURE — 1159F MED LIST DOCD IN RCRD: CPT | Mod: S$GLB,,,

## 2025-01-16 PROCEDURE — 1160F RVW MEDS BY RX/DR IN RCRD: CPT | Mod: S$GLB,,,

## 2025-01-16 PROCEDURE — 99999 PR PBB SHADOW E&M-EST. PATIENT-LVL III: CPT | Mod: PBBFAC,,,

## 2025-01-16 PROCEDURE — 99214 OFFICE O/P EST MOD 30 MIN: CPT | Mod: S$GLB,,,

## 2025-01-20 NOTE — PROGRESS NOTES
Gastroenterology Clinic Note    Patient ID: [id number]   Referring MD: Di Lewis FNP   Chief Complaint:   Chief Complaint   Patient presents with    Follow-up    Gastroesophageal Reflux     Constipation       History of Present Illness   Josefina Angelo is an 49 y.o. WF who is referred for abdominal pain.  Patient reports severe right upper quadrant abdominal pain.  Pain is postprandial.  She also experiences nausea and vomiting when she tries to eat.  She was seen at [facility] emergency department 9/12/24 with chest pain, constipation, and hypertension.  CT of the abdomen at that time did not reveal any acute findings.  She did have mild fecal stasis/constipation changes.  She has a history of gastric ulcers.  Her last EGD was many years ago.  She is not on PPI therapy.  She also reports issues with constipation.  Surgical history includes small-bowel obstruction requiring surgical intervention following a hysterectomy.  She has since experienced both bladder and rectal prolapse.  She require splinting to have a bowel movement.  She currently takes Linzess 145 mcg every morning but may still go days between bowel movements with this.  She has gone through pelvic floor PT in the past without any improvement.  She is scheduled for office visit with Uro-Gyn at [facility] 12/09/24.      Previous workup:  HIDA scan; EGD; ultrasound abdomen; CT abdomen pelvis with IV contrast    Last colonoscopy was 06/19/2023 with moderate amount of semi-liquid stool in the entire colon precluding visualization.  There was no endoscopic evidence of bleeding, diverticula, inflammation or mass in the colon.  Internal hemorrhoids were found.    Interval  - patient presents for follow-up  - she is now s/p cholecystectomy for hyperkinetic gallbladder  - she reports improvement in her abdominal pain as well as her nausea and vomiting  - she continues to have issues with constipation; she states the Trulance did not help at all; she is enquiring  about changing back to Linzess    Review of Systems   Constitutional:  Negative for weight loss.   Gastrointestinal:  Positive for constipation. Negative for abdominal pain, blood in stool, diarrhea, heartburn, melena, nausea and vomiting.       Past Medical History      Past Medical History:   Diagnosis Date    H/O atrioventricular jocy ablation     Hypertension     Paroxysmal A-fib     Small bowel obstruction 2022       Past Surgical History     Past Surgical History:   Procedure Laterality Date    AUGMENTATION OF BREAST Bilateral     2x    CARDIAC ELECTROPHYSIOLOGY MAPPING AND ABLATION       SECTION      COLONOSCOPY N/A 2023    Dr. Perry Mclean - [facility]    DIAGNOSTIC LAPAROSCOPY N/A 2022    Procedure: LAPAROSCOPY, DIAGNOSTIC;  Surgeon: Alexis Calabrese MD;  Location: [facility] OR;  Service: General;  Laterality: N/A;  LYSIS OF ADHESIONS    LAPAROSCOPIC CHOLECYSTECTOMY N/A 2024    Procedure: CHOLECYSTECTOMY, LAPAROSCOPIC;  Surgeon: Vicente Rivera MD;  Location: [facility] OR;  Service: General;  Laterality: N/A;    LEFT ARM SURGERY      LEFT LEG SURGERY      JOSE AND SCREWS    ROBOT-ASSISTED LAPAROSCOPIC HYSTERECTOMY N/A 2021    Procedure: ROBOTIC HYSTERECTOMY,POSS BSO,CYSTO ;  Surgeon: Young Jackson MD;  Location: [facility];  Service: OB/GYN;  Laterality: N/A;    ROBOT-ASSISTED LAPAROSCOPIC SALPINGO-OOPHORECTOMY Left 2021    Procedure: ROBOTIC SALPINGO-OOPHORECTOMY;  Surgeon: Young Jackson MD;  Location: [facility];  Service: OB/GYN;  Laterality: Left;    TUBAL LIGATION      tummy tuck      VAGINAL DELIVERY      X 2       Allergies   Review of patient's allergies indicates:  No Known Allergies    Immunization History     Immunization History   Administered Date(s) Administered    COVID-19, MRNA, LN-S, PF (MODERNA FULL 0.5 ML DOSE) 2021, 2021    Tdap 2024    Zoster Recombinant 2024       Past Family History      Family History    Problem Relation Name Age of Onset    Colon cancer Mother      Lung cancer Mother      Lung disease Mother      Hypertension Father      Heart disease Father      Heart disease Paternal Grandmother         Past Social History      Social History     Socioeconomic History    Marital status: Other   Tobacco Use    Smoking status: Never     Passive exposure: Never    Smokeless tobacco: Never   Substance and Sexual Activity    Alcohol use: Not Currently    Drug use: Never    Sexual activity: Yes     Partners: Male     Birth control/protection: None     Social Drivers of Health     Financial Resource Strain: Low Risk  (12/4/2024)    Overall Financial Resource Strain (CARDIA)     Difficulty of Paying Living Expenses: Not hard at all   Food Insecurity: No Food Insecurity (12/4/2024)    Hunger Vital Sign     Worried About Running Out of Food in the Last Year: Never true     Ran Out of Food in the Last Year: Never true   Transportation Needs: No Transportation Needs (5/12/2024)    PRAPARE - Transportation     Lack of Transportation (Medical): No     Lack of Transportation (Non-Medical): No   Physical Activity: Insufficiently Active (12/4/2024)    Exercise Vital Sign     Days of Exercise per Week: 2 days     Minutes of Exercise per Session: 30 min   Stress: Stress Concern Present (12/4/2024)    [employer] - Occupational Stress Questionnaire     Feeling of Stress : To some extent   Housing Stability: Unknown (12/4/2024)    Housing Stability Vital Sign     Unable to Pay for Housing in the Last Year: No       Current Medications     Outpatient Medications Marked as Taking for the 1/16/25 encounter (Office Visit) with Di Lewis FNP   Medication Sig Dispense Refill    lisinopriL (PRINIVIL,ZESTRIL) 20 MG tablet Take 1 tablet (20 mg total) by mouth 2 (two) times daily. 90 tablet 3    omeprazole (PRILOSEC) 40 MG capsule Take 1 capsule (40 mg total) by mouth once daily. 30 capsule 11        I have  "reviewed the current medications, allergies, vital signs, past medical and surgical history, family medical history, and social history for this encounter and agree with all findings.    OBJECTIVE    Physical Exam    /89   Pulse 66   Ht 5' 6" (1.676 m)   Wt 74.4 kg (164 lb)   LMP 11/22/2021   SpO2 98%   BMI 26.47 kg/m²   GEN: Well appearing, cooperative, NAD  NECK: Supple, no LAD  CV: Normal rate  RESP: Unlabored  ABD: ND, no guarding  EXT: No clubbing, cyanosis, or edema  SKIN: Warm and dry  NEURO: AAO x4.     LABS    CBC (with or without Differential):   Lab Results   Component Value Date    WBC 7.13 11/25/2024    HGB 14.3 11/25/2024    HCT 42.9 11/25/2024    MCV 90.9 11/25/2024    MCH 30.3 11/25/2024    MCHC 33.3 11/25/2024    RDW 12.6 11/25/2024     11/25/2024    MPV 11.2 11/25/2024    NEUTOPHILPCT 62.0 11/25/2024    DIFFTYPE Auto 11/25/2024     BMP/CMP:   Lab Results   Component Value Date     11/25/2024    K 4.3 11/25/2024     (H) 11/25/2024    CO2 27 11/25/2024    BUN 10 11/25/2024    CREATININE 0.74 11/25/2024    GLU 81 11/25/2024    CALCIUM 9.2 11/25/2024    ALBUMIN 3.9 11/25/2024    AST 26 11/25/2024    ALT 15 11/25/2024    ALKPHOS 53 11/25/2024    MG 2.1 01/11/2022        IMAGING  HIDA scan 11/2024  - The cystic and common jaqui ducts are patent.  - Normal  contraction of the gallbladder with  GBEF of 89% (normal less than normal 36%) after boost oral stimulation    CT abdomen pelvis with IV contrast 09/2024  - No acute abnormality within the abdomen or pelvis to explain   patient's symptoms.   - Mild fecal stasis/constipation changes are suggested.     ASSESSMENT  Josefina Angelo is a 49 y.o. WF with history of hypertension, AFib, pelvic floor dysfunction, and chronic constipation who is referred for abdominal pain.    1. Chronic idiopathic constipation           PLAN    - bowel cleanse followed by Linzess 145 mcg daily for chronic constipation  Patient Instructions   I " recommend a bowel cleanse with a gatorade miralax prep: take 20 mg of dulcolax orally and then mix 238 grams of miralax in 64 oz of your favorite zero sugar gatorade and drink over a 4 hour period on a day when you will be at home  After your bowel cleanse, I recommend starting a daily fiber supplement with Citrucel or Fibercon (can purchase at your local pharmacy)  I recommend that you also use Miralax 17 grams daily-to-twice daily as needed to have a regular, soft BM without straining - you can adjust how often you need miralax, but start with daily dosing and go from there  I recommend drinking at least 60-80 ounces of water daily unless you have a medical condition that requires fluid restriction        No orders of the defined types were placed in this encounter.        The risks and benefits of my recommendations, as well as other treatment options were discussed with the patient today. All questions were answered.    30 minutes of total time spent on the encounter, which includes face to face time and non-face to face time preparing to see the patient (eg, review of tests), obtaining and/or reviewing separately obtained history, documenting clinical information in the electronic or other health record, Independently interpreting results (not separately reported) and communicating results to the patient/family/caregiver, or care coordination (not separately reported).        KAYLEY RodriguezP/ACNP  [facility]

## 2025-05-29 ENCOUNTER — OFFICE VISIT (OUTPATIENT)
Dept: FAMILY MEDICINE | Facility: CLINIC | Age: 50
End: 2025-05-29
Payer: COMMERCIAL

## 2025-05-29 VITALS
WEIGHT: 149 LBS | OXYGEN SATURATION: 100 % | TEMPERATURE: 98 F | SYSTOLIC BLOOD PRESSURE: 127 MMHG | HEIGHT: 66 IN | RESPIRATION RATE: 20 BRPM | DIASTOLIC BLOOD PRESSURE: 84 MMHG | BODY MASS INDEX: 23.95 KG/M2 | HEART RATE: 74 BPM

## 2025-05-29 DIAGNOSIS — K59.00 CONSTIPATION, UNSPECIFIED CONSTIPATION TYPE: ICD-10-CM

## 2025-05-29 DIAGNOSIS — I10 PRIMARY HYPERTENSION: Primary | ICD-10-CM

## 2025-05-29 PROCEDURE — 99213 OFFICE O/P EST LOW 20 MIN: CPT | Mod: ,,, | Performed by: NURSE PRACTITIONER

## 2025-05-29 PROCEDURE — 1159F MED LIST DOCD IN RCRD: CPT | Mod: ,,, | Performed by: NURSE PRACTITIONER

## 2025-05-29 PROCEDURE — 3074F SYST BP LT 130 MM HG: CPT | Mod: ,,, | Performed by: NURSE PRACTITIONER

## 2025-05-29 PROCEDURE — 3008F BODY MASS INDEX DOCD: CPT | Mod: ,,, | Performed by: NURSE PRACTITIONER

## 2025-05-29 PROCEDURE — 3079F DIAST BP 80-89 MM HG: CPT | Mod: ,,, | Performed by: NURSE PRACTITIONER

## 2025-05-29 RX ORDER — ONDANSETRON 8 MG/1
8 TABLET, ORALLY DISINTEGRATING ORAL EVERY 12 HOURS PRN
COMMUNITY
Start: 2025-05-21

## 2025-05-29 RX ORDER — LISINOPRIL 20 MG/1
20 TABLET ORAL 2 TIMES DAILY
Qty: 90 TABLET | Refills: 3 | Status: SHIPPED | OUTPATIENT
Start: 2025-05-29 | End: 2025-05-29

## 2025-05-29 RX ORDER — BISACODYL 5 MG
10 TABLET, DELAYED RELEASE (ENTERIC COATED) ORAL ONCE
Qty: 2 TABLET | Refills: 0 | Status: SHIPPED | OUTPATIENT
Start: 2025-05-29 | End: 2025-05-29

## 2025-05-29 RX ORDER — ESTRADIOL 10 UG/1
1 TABLET, FILM COATED VAGINAL
COMMUNITY

## 2025-05-29 RX ORDER — OXYCODONE AND ACETAMINOPHEN 5; 325 MG/1; MG/1
1 TABLET ORAL EVERY 6 HOURS PRN
COMMUNITY
Start: 2025-05-21

## 2025-05-29 RX ORDER — LISINOPRIL 20 MG/1
20 TABLET ORAL 2 TIMES DAILY
Qty: 180 TABLET | Refills: 1 | Status: SHIPPED | OUTPATIENT
Start: 2025-05-29 | End: 2025-11-25

## 2025-05-29 RX ORDER — POLYETHYLENE GLYCOL 3350 17 G/17G
17 POWDER, FOR SOLUTION ORAL DAILY
Qty: 289 G | Refills: 0 | Status: SHIPPED | OUTPATIENT
Start: 2025-05-29

## 2025-05-29 NOTE — PROGRESS NOTES
Subjective:       Patient ID: Josefina Angelo is a 49 y.o. female.    Chief Complaint: Medication Refill (Patient reports to the clinic today for refill of Lisinopril.), Constipation (Patient states she is 7 days post surgery and has not been able to have a bowel movement, states she tried an enema yesterday but has not had any relief. She has tried stool softeners and her Linzess.), and Health Maintenance (Care gaps addressed, patient would like to have her 2nd Shingles vaccine at a later date.//[facility]ady[facility])    Medication refills and Constipation    Medication Refill  Pertinent negatives include no abdominal pain, change in bowel habit, chest pain, chills, congestion, coughing, fatigue, fever, headaches, nausea, neck pain, rash, sore throat, vomiting or weakness.   Constipation  Pertinent negatives include no abdominal pain, back pain, fever, nausea or vomiting.     Review of Systems   Constitutional:  Negative for appetite change, chills, fatigue and fever.   HENT:  Negative for nasal congestion, ear pain and sore throat.    Eyes:  Negative for pain, discharge and itching.   Respiratory:  Negative for cough and shortness of breath.    Cardiovascular:  Negative for chest pain and leg swelling.   Gastrointestinal:  Positive for constipation. Negative for abdominal pain, change in bowel habit, nausea and vomiting.   Genitourinary:  Negative for dysuria, flank pain, frequency, genital sores and urgency.   Musculoskeletal:  Negative for back pain, gait problem and neck pain.   Integumentary:  Negative for rash and wound.   Allergic/Immunologic: Negative for immunocompromised state.   Neurological:  Negative for dizziness, weakness and headaches.   All other systems reviewed and are negative.        Objective:      Physical Exam  Vitals and nursing note reviewed.   Constitutional:       General: She is not in acute distress.     Appearance: Normal appearance. She is not ill-appearing, toxic-appearing or  diaphoretic.   HENT:      Head: Normocephalic.      Right Ear: External ear normal.      Left Ear: External ear normal.      Nose: Nose normal.      Mouth/Throat:      Mouth: Mucous membranes are moist.   Eyes:      General: No scleral icterus.     Extraocular Movements: Extraocular movements intact.      Pupils: Pupils are equal, round, and reactive to light.   Neck:      Vascular: No carotid bruit.   Cardiovascular:      Rate and Rhythm: Normal rate and regular rhythm.      Pulses: Normal pulses.      Heart sounds: Normal heart sounds. No murmur heard.  Pulmonary:      Effort: Pulmonary effort is normal. No respiratory distress.      Breath sounds: Normal breath sounds. No wheezing, rhonchi or rales.   Abdominal:      General: Bowel sounds are decreased.      Palpations: Abdomen is soft.      Tenderness: There is generalized abdominal tenderness. There is no right CVA tenderness, left CVA tenderness, guarding or rebound.   Musculoskeletal:         General: Normal range of motion.      Cervical back: Normal range of motion and neck supple. No tenderness.      Right lower leg: No edema.      Left lower leg: No edema.   Lymphadenopathy:      Cervical: No cervical adenopathy.   Skin:     General: Skin is warm and dry.      Capillary Refill: Capillary refill takes less than 2 seconds.      Findings: No rash.   Neurological:      Mental Status: She is alert and oriented to person, place, and time.   Psychiatric:         Mood and Affect: Mood normal.         Behavior: Behavior normal.         Thought Content: Thought content normal.         Judgment: Judgment normal.          Assessment:       1. Primary hypertension    2. Constipation, unspecified constipation type        Plan:   Primary hypertension  -     Discontinue: lisinopriL (PRINIVIL,ZESTRIL) 20 MG tablet; Take 1 tablet (20 mg total) by mouth 2 (two) times daily.  Dispense: 90 tablet; Refill: 3  -     lisinopriL (PRINIVIL,ZESTRIL) 20 MG tablet; Take 1 tablet (20  mg total) by mouth 2 (two) times daily.  Dispense: 180 tablet; Refill: 1    Constipation, unspecified constipation type  -     polyethylene glycol (GLYCOLAX) 17 gram/dose powder; Take 17 g by mouth once daily.  Dispense: 289 g; Refill: 0  -     bisacodyL (DULCOLAX) 5 mg EC tablet; Take 2 tablets (10 mg total) by mouth once. for 1 dose  Dispense: 2 tablet; Refill: 0  -     X-Ray KUB; Future; Expected date: 05/29/2025       Soft mechanical diet for the next couple of weeks    Risks, benefits, and side effects were discussed with the patient. All questions were answered to the fullest satisfaction of the patient, and pt verbalized understanding and agreement to treatment plan. Pt was to call with any new or worsening symptoms, or present to the ER

## (undated) DEVICE — SUTURE VICRYL 1 VIO CTX 36IN

## (undated) DEVICE — SUTURE PDS II 0 VIOLET 27 CT2

## (undated) DEVICE — CANNULA LAP SEAL Z THRD 5X100

## (undated) DEVICE — SOL IRRIGATION SALINE 0.9% 1000ML BOTTLE

## (undated) DEVICE — STRIP MEDI WND CLSR 1/2X4IN

## (undated) DEVICE — GLOVE SURGICAL PROTEXIS PI SIZE 7.5

## (undated) DEVICE — GOWN POLY REINF BRTH SLV XL

## (undated) DEVICE — GLOVE SURGICAL PROTEXIS PI SIZE 6.5

## (undated) DEVICE — SYR 10CC LUER LOCK

## (undated) DEVICE — DAVINCI VESSEL SEALER ENDOWRIST

## (undated) DEVICE — WARMER BLUE HEAT SCOPE 3-12MM

## (undated) DEVICE — TROCAR ENDO Z THREAD KII 5X100

## (undated) DEVICE — NEEDLE FILTER 19GAX1

## (undated) DEVICE — SUTURE MONOCRYL 4-0 27IN

## (undated) DEVICE — DRAPE SURGICAL 3/4 SHEET 56 X 77" STERILE"

## (undated) DEVICE — GLOVE SURGICAL PROTEXIS PI CLASSIC SIZE 7.0

## (undated) DEVICE — CDS LAP CHOLE

## (undated) DEVICE — ACCESSORY TIP COVER

## (undated) DEVICE — CDS ROBOTIC

## (undated) DEVICE — GLOVE SURGICAL PROTEXIS PI BLUE SIZE 6.0

## (undated) DEVICE — TROCAR BLADELESS Z-THREAD 12MM X 150MM

## (undated) DEVICE — STRIP CLOSURE SKIN 1/2 X 4 IN

## (undated) DEVICE — SUTURE VICRYL 4-0 FS2 UNDYED 27 IN

## (undated) DEVICE — WARMER SCOPE DISP

## (undated) DEVICE — SYRINGE 60CC CATHETER TIP

## (undated) DEVICE — GLOVE SENSICARE PI GRN 7

## (undated) DEVICE — IRRIGATOR SUCTION STYKERFLOW II DISP

## (undated) DEVICE — SCISSOR MONOPOLAR CURVED DAVINCI

## (undated) DEVICE — Device

## (undated) DEVICE — GOWN SURGICAL SMARTGOWN LEVEL 4 / EXTRA LARGE STERILE

## (undated) DEVICE — TROCAR BLADELESS Z-THREAD 5MM X 100MM

## (undated) DEVICE — SOL IRRIGATION SALINE 3000ML BAG

## (undated) DEVICE — ADHESIVE MASTISOL VIAL 48/BX

## (undated) DEVICE — SUTURE VICRYL 0 UR-6 27 IN VIOLET

## (undated) DEVICE — TROCAR GELPORT BLUNT BALLOON SYSTEM 10/12X100MM LF

## (undated) DEVICE — DRESSING TELFA NONADH 3X4 STL AMD

## (undated) DEVICE — OCCLUDER RUMI COLPO PNEUMO

## (undated) DEVICE — GLOVE SURGICAL PROTEXIS PI SIZE 7

## (undated) DEVICE — ELECTRODE LAPSCP L HOOK 36CM

## (undated) DEVICE — V-LOC WOUND CLOSURE DEVICE 2-0 V-20

## (undated) DEVICE — GLOVE SURGICAL PROTEXIS PI BLUE SIZE 6.5

## (undated) DEVICE — TAPE STRIPS F/DRAPE 4.5X27IN

## (undated) DEVICE — SUTURE MONOCRYL 4-0 18 PS-2

## (undated) DEVICE — APPLICATOR CHLORAPREP HI-LITE TINTED ORANGE 26ML

## (undated) DEVICE — GOWN SURGICAL STERILE LEVEL 3 / XX-LARGE

## (undated) DEVICE — BAG DRAIN UROLOGY ANTI REFLUX 2000ML LF

## (undated) DEVICE — GLOVE SURGICAL PROTEXIS PI BLUE SIZE 7.0

## (undated) DEVICE — GLOVE SENSICARE PI GRN 6

## (undated) DEVICE — APPLICATOR LAPAROSCOPIC 35CM

## (undated) DEVICE — APPLIER CLIP ENDO MED/LG 10MM

## (undated) DEVICE — GLOVE SENSICARE PI GRN 6.5

## (undated) DEVICE — TROCAR KII FIOS ZTHREAD 11X100

## (undated) DEVICE — TUBING INSUFFLATION HI-FLOW HEATED

## (undated) DEVICE — INTUITIVE 3-ARM DISPOSABLE KIT

## (undated) DEVICE — SUT MONOCYRL 4-0 PS2 UND

## (undated) DEVICE — CATHETER FOLEY 16FR SILVER 5CC

## (undated) DEVICE — GLOVE SENSICARE PI SURG 7

## (undated) DEVICE — DISSECTOR KITTNER ENDO BLUNT DISP

## (undated) DEVICE — SUTURE VICRYL 0 CT2

## (undated) DEVICE — NEEDLEDRIVER SUTURE CUT DAVINCI

## (undated) DEVICE — MANIPULATOR UTERINE RUMI 6.7 X 10MM

## (undated) DEVICE — HEMOBLAST BELLOWS AND CANNULAS

## (undated) DEVICE — CORD LAP 10 DISP

## (undated) DEVICE — OBTURATOR BLADELESS 8MM REG

## (undated) DEVICE — SCISSOR 5MMX35CM DIRECT DRIVE

## (undated) DEVICE — TROCAR SLEEVE Z-THREAD 5MM X 100MM

## (undated) DEVICE — GLOVE SURGICAL PROTEXIS PI SIZE 8.0

## (undated) DEVICE — GLOVE SURGICAL PROTEXIS PI SIZE 6

## (undated) DEVICE — MONOPOLAR FOOT SWITCHING LAP CHORD

## (undated) DEVICE — FORCEPS FENESTRATED BIPOLAR DAVINCI

## (undated) DEVICE — BAG TISS RETRV MONARCH 10MM

## (undated) DEVICE — SLEEVE SURGICAL STERILE